# Patient Record
Sex: FEMALE | Race: WHITE | NOT HISPANIC OR LATINO | URBAN - METROPOLITAN AREA
[De-identification: names, ages, dates, MRNs, and addresses within clinical notes are randomized per-mention and may not be internally consistent; named-entity substitution may affect disease eponyms.]

---

## 2017-11-07 ENCOUNTER — EMERGENCY (EMERGENCY)
Facility: HOSPITAL | Age: 42
LOS: 0 days | Discharge: HOME | End: 2017-11-07
Admitting: INTERNAL MEDICINE

## 2017-11-07 DIAGNOSIS — Z98.51 TUBAL LIGATION STATUS: ICD-10-CM

## 2017-11-07 DIAGNOSIS — R51 HEADACHE: ICD-10-CM

## 2017-11-07 DIAGNOSIS — Z90.89 ACQUIRED ABSENCE OF OTHER ORGANS: ICD-10-CM

## 2018-01-26 ENCOUNTER — EMERGENCY (EMERGENCY)
Facility: HOSPITAL | Age: 43
LOS: 0 days | Discharge: HOME | End: 2018-01-26
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
## 2018-01-26 DIAGNOSIS — Z79.899 OTHER LONG TERM (CURRENT) DRUG THERAPY: ICD-10-CM

## 2018-01-26 DIAGNOSIS — Z98.890 OTHER SPECIFIED POSTPROCEDURAL STATES: ICD-10-CM
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
## 2018-01-26 DIAGNOSIS — L05.01 PILONIDAL CYST WITH ABSCESS: ICD-10-CM
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
## 2018-01-26 DIAGNOSIS — E03.9 HYPOTHYROIDISM, UNSPECIFIED: ICD-10-CM

## 2018-01-26 DIAGNOSIS — T80.89XA OTHER COMPLICATIONS FOLLOWING INFUSION, TRANSFUSION AND THERAPEUTIC INJECTION, INITIAL ENCOUNTER: ICD-10-CM

## 2018-01-26 DIAGNOSIS — Z98.51 TUBAL LIGATION STATUS: ICD-10-CM

## 2019-03-26 ENCOUNTER — APPOINTMENT (OUTPATIENT)
Dept: SURGERY | Facility: CLINIC | Age: 44
End: 2019-03-26
Payer: SELF-PAY

## 2019-03-26 VITALS
SYSTOLIC BLOOD PRESSURE: 128 MMHG | BODY MASS INDEX: 47.09 KG/M2 | HEIGHT: 66 IN | DIASTOLIC BLOOD PRESSURE: 82 MMHG | WEIGHT: 293 LBS

## 2019-03-26 PROCEDURE — SI006: CPT

## 2019-04-10 ENCOUNTER — APPOINTMENT (OUTPATIENT)
Dept: SURGERY | Facility: CLINIC | Age: 44
End: 2019-04-10
Payer: COMMERCIAL

## 2019-04-10 VITALS
SYSTOLIC BLOOD PRESSURE: 128 MMHG | BODY MASS INDEX: 47.09 KG/M2 | DIASTOLIC BLOOD PRESSURE: 76 MMHG | WEIGHT: 293 LBS | HEIGHT: 66 IN

## 2019-04-10 DIAGNOSIS — Z86.39 PERSONAL HISTORY OF OTHER ENDOCRINE, NUTRITIONAL AND METABOLIC DISEASE: ICD-10-CM

## 2019-04-10 DIAGNOSIS — Z87.09 PERSONAL HISTORY OF OTHER DISEASES OF THE RESPIRATORY SYSTEM: ICD-10-CM

## 2019-04-10 DIAGNOSIS — Z83.0 FAMILY HISTORY OF HUMAN IMMUNODEFICIENCY VIRUS [HIV] DISEASE: ICD-10-CM

## 2019-04-10 PROCEDURE — 99244 OFF/OP CNSLTJ NEW/EST MOD 40: CPT

## 2019-04-17 NOTE — REASON FOR VISIT
[Initial Consult] : an initial consult for [Morbid Obesity (BMI>40)] : morbid obesity (bmi>40) [Spouse] : spouse [FreeTextEntry2] : Patient presents for Bariatric consultation

## 2019-04-17 NOTE — REVIEW OF SYSTEMS
[Joint Stiffness] : joint stiffness [Joint Pain] : joint pain [Negative] : Allergic/Immunologic [FreeTextEntry9] : back

## 2019-04-17 NOTE — PLAN
[FreeTextEntry1] : PLAN:  Continue with preoperative evaluations.\par             Call with concerns.

## 2019-04-17 NOTE — HISTORY OF PRESENT ILLNESS
[de-identified] : Patient  presented today for Bariatric Surgery Consultation for consideration of Laparoscopic Gastric Bypass. The patient states that she has been overweight for several years and has tried multiple weight loss modalities in the past without any long-term sustainable weight loss. \par \par

## 2019-04-17 NOTE — CONSULT LETTER
[Please see my note below.] : Please see my note below. [Courtesy Letter:] : I had the pleasure of seeing your patient, [unfilled], in my office today. [Consult Closing:] : Thank you very much for allowing me to participate in the care of this patient.  If you have any questions, please do not hesitate to contact me. [Sincerely,] : Sincerely, [Dear  ___] : Dear  [unfilled], [FreeTextEntry3] : Kim Portillo MD, FACS, FASMBS\par Chief, General, Bariatric & Minimally Invasive Surgery\par [facility] \par [facility]\par

## 2019-04-17 NOTE — ASSESSMENT
[FreeTextEntry1] : MAHI KEBEDE is a 44 year female seen today for Bariatric consultation. She has access to a gym but is not engaging in any formal exercise.  She plans to use the treadmill at the gym 2 days week for 15 minutes and aim for a goal of 3-4 days week for 30 minutes.\par The surgical options for weight loss have been extensively discussed with the patient and questions answered. The patient was provided written and verbal education regarding the gastric bypass. The patient appears to have a reasonable understanding of the process and is ready to proceed.  Risks, including but not limited to:  anesthesia, death, bleeding, infection, leaks, blood clots, ulcers, malnutrition and need for additional operations have been reviewed.  The importance of a consistent diet and exercise regimen in regards to weight loss and maintenance has also been discussed and the patient agrees to actively participate in the process.  The importance of lifelong mineral and vitamin supplementation was also reviewed with the patient. The need to adhere to an appropriate diet and exercise regimen were emphasized; in particular the need to perform moderate to intense physical activity most days of the week.  No promises have been made in regards to any given outcome and possibility of inadequate weight loss as well as regain has been discussed with the patient.  The patient understands that a long-term commitment is necessary for long-term success.\par  \par

## 2019-04-17 NOTE — PHYSICAL EXAM
[Normal] : grossly intact [de-identified] : Mallampati III [de-identified] : Soft, nondistended

## 2019-04-24 ENCOUNTER — TRANSCRIPTION ENCOUNTER (OUTPATIENT)
Age: 44
End: 2019-04-24

## 2019-04-24 ENCOUNTER — FORM ENCOUNTER (OUTPATIENT)
Age: 44
End: 2019-04-24

## 2019-04-24 ENCOUNTER — OUTPATIENT (OUTPATIENT)
Dept: OUTPATIENT SERVICES | Facility: HOSPITAL | Age: 44
LOS: 1 days | Discharge: HOME | End: 2019-04-24
Payer: COMMERCIAL

## 2019-04-24 DIAGNOSIS — E06.9 THYROIDITIS, UNSPECIFIED: ICD-10-CM

## 2019-04-24 PROCEDURE — 76536 US EXAM OF HEAD AND NECK: CPT | Mod: 26

## 2019-04-25 ENCOUNTER — APPOINTMENT (OUTPATIENT)
Dept: ORTHOPEDIC SURGERY | Facility: CLINIC | Age: 44
End: 2019-04-25
Payer: COMMERCIAL

## 2019-04-25 ENCOUNTER — APPOINTMENT (OUTPATIENT)
Dept: THORACIC SURGERY | Facility: CLINIC | Age: 44
End: 2019-04-25
Payer: COMMERCIAL

## 2019-04-25 ENCOUNTER — OUTPATIENT (OUTPATIENT)
Dept: OUTPATIENT SERVICES | Facility: HOSPITAL | Age: 44
LOS: 1 days | End: 2019-04-25
Payer: COMMERCIAL

## 2019-04-25 ENCOUNTER — APPOINTMENT (OUTPATIENT)
Dept: CT IMAGING | Facility: HOSPITAL | Age: 44
End: 2019-04-25
Payer: COMMERCIAL

## 2019-04-25 VITALS
BODY MASS INDEX: 47.09 KG/M2 | OXYGEN SATURATION: 99 % | HEIGHT: 66 IN | DIASTOLIC BLOOD PRESSURE: 80 MMHG | HEART RATE: 76 BPM | WEIGHT: 293 LBS | SYSTOLIC BLOOD PRESSURE: 120 MMHG

## 2019-04-25 VITALS
OXYGEN SATURATION: 96 % | DIASTOLIC BLOOD PRESSURE: 98 MMHG | SYSTOLIC BLOOD PRESSURE: 191 MMHG | RESPIRATION RATE: 18 BRPM | BODY MASS INDEX: 44.3 KG/M2 | HEART RATE: 108 BPM | HEIGHT: 69 IN

## 2019-04-25 VITALS
OXYGEN SATURATION: 97 % | SYSTOLIC BLOOD PRESSURE: 191 MMHG | BODY MASS INDEX: 47.09 KG/M2 | HEIGHT: 66 IN | TEMPERATURE: 99.2 F | RESPIRATION RATE: 16 BRPM | WEIGHT: 293 LBS | DIASTOLIC BLOOD PRESSURE: 98 MMHG | HEART RATE: 108 BPM

## 2019-04-25 DIAGNOSIS — Z82.61 FAMILY HISTORY OF ARTHRITIS: ICD-10-CM

## 2019-04-25 DIAGNOSIS — Z87.39 PERSONAL HISTORY OF OTHER DISEASES OF THE MUSCULOSKELETAL SYSTEM AND CONNECTIVE TISSUE: ICD-10-CM

## 2019-04-25 DIAGNOSIS — Z87.898 PERSONAL HISTORY OF OTHER SPECIFIED CONDITIONS: ICD-10-CM

## 2019-04-25 PROCEDURE — 99204 OFFICE O/P NEW MOD 45 MIN: CPT

## 2019-04-25 PROCEDURE — 70490 CT SOFT TISSUE NECK W/O DYE: CPT | Mod: 26

## 2019-04-25 PROCEDURE — 71250 CT THORAX DX C-: CPT | Mod: 26

## 2019-04-25 PROCEDURE — 71250 CT THORAX DX C-: CPT

## 2019-04-25 PROCEDURE — 70490 CT SOFT TISSUE NECK W/O DYE: CPT

## 2019-04-25 NOTE — PHYSICAL EXAM
[General Appearance - Alert] : alert [Respiration, Rhythm And Depth] : normal respiratory rhythm and effort [] : no respiratory distress [Auscultation Breath Sounds / Voice Sounds] : lungs were clear to auscultation bilaterally [Exaggerated Use Of Accessory Muscles For Inspiration] : no accessory muscle use [Apical Impulse] : the apical impulse was normal [Heart Sounds] : normal S1 and S2 [Heart Rate And Rhythm] : heart rate was normal and rhythm regular [Examination Of The Chest] : the chest was normal in appearance [2+] : left 2+ [Skin Color & Pigmentation] : normal skin color and pigmentation [Abnormal Walk] : normal gait [Oriented To Time, Place, And Person] : oriented to person, place, and time

## 2019-04-26 NOTE — ASSESSMENT
[FreeTextEntry1] : 45 y/o F with incidental finding of a lesion at the T1-2 level on recent MRI. Reviewed MRI images with the patient, discussed that the lesion is well circumscribed and localized to an area at the T1-2 level. However, at this time I'm unsure if the lesion is located in the upper chest or lower neck, but upon my review it seems to be in the lower neck. \par \par Will need a CT neck and chest without contrast to further evaluate and will follow-up with patient with the results. Explained that if it is in the chest, we will likely proceed with removal and biopsy, if it is in the neck will refer her to Dr. Musa in ENT surgery. \par \par I have reviewed the patient's medical records and diagnostic images at the time of this office visit and have made the following recommendations\par Plan:\par 1. Obtain CT chest and neck without contrast to further evaluate lesion\par 2. Follow-up patient with results and plan of care

## 2019-04-26 NOTE — HISTORY OF PRESENT ILLNESS
[FreeTextEntry1] : 44yr old female, nonsmoker with a PMH of HLD, anxiety, arthritis, and hypothyroidism. She was experiencing back pain and evaluated by a Pain Management Specialist that recommended an MRI spine to further evaluate. She was evaluated by Dr. Maher and referred for possible surgical biopsy. \par \par MRI thoracic spine 04/22/19:\par -T1-2 right paraspinal lesion possibly representing a lipoma although a liposarcoma cannot be entirely excluded.\par -enlarged thyroid gland probably representing a goiter.\par -no evidence of thoracic disc herniation\par -minimal scoliosis \par \par \par

## 2019-04-26 NOTE — END OF VISIT
[FreeTextEntry3] : All medical record entries made by the Scribe were at my, Dr. Combs's direction and personally dictated by me on 04/25/2019 . I have reviewed the chart and agree that the record accurately reflects my personal performance of the history, physical exam, assessment and plan. I have also personally directed, reviewed, and agreed with the chart.

## 2019-04-29 ENCOUNTER — APPOINTMENT (OUTPATIENT)
Dept: OTOLARYNGOLOGY | Facility: CLINIC | Age: 44
End: 2019-04-29
Payer: COMMERCIAL

## 2019-04-29 ENCOUNTER — APPOINTMENT (OUTPATIENT)
Dept: NEUROSURGERY | Facility: CLINIC | Age: 44
End: 2019-04-29
Payer: COMMERCIAL

## 2019-04-29 VITALS
BODY MASS INDEX: 45.99 KG/M2 | SYSTOLIC BLOOD PRESSURE: 145 MMHG | WEIGHT: 293 LBS | RESPIRATION RATE: 16 BRPM | HEART RATE: 74 BPM | DIASTOLIC BLOOD PRESSURE: 82 MMHG | HEIGHT: 67 IN | TEMPERATURE: 97.6 F | OXYGEN SATURATION: 97 %

## 2019-04-29 DIAGNOSIS — R91.8 OTHER NONSPECIFIC ABNORMAL FINDING OF LUNG FIELD: ICD-10-CM

## 2019-04-29 DIAGNOSIS — D17.9 BENIGN LIPOMATOUS NEOPLASM, UNSPECIFIED: ICD-10-CM

## 2019-04-29 PROCEDURE — 99203 OFFICE O/P NEW LOW 30 MIN: CPT

## 2019-04-29 PROCEDURE — 99204 OFFICE O/P NEW MOD 45 MIN: CPT

## 2019-04-29 NOTE — HISTORY OF PRESENT ILLNESS
[de-identified] : 44 year old female hx of HLD, anxiety, arthritis, and hypothyroid, noted to have thoracic mass near brachial plexus and spinal column during work-up for gastric bypass. 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 Lesion paraspinal, on Right, near T1-2. No focal deficits.

## 2019-04-29 NOTE — ASSESSMENT
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 The differential diagnosis includes lipoma, nerve sheath tumor, schwannoma, or liposarcoma. She will need a multi-disciplinary approach for treatment and diagnosis of this mass. Diagnostic and therapeutic testing / management includes surgical resection with further planning dependent on pathology. She should see Dr. Badillo and CT surgery for formal scheduling with my team to join on the selected surgery date. I have explained all neurosurgical risks and benefits to the planned approach and the patient wishes to proceed at this time.

## 2019-04-30 ENCOUNTER — APPOINTMENT (OUTPATIENT)
Dept: HEMATOLOGY ONCOLOGY | Facility: CLINIC | Age: 44
End: 2019-04-30

## 2019-05-01 NOTE — PHYSICAL EXAM
[Normal] : no rashes [FreeTextEntry1] : wn, wd, obese, pleasant [de-identified] : no palpable mass, no cervical LAD, normal, pliable cervical skin [de-identified] : on UE weakness, normal, symmetric sensation

## 2019-05-01 NOTE — HISTORY OF PRESENT ILLNESS
[de-identified] : 43 yo F referred by Dr Combs for evaluation of right cervico-thoracic lipomatous lesion She had been evaluation for back and neck pain and this was an incidental finding on an MRI for suspected disc disease. She is a nonsmoker with a PMH of HLD, anxiety, arthritis, and hypothyroidism.  She has no symptoms of thoracic outlet syndrome such as weakness/parasthesia of UE, no shoulder pain, no neck pain, only back pain\par \par She was evaluated by Dr. Maher and referred for possible surgical biopsy. \par \par MRI thoracic spine 04/22/19:\par -T1-2 right paraspinal lesion possibly representing a lipoma although a liposarcoma cannot be entirely excluded.\par -enlarged thyroid gland probably representing a goiter.\par -no evidence of thoracic disc herniation\par -minimal scoliosis \par

## 2019-05-01 NOTE — ASSESSMENT
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

## 2019-05-02 PROBLEM — Z87.39 HISTORY OF ARTHRITIS: Status: RESOLVED | Noted: 2019-04-25 | Resolved: 2019-05-02

## 2019-05-02 PROBLEM — Z87.898 HISTORY OF MARIJUANA USE: Status: ACTIVE | Noted: 2019-04-25

## 2019-05-02 PROBLEM — Z82.61 FAMILY HISTORY OF ARTHRITIS: Status: ACTIVE | Noted: 2019-04-25

## 2019-05-02 NOTE — PHYSICAL EXAM
[General Appearance - Well Nourished] : well nourished [General Appearance - Well-Appearing] : Well appearing [Sclera] : the sclera and conjunctiva were normal [FreeTextEntry1] : Anxious [Neck Cervical Mass (___cm)] : no neck mass was observed [Heart Rate And Rhythm] : heart rate was normal and rhythm regular [Abdomen Soft] : Soft [] : No respiratory distress [Normal Station and Gait] : gait and station were normal [Full UE ROM unless otherwise noted:] : Full range of motion unless otherwise noted. [UE Motor Strength Normal unless otherwise noted:] : 5/5 strength in bilateral upper extremities unless otherwise noted. [Plantar Reflex Right Only] : absent on the right [Plantar Reflex Left Only] : absent on the left [Normal] : No swelling, erythema, ecchymosis or tenderness, no masses. [Tenderness] : no tenderness [Full ROM Unless otherwise noted:] : Full range of motion unless otherwise noted: [LE  Motor Strength Normal unless otherwise noted:] : 5/5 strength in bilateral lower extemities unless otherwise noted. [Masses] : no masses [2+] : left 2+

## 2019-05-02 NOTE — HISTORY OF PRESENT ILLNESS
[FreeTextEntry1] : This is a 44-year-old female who is no cardiac history who comes in complaining of many years of neck pain. She had an MRI which was reported as a paraspinal fatty mass thought to be lipoma with a cannot rule out liposarcoma read as well.Her pain is on and off especially when turning to the RIGHT side and when bending to the RIGHT side. She feels stiff. She has minimal radiation to either arm. She also didn't was told that the thyroid was margin this was checked and found to be normal. [Stable] : stable [___ yrs] : [unfilled] year(s) ago [2] : currently ~his/her~ pain is 2 out of 10 [Bending] : worsened by bending [Direct Pressure] : not exacerbated by direct pressure [Joint Movement] : worsened by joint movement [None] : No relieving factors are noted

## 2019-05-02 NOTE — DISCUSSION/SUMMARY
[All Questions Answered] : Patient (and family) had all questions answered to an agreeable level of satisfaction [Interested in Proceeding] : Patient (and family) expressed understanding and interest in proceeding with the plan as outlined [de-identified] : This lesion is in the anterior tissues of just in front and lateral to the paraspinal anterior longus muscles. This is not in the area of orthopaedics. The recommendation is to see the thoracic surgeon. I have spoken to the thoracic surgeon today and he will see her today. As this is at the top alone feels it is possibly the bottom of the neck related ENT and also up at the top of the chest cavity. She will see Dr. Combs right now. I also tried to reassure her that the imaging is very consistent with fat tumor less likely cancer.\par \par If imaging was ordered, the patient was told to make an appointment to review findings right after all imaging is completed.\par \par We discussed risks, benefits and alternatives. Rationale of care was reviewed and all questions were answered. Patient (and family) had all questions answered to her degree of the level of satisfaction. Patient (and family) expressed understanding and interest in proceeding with the plan as outlined.\par \par \par \par \par This note was done with a voice recognition transcription software and any typos are related to this rather than medical error.

## 2019-05-02 NOTE — DATA REVIEWED
[Imaging Present] : Present [de-identified] : MRI done 4/22/2019 shows T1-T2 RIGHT paraspinal lesion that was approximately 10 cm representing a lipoma although liposarcoma cannot be entirely excluded. There is a large thyroid gland. While it was reported as paraspinal this lesion is actually at the anterior paraspinal muscles and not in the muscles rather in the fat in the area of the origin of the brachial plexus and vessels. This is at the top of the lung fields medially.

## 2019-05-02 NOTE — REVIEW OF SYSTEMS
[Feeling Tired] : not feeling tired [Joint Pain] : joint pain [Joint Stiffness] : joint stiffness [Anxiety] : no anxiety [Nl] : Hematologic/Lymphatic

## 2019-05-07 ENCOUNTER — RESULT REVIEW (OUTPATIENT)
Age: 44
End: 2019-05-07

## 2019-05-16 ENCOUNTER — OTHER (OUTPATIENT)
Age: 44
End: 2019-05-16

## 2019-05-16 ENCOUNTER — APPOINTMENT (OUTPATIENT)
Dept: SURGERY | Facility: CLINIC | Age: 44
End: 2019-05-16
Payer: COMMERCIAL

## 2019-05-16 VITALS — WEIGHT: 293 LBS | BODY MASS INDEX: 47.09 KG/M2 | HEIGHT: 66 IN

## 2019-05-16 PROCEDURE — 97802 MEDICAL NUTRITION INDIV IN: CPT

## 2019-06-13 ENCOUNTER — APPOINTMENT (OUTPATIENT)
Dept: SURGERY | Facility: CLINIC | Age: 44
End: 2019-06-13

## 2019-06-18 ENCOUNTER — OTHER (OUTPATIENT)
Age: 44
End: 2019-06-18

## 2019-06-19 ENCOUNTER — RECORD ABSTRACTING (OUTPATIENT)
Age: 44
End: 2019-06-19

## 2019-06-19 DIAGNOSIS — N39.3 STRESS INCONTINENCE (FEMALE) (MALE): ICD-10-CM

## 2019-06-19 DIAGNOSIS — Z78.9 OTHER SPECIFIED HEALTH STATUS: ICD-10-CM

## 2019-06-19 LAB
CYTOLOGY CVX/VAG DOC THIN PREP: NORMAL
CYTOLOGY CVX/VAG DOC THIN PREP: NORMAL

## 2019-06-20 ENCOUNTER — FORM ENCOUNTER (OUTPATIENT)
Age: 44
End: 2019-06-20

## 2019-06-20 ENCOUNTER — APPOINTMENT (OUTPATIENT)
Dept: OBGYN | Facility: CLINIC | Age: 44
End: 2019-06-20
Payer: COMMERCIAL

## 2019-06-20 VITALS
BODY MASS INDEX: 45.99 KG/M2 | HEIGHT: 67 IN | SYSTOLIC BLOOD PRESSURE: 132 MMHG | DIASTOLIC BLOOD PRESSURE: 90 MMHG | WEIGHT: 293 LBS

## 2019-06-20 PROCEDURE — 99213 OFFICE O/P EST LOW 20 MIN: CPT

## 2019-06-21 ENCOUNTER — OUTPATIENT (OUTPATIENT)
Dept: OUTPATIENT SERVICES | Facility: HOSPITAL | Age: 44
LOS: 1 days | Discharge: HOME | End: 2019-06-21
Payer: COMMERCIAL

## 2019-06-21 DIAGNOSIS — N64.4 MASTODYNIA: ICD-10-CM

## 2019-06-21 PROCEDURE — G0279: CPT | Mod: 26

## 2019-06-21 PROCEDURE — 77066 DX MAMMO INCL CAD BI: CPT | Mod: 26

## 2019-06-21 PROCEDURE — 76642 ULTRASOUND BREAST LIMITED: CPT | Mod: 26,50

## 2019-07-08 ENCOUNTER — APPOINTMENT (OUTPATIENT)
Dept: NEUROSURGERY | Facility: CLINIC | Age: 44
End: 2019-07-08

## 2019-07-08 ENCOUNTER — OTHER (OUTPATIENT)
Age: 44
End: 2019-07-08

## 2019-07-09 ENCOUNTER — APPOINTMENT (OUTPATIENT)
Dept: OBGYN | Facility: CLINIC | Age: 44
End: 2019-07-09
Payer: COMMERCIAL

## 2019-07-09 VITALS — DIASTOLIC BLOOD PRESSURE: 90 MMHG | WEIGHT: 293 LBS | SYSTOLIC BLOOD PRESSURE: 136 MMHG | BODY MASS INDEX: 49.65 KG/M2

## 2019-07-09 PROCEDURE — 99214 OFFICE O/P EST MOD 30 MIN: CPT

## 2019-07-14 ENCOUNTER — EMERGENCY (EMERGENCY)
Facility: HOSPITAL | Age: 44
LOS: 0 days | Discharge: HOME | End: 2019-07-14
Attending: EMERGENCY MEDICINE | Admitting: EMERGENCY MEDICINE
Payer: COMMERCIAL

## 2019-07-14 VITALS
TEMPERATURE: 98 F | OXYGEN SATURATION: 98 % | RESPIRATION RATE: 20 BRPM | SYSTOLIC BLOOD PRESSURE: 139 MMHG | DIASTOLIC BLOOD PRESSURE: 64 MMHG | HEART RATE: 111 BPM

## 2019-07-14 VITALS — HEART RATE: 98 BPM

## 2019-07-14 DIAGNOSIS — M25.552 PAIN IN LEFT HIP: ICD-10-CM

## 2019-07-14 DIAGNOSIS — Z98.51 TUBAL LIGATION STATUS: Chronic | ICD-10-CM

## 2019-07-14 DIAGNOSIS — F41.9 ANXIETY DISORDER, UNSPECIFIED: ICD-10-CM

## 2019-07-14 DIAGNOSIS — R10.9 UNSPECIFIED ABDOMINAL PAIN: ICD-10-CM

## 2019-07-14 PROCEDURE — 99283 EMERGENCY DEPT VISIT LOW MDM: CPT

## 2019-07-14 PROCEDURE — 72170 X-RAY EXAM OF PELVIS: CPT | Mod: 26

## 2019-07-14 NOTE — ED ADULT TRIAGE NOTE - CHIEF COMPLAINT QUOTE
I started having pain last night on my side , when I touch it, it hurts, but now its spreading (LLQ) - patient

## 2019-07-14 NOTE — ED ADULT NURSE NOTE - NSIMPLEMENTINTERV_GEN_ALL_ED
Implemented All Universal Safety Interventions:  [place] to call system. Call bell, personal items and telephone within reach. Instruct patient to call for assistance. Room bathroom lighting operational. Non-slip footwear when patient is off stretcher. Physically safe environment: no spills, clutter or unnecessary equipment. Stretcher in lowest position, wheels locked, appropriate side rails in place.

## 2019-07-14 NOTE — ED PROVIDER NOTE - CLINICAL SUMMARY MEDICAL DECISION MAKING FREE TEXT BOX
45 y/o female recently diagnosed with right upper thoracic cavity mass seeing  multidisciplinary team at other institution, severe anxiety, presented to ED with pain over left iliac crest. Exam was normal with minimal tenderness, no rash. No GI complaints with normal exam. Pt admits she is just very anxious due to recent diagnosis of mass. Imaging obtained and was negative. Pt requested contact for therapy to cope with new diagnosis and those were provided with the help of social work. Pt d/c'ed with outpt f/up and strict return precautions.

## 2019-07-14 NOTE — ED PROVIDER NOTE - OBJECTIVE STATEMENT
44 yold female to ED Pmhx LBP, anxiety, HLD, s/p BTL 2012, s/p tonsillectomy; pt presents with pain to Left illiac crest area x 1 day; pt extremely anxious due to concern for cancer; pt found to have lipoma like lesion on bracheoplexsis on Mri 3 months ago on testing prior to gastric bypass surgery; pt had Mri of lower back repeated 2-3 days ago to evaluate lipoma and is concerned that this pain due to malignancy; pt denies n/v/flank pain, abdominal pain; pt denies dysuria, frequency, urgency, burning; denies prior hx of kidney stones or fmhx kidney stones;

## 2019-07-14 NOTE — ED PROVIDER NOTE - PHYSICAL EXAMINATION
Constitutional: Well developed, well nourished. NAD  Head: Normocephalic, atraumatic.  Eyes: PERRL, EOMI.  ENT: No nasal discharge. Mucous membranes dry.  Neck: Supple. Painless ROM.  Cardiovascular: Regular rate and rhythm.   Pulmonary:  Lungs clear to auscultation bilaterally.   Abdominal: Soft. Nondistended. No rebound, guarding, rigidity. + bony tenderness noted to left illiac crest; no cva tenderness;   Extremities. Pelvis stable. No lower extremity edema, symmetric calves.  Skin: No rashes, cyanosis.  Neuro: AAOx3. No focal neurological deficits.  Psych: Normal mood. Normal affect.

## 2019-07-14 NOTE — ED PROVIDER NOTE - ATTENDING CONTRIBUTION TO CARE
I personally evaluated the patient. I reviewed the Resident’s or Physician Assistant’s note (as assigned above), and agree with the findings and plan except as documented in my note.    Pt is a 45 y/o female with hx of anxiety, recently diagnosed with upper thoracic mass, seeing specialists at other facility presents to ED for pain over left hip that patient noticed today, has gotten worse since onset with, mild, worse with palpation. Pt denies trauma. No rash. No abd pain, n/v/d, urinary complaints. Pt states she is very anxious and whenever has any symptoms starts worrying it is cancer. Pt states she thinks her hip is hurting more because she has been pressing it all day.     Constitutional: Well developed, well nourished. NAD.  Head: Normocephalic, atraumatic.  Eyes: PERRL. EOMI.  ENT: No nasal discharge. Mucous membranes moist.  Neck: Supple. Painless ROM.  Cardiovascular: Normal S1, S2. Regular rate and rhythm. No murmurs, rubs, or gallops.  Pulmonary: Normal respiratory rate and effort. Lungs clear to auscultation bilaterally. No wheezing, rales, or rhonchi.  Abdominal: Soft. Nondistended. Nontender. No rebound, guarding, rigidity.  Extremities. Pelvis stable. Mild tenderness over left lateral iliac crest, no rash. No lower extremity edema, symmetric calves.  Skin: No rashes, cyanosis.  Neuro: AAOx3. No focal neurological deficits.  Psych: Normal mood. Normal affect.

## 2019-07-14 NOTE — ED ADULT NURSE NOTE - CHPI ED NUR SYMPTOMS NEG
no blood in stool/no burning urination/no chills/no abdominal distension/no dysuria/no diarrhea/no fever/no hematuria/no vomiting/no nausea

## 2019-07-14 NOTE — ED ADULT NURSE NOTE - OBJECTIVE STATEMENT
Patient is a 44 year old female c/o left pelvic pain x 1 day, pt is anxious due to concern for cancer; patient had MRI done for gastric bypass surgery 3 months ago and a lipoma like lesion was found on bracheoplexis. Patient is concerned that this pain due to malignancy. Patient denies n/v/flank pain, abdominal pain, dysuria, frequency, urgency and burning.

## 2019-07-22 ENCOUNTER — APPOINTMENT (OUTPATIENT)
Dept: NEUROSURGERY | Facility: CLINIC | Age: 44
End: 2019-07-22
Payer: COMMERCIAL

## 2019-07-22 VITALS
DIASTOLIC BLOOD PRESSURE: 85 MMHG | HEIGHT: 67 IN | OXYGEN SATURATION: 98 % | HEART RATE: 93 BPM | WEIGHT: 293 LBS | BODY MASS INDEX: 45.99 KG/M2 | SYSTOLIC BLOOD PRESSURE: 136 MMHG | RESPIRATION RATE: 16 BRPM

## 2019-07-22 PROBLEM — F41.9 ANXIETY DISORDER, UNSPECIFIED: Chronic | Status: ACTIVE | Noted: 2019-07-14

## 2019-07-22 PROBLEM — M54.5 LOW BACK PAIN: Chronic | Status: ACTIVE | Noted: 2019-07-14

## 2019-07-22 PROBLEM — E78.5 HYPERLIPIDEMIA, UNSPECIFIED: Chronic | Status: ACTIVE | Noted: 2019-07-14

## 2019-07-22 PROCEDURE — 99214 OFFICE O/P EST MOD 30 MIN: CPT

## 2019-07-22 NOTE — HISTORY OF PRESENT ILLNESS
[FreeTextEntry1] : 44 year old female hx of HLD, anxiety, arthritis, and hypothyroid, noted to have thoracic mass near brachial plexus and spinal column during work-up for gastric bypass. 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 Lesion paraspinal, on Right, near T1-2. No focal deficits. \par  \par She presents today with an MRI to review and discuss treatment options.

## 2019-07-22 NOTE — PHYSICAL EXAM
[General Appearance - Alert] : alert [General Appearance - In No Acute Distress] : in no acute distress [Oriented To Time, Place, And Person] : oriented to person, place, and time [Person] : oriented to person [Place] : oriented to place [Cranial Nerves Optic (II)] : visual acuity intact bilaterally,  pupils equal round and reactive to light [Time] : oriented to time [Cranial Nerves Oculomotor (III)] : extraocular motion intact [Cranial Nerves Trigeminal (V)] : facial sensation intact symmetrically [Cranial Nerves Facial (VII)] : face symmetrical [Cranial Nerves Glossopharyngeal (IX)] : tongue and palate midline [Cranial Nerves Vestibulocochlear (VIII)] : hearing was intact bilaterally [Cranial Nerves Accessory (XI - Cranial And Spinal)] : head turning and shoulder shrug symmetric [Cranial Nerves Hypoglossal (XII)] : there was no tongue deviation with protrusion [Motor Tone] : muscle tone was normal in all four extremities [Sensation Tactile Decrease] : light touch was intact [Balance] : balance was intact [Intact] : all sensory within normal limits bilaterally [Abnormal Walk] : normal gait [FreeTextEntry1] : anxious, tearful

## 2019-07-22 NOTE — DATA REVIEWED
[de-identified] : I have reviewed the most recent MRI which shows no change to the mass at her cervico-thoracic junction\par

## 2019-07-22 NOTE — REASON FOR VISIT
[FreeTextEntry1] : [facility] multidisciplinary tumor board: 5/6/19\par Rad: consistent with lipoma, not invading neural foramen, could have been read as liposarcoma due to thickened inferior margin but strongly favor lipoma\par \par Plan: repeat MRI soft tissue neck with and without contrast in 3 months\par Plan of care discussed with patient who understands and agrees\par

## 2019-07-23 ENCOUNTER — OUTPATIENT (OUTPATIENT)
Dept: OUTPATIENT SERVICES | Facility: HOSPITAL | Age: 44
LOS: 1 days | Discharge: HOME | End: 2019-07-23
Payer: COMMERCIAL

## 2019-07-23 DIAGNOSIS — R10.2 PELVIC AND PERINEAL PAIN: ICD-10-CM

## 2019-07-23 DIAGNOSIS — Z98.51 TUBAL LIGATION STATUS: Chronic | ICD-10-CM

## 2019-07-23 PROCEDURE — 76856 US EXAM PELVIC COMPLETE: CPT | Mod: 26

## 2019-07-30 ENCOUNTER — APPOINTMENT (OUTPATIENT)
Dept: SURGERY | Facility: CLINIC | Age: 44
End: 2019-07-30
Payer: COMMERCIAL

## 2019-07-30 VITALS — HEIGHT: 67 IN | WEIGHT: 293 LBS | BODY MASS INDEX: 45.99 KG/M2

## 2019-07-30 PROCEDURE — 99212 OFFICE O/P EST SF 10 MIN: CPT

## 2019-07-30 RX ORDER — PREDNISONE 10 MG
TABLET ORAL
Refills: 0 | Status: DISCONTINUED | COMMUNITY
End: 2019-07-30

## 2019-07-30 RX ORDER — ALBUTEROL 90 MCG
AEROSOL (GRAM) INHALATION
Refills: 0 | Status: DISCONTINUED | COMMUNITY
End: 2019-07-30

## 2019-07-30 RX ORDER — CLONAZEPAM 0.5 MG/1
0.5 TABLET ORAL DAILY
Refills: 0 | Status: ACTIVE | COMMUNITY
Start: 2019-07-30

## 2019-07-31 ENCOUNTER — APPOINTMENT (OUTPATIENT)
Dept: BREAST CENTER | Facility: CLINIC | Age: 44
End: 2019-07-31
Payer: COMMERCIAL

## 2019-07-31 VITALS
DIASTOLIC BLOOD PRESSURE: 78 MMHG | BODY MASS INDEX: 45.99 KG/M2 | SYSTOLIC BLOOD PRESSURE: 128 MMHG | WEIGHT: 293 LBS | TEMPERATURE: 98.8 F | HEIGHT: 67 IN

## 2019-07-31 DIAGNOSIS — Z78.9 OTHER SPECIFIED HEALTH STATUS: ICD-10-CM

## 2019-07-31 DIAGNOSIS — R92.2 INCONCLUSIVE MAMMOGRAM: ICD-10-CM

## 2019-07-31 PROCEDURE — 99213 OFFICE O/P EST LOW 20 MIN: CPT

## 2019-07-31 PROCEDURE — 99203 OFFICE O/P NEW LOW 30 MIN: CPT

## 2019-08-05 ENCOUNTER — OTHER (OUTPATIENT)
Age: 44
End: 2019-08-05

## 2019-08-06 ENCOUNTER — OUTPATIENT (OUTPATIENT)
Dept: OUTPATIENT SERVICES | Facility: HOSPITAL | Age: 44
LOS: 1 days | Discharge: HOME | End: 2019-08-06
Payer: COMMERCIAL

## 2019-08-06 VITALS
HEIGHT: 67 IN | TEMPERATURE: 98 F | RESPIRATION RATE: 15 BRPM | OXYGEN SATURATION: 98 % | HEART RATE: 78 BPM | WEIGHT: 293 LBS | DIASTOLIC BLOOD PRESSURE: 74 MMHG | SYSTOLIC BLOOD PRESSURE: 134 MMHG

## 2019-08-06 DIAGNOSIS — Z98.51 TUBAL LIGATION STATUS: Chronic | ICD-10-CM

## 2019-08-06 DIAGNOSIS — N84.0 POLYP OF CORPUS UTERI: ICD-10-CM

## 2019-08-06 DIAGNOSIS — Z01.818 ENCOUNTER FOR OTHER PREPROCEDURAL EXAMINATION: ICD-10-CM

## 2019-08-06 LAB
ALBUMIN SERPL ELPH-MCNC: 4.2 G/DL — SIGNIFICANT CHANGE UP (ref 3.5–5.2)
ALP SERPL-CCNC: 49 U/L — SIGNIFICANT CHANGE UP (ref 30–115)
ALT FLD-CCNC: 14 U/L — SIGNIFICANT CHANGE UP (ref 0–41)
ANION GAP SERPL CALC-SCNC: 10 MMOL/L — SIGNIFICANT CHANGE UP (ref 7–14)
APPEARANCE UR: ABNORMAL
APTT BLD: 32.9 SEC — SIGNIFICANT CHANGE UP (ref 27–39.2)
AST SERPL-CCNC: 13 U/L — SIGNIFICANT CHANGE UP (ref 0–41)
BACTERIA # UR AUTO: ABNORMAL
BASOPHILS # BLD AUTO: 0.03 K/UL — SIGNIFICANT CHANGE UP (ref 0–0.2)
BASOPHILS NFR BLD AUTO: 0.6 % — SIGNIFICANT CHANGE UP (ref 0–1)
BILIRUB SERPL-MCNC: 0.4 MG/DL — SIGNIFICANT CHANGE UP (ref 0.2–1.2)
BILIRUB UR-MCNC: NEGATIVE — SIGNIFICANT CHANGE UP
BUN SERPL-MCNC: 17 MG/DL — SIGNIFICANT CHANGE UP (ref 10–20)
CALCIUM SERPL-MCNC: 9.6 MG/DL — SIGNIFICANT CHANGE UP (ref 8.5–10.1)
CHLORIDE SERPL-SCNC: 102 MMOL/L — SIGNIFICANT CHANGE UP (ref 98–110)
CO2 SERPL-SCNC: 28 MMOL/L — SIGNIFICANT CHANGE UP (ref 17–32)
COLOR SPEC: YELLOW — SIGNIFICANT CHANGE UP
CREAT SERPL-MCNC: 0.6 MG/DL — LOW (ref 0.7–1.5)
DIFF PNL FLD: NEGATIVE — SIGNIFICANT CHANGE UP
EOSINOPHIL # BLD AUTO: 0.19 K/UL — SIGNIFICANT CHANGE UP (ref 0–0.7)
EOSINOPHIL NFR BLD AUTO: 3.5 % — SIGNIFICANT CHANGE UP (ref 0–8)
EPI CELLS # UR: 21 /HPF — HIGH (ref 0–5)
GLUCOSE SERPL-MCNC: 93 MG/DL — SIGNIFICANT CHANGE UP (ref 70–99)
GLUCOSE UR QL: NEGATIVE — SIGNIFICANT CHANGE UP
HCT VFR BLD CALC: 41.3 % — SIGNIFICANT CHANGE UP (ref 37–47)
HGB BLD-MCNC: 12.4 G/DL — SIGNIFICANT CHANGE UP (ref 12–16)
HYALINE CASTS # UR AUTO: 6 /LPF — SIGNIFICANT CHANGE UP (ref 0–7)
IMM GRANULOCYTES NFR BLD AUTO: 0.6 % — HIGH (ref 0.1–0.3)
INR BLD: 1.03 RATIO — SIGNIFICANT CHANGE UP (ref 0.65–1.3)
KETONES UR-MCNC: NEGATIVE — SIGNIFICANT CHANGE UP
LEUKOCYTE ESTERASE UR-ACNC: ABNORMAL
LYMPHOCYTES # BLD AUTO: 1.19 K/UL — LOW (ref 1.2–3.4)
LYMPHOCYTES # BLD AUTO: 21.8 % — SIGNIFICANT CHANGE UP (ref 20.5–51.1)
MCHC RBC-ENTMCNC: 18.9 PG — LOW (ref 27–31)
MCHC RBC-ENTMCNC: 30 G/DL — LOW (ref 32–37)
MCV RBC AUTO: 63 FL — LOW (ref 81–99)
MONOCYTES # BLD AUTO: 0.35 K/UL — SIGNIFICANT CHANGE UP (ref 0.1–0.6)
MONOCYTES NFR BLD AUTO: 6.4 % — SIGNIFICANT CHANGE UP (ref 1.7–9.3)
NEUTROPHILS # BLD AUTO: 3.66 K/UL — SIGNIFICANT CHANGE UP (ref 1.4–6.5)
NEUTROPHILS NFR BLD AUTO: 67.1 % — SIGNIFICANT CHANGE UP (ref 42.2–75.2)
NITRITE UR-MCNC: NEGATIVE — SIGNIFICANT CHANGE UP
NRBC # BLD: 0 /100 WBCS — SIGNIFICANT CHANGE UP (ref 0–0)
PH UR: 6 — SIGNIFICANT CHANGE UP (ref 5–8)
PLATELET # BLD AUTO: 330 K/UL — SIGNIFICANT CHANGE UP (ref 130–400)
POTASSIUM SERPL-MCNC: 4.4 MMOL/L — SIGNIFICANT CHANGE UP (ref 3.5–5)
POTASSIUM SERPL-SCNC: 4.4 MMOL/L — SIGNIFICANT CHANGE UP (ref 3.5–5)
PROT SERPL-MCNC: 6.7 G/DL — SIGNIFICANT CHANGE UP (ref 6–8)
PROT UR-MCNC: ABNORMAL
PROTHROM AB SERPL-ACNC: 11.8 SEC — SIGNIFICANT CHANGE UP (ref 9.95–12.87)
RBC # BLD: 6.56 M/UL — HIGH (ref 4.2–5.4)
RBC # FLD: 18.3 % — HIGH (ref 11.5–14.5)
RBC CASTS # UR COMP ASSIST: 1 /HPF — SIGNIFICANT CHANGE UP (ref 0–4)
SODIUM SERPL-SCNC: 140 MMOL/L — SIGNIFICANT CHANGE UP (ref 135–146)
SP GR SPEC: 1.03 — HIGH (ref 1.01–1.02)
UROBILINOGEN FLD QL: SIGNIFICANT CHANGE UP
WBC # BLD: 5.45 K/UL — SIGNIFICANT CHANGE UP (ref 4.8–10.8)
WBC # FLD AUTO: 5.45 K/UL — SIGNIFICANT CHANGE UP (ref 4.8–10.8)
WBC UR QL: 16 /HPF — HIGH (ref 0–5)

## 2019-08-06 PROCEDURE — 93010 ELECTROCARDIOGRAM REPORT: CPT

## 2019-08-06 NOTE — H&P PST ADULT - RS GEN PE MLT RESP DETAILS PC
airway patent/respirations non-labored/clear to auscultation bilaterally/normal/no chest wall tenderness/no intercostal retractions/breath sounds equal/good air movement

## 2019-08-06 NOTE — H&P PST ADULT - EXTREMITIES COMMENTS
RIGHT  ABOVE KNEE- LYMPHOMA  REMOVED - AREA COVERED BY 4 X4- NO DRAINAGE NOTED- EDEMATOUS- MILD BRUISING NOTED.

## 2019-08-06 NOTE — H&P PST ADULT - REASON FOR ADMISSION
PT PRESENTS TO PAST WITH NO SOB, CP, PALPITATIONS, DYSURIA, UTI OR URI AT PRESENT.   PT ABLE TO WALK UP -1   FLIGHTS OF STEPS WITH NO SOB.  AS PER THE PT, THIS IS HIS/HER COMPLETE MEDICAL AND SURGICAL HX, INCLUDING MEDICATIONS PRESCRIBED AND OVER THE COUNTER  PT PRESENTS TO PAST WITH H/O-UTERINE POLYP.

## 2019-08-06 NOTE — H&P PST ADULT - NSANTHOSAYNRD_GEN_A_CORE
No. GRECIA screening performed.  STOP BANG Legend: 0-2 = LOW Risk; 3-4 = INTERMEDIATE Risk; 5-8 = HIGH Risk

## 2019-08-06 NOTE — H&P PST ADULT - NSICDXPASTMEDICALHX_GEN_ALL_CORE_FT
PAST MEDICAL HISTORY:  Anxiety     H/O gastric ulcer     HLD (hyperlipidemia)     HTN (hypertension)     Hypothyroidism     Lower back pain     OA (osteoarthritis) PAST MEDICAL HISTORY:  Anxiety     H/O gastric ulcer     HLD (hyperlipidemia)     HTN (hypertension)     Hypothyroidism     Lower back pain     OA (osteoarthritis)     Obesity bmi  47.6

## 2019-08-11 ENCOUNTER — FORM ENCOUNTER (OUTPATIENT)
Age: 44
End: 2019-08-11

## 2019-08-12 ENCOUNTER — OUTPATIENT (OUTPATIENT)
Dept: OUTPATIENT SERVICES | Facility: HOSPITAL | Age: 44
LOS: 1 days | Discharge: HOME | End: 2019-08-12
Payer: COMMERCIAL

## 2019-08-12 DIAGNOSIS — R10.9 UNSPECIFIED ABDOMINAL PAIN: ICD-10-CM

## 2019-08-12 DIAGNOSIS — R10.2 PELVIC AND PERINEAL PAIN: ICD-10-CM

## 2019-08-12 DIAGNOSIS — Z98.51 TUBAL LIGATION STATUS: Chronic | ICD-10-CM

## 2019-08-12 PROCEDURE — 76700 US EXAM ABDOM COMPLETE: CPT | Mod: 26

## 2019-08-12 PROCEDURE — 76856 US EXAM PELVIC COMPLETE: CPT | Mod: 26

## 2019-08-20 ENCOUNTER — APPOINTMENT (OUTPATIENT)
Dept: SURGERY | Facility: CLINIC | Age: 44
End: 2019-08-20
Payer: COMMERCIAL

## 2019-08-20 VITALS — WEIGHT: 293 LBS | HEIGHT: 67 IN | BODY MASS INDEX: 45.99 KG/M2

## 2019-08-20 PROBLEM — E66.9 OBESITY, UNSPECIFIED: Chronic | Status: ACTIVE | Noted: 2019-08-06

## 2019-08-20 PROBLEM — M19.90 UNSPECIFIED OSTEOARTHRITIS, UNSPECIFIED SITE: Chronic | Status: ACTIVE | Noted: 2019-08-06

## 2019-08-20 PROBLEM — E03.9 HYPOTHYROIDISM, UNSPECIFIED: Chronic | Status: ACTIVE | Noted: 2019-08-06

## 2019-08-20 PROBLEM — Z87.19 PERSONAL HISTORY OF OTHER DISEASES OF THE DIGESTIVE SYSTEM: Chronic | Status: ACTIVE | Noted: 2019-08-06

## 2019-08-20 PROCEDURE — 99212 OFFICE O/P EST SF 10 MIN: CPT

## 2019-08-21 RX ORDER — ESCITALOPRAM OXALATE 20 MG/1
20 TABLET, FILM COATED ORAL DAILY
Refills: 0 | Status: ACTIVE | COMMUNITY
Start: 2019-08-20

## 2019-08-22 NOTE — ASU PATIENT PROFILE, ADULT - PMH
Anxiety    H/O gastric ulcer    HLD (hyperlipidemia)    HTN (hypertension)    Hypothyroidism    Lower back pain    OA (osteoarthritis)    Obesity  bmi  47.6

## 2019-08-23 ENCOUNTER — RESULT REVIEW (OUTPATIENT)
Age: 44
End: 2019-08-23

## 2019-08-23 ENCOUNTER — OUTPATIENT (OUTPATIENT)
Dept: OUTPATIENT SERVICES | Facility: HOSPITAL | Age: 44
LOS: 1 days | Discharge: HOME | End: 2019-08-23
Payer: COMMERCIAL

## 2019-08-23 VITALS
RESPIRATION RATE: 20 BRPM | HEART RATE: 69 BPM | OXYGEN SATURATION: 98 % | DIASTOLIC BLOOD PRESSURE: 58 MMHG | SYSTOLIC BLOOD PRESSURE: 110 MMHG

## 2019-08-23 VITALS
OXYGEN SATURATION: 97 % | WEIGHT: 293 LBS | TEMPERATURE: 98 F | DIASTOLIC BLOOD PRESSURE: 58 MMHG | HEART RATE: 77 BPM | RESPIRATION RATE: 16 BRPM | HEIGHT: 67 IN | SYSTOLIC BLOOD PRESSURE: 125 MMHG

## 2019-08-23 DIAGNOSIS — Z98.51 TUBAL LIGATION STATUS: Chronic | ICD-10-CM

## 2019-08-23 DIAGNOSIS — Z90.89 ACQUIRED ABSENCE OF OTHER ORGANS: Chronic | ICD-10-CM

## 2019-08-23 PROCEDURE — 88305 TISSUE EXAM BY PATHOLOGIST: CPT | Mod: 26

## 2019-08-23 PROCEDURE — 58558 HYSTEROSCOPY BIOPSY: CPT

## 2019-08-23 RX ORDER — OXYCODONE AND ACETAMINOPHEN 5; 325 MG/1; MG/1
1 TABLET ORAL ONCE
Refills: 0 | Status: DISCONTINUED | OUTPATIENT
Start: 2019-08-23 | End: 2019-08-23

## 2019-08-23 RX ORDER — SODIUM CHLORIDE 9 MG/ML
1000 INJECTION, SOLUTION INTRAVENOUS
Refills: 0 | Status: DISCONTINUED | OUTPATIENT
Start: 2019-08-23 | End: 2019-09-11

## 2019-08-23 RX ORDER — ONDANSETRON 8 MG/1
4 TABLET, FILM COATED ORAL ONCE
Refills: 0 | Status: COMPLETED | OUTPATIENT
Start: 2019-08-23 | End: 2019-08-23

## 2019-08-23 RX ORDER — HYDROMORPHONE HYDROCHLORIDE 2 MG/ML
0.5 INJECTION INTRAMUSCULAR; INTRAVENOUS; SUBCUTANEOUS
Refills: 0 | Status: DISCONTINUED | OUTPATIENT
Start: 2019-08-23 | End: 2019-08-23

## 2019-08-23 RX ADMIN — SODIUM CHLORIDE 100 MILLILITER(S): 9 INJECTION, SOLUTION INTRAVENOUS at 08:30

## 2019-08-23 RX ADMIN — ONDANSETRON 4 MILLIGRAM(S): 8 TABLET, FILM COATED ORAL at 09:14

## 2019-08-23 NOTE — ASU PREOP CHECKLIST - BSA (M2)
Reason For Visit    KAMALA EVANGELISTA presents for an INR check and patient teaching.   Patient accompanied by: wife Hollis.         History of Present Illness    Symptoms:.   The patient is currently asymptomatic..   Additional Screening:.   No. Missed dose(s). .   No: Extra dose(s).   No: Significant medication changes since last visit .   No: Vitamin K dietary changes. .   Goal Vitamin K intake: 2. . cup(s)/week. .   No: S/Sx(s) of bleeding or bruising.. .   No: ETOH Intake. .   No: Falls/Injuries. . Uses rolling walker. .   No: Acute Illness. .   No: Procedure/Hospital/ER Visit..      Current Meds   1. DilTIAZem HCl ER Coated Beads 120 MG Oral Capsule Extended Release 24 Hour   (Cardizem CD); TAKE ONE CAPSULE BY MOUTH TWICE DAILY;   Therapy: 34Pvt9814 to (Evaluate:32Xng3076)  Requested for: 74Cjp2572; Last   Rx:[id number] Ordered   2. Finasteride 5 MG Oral Tablet; TAKE 1 TABLET ONE TIME DAILY;   Therapy: 94Qvz6005 to (Evaluate:56Rzj6515)  Requested for: 74Opq7426; Last   Rx:[id number] Ordered   3. Fluocinonide 0.05 % External Cream; APPLY TWICE A DAY AS     NEEDED;   Therapy: 39Plg6633 to (Evaluate:30Esr4202)  Requested for: 10Jun2014; Last   Rx:[id number] Ordered   4. Furosemide 40 MG Oral Tablet; TAKE 1 TABLET DAILY;   Therapy: 56Swx7898 to (Evaluate:62Ckr2167)  Requested for: 86Xhr2776; Last   Rx:[id number] Ordered   5. Losartan Potassium 25 MG Oral Tablet; TAKE 1 TABLET EVERY DAY;   Therapy: 38Eke8126 to (Evaluate:48Woo1304)  Requested for: 24Mzd3246; Last   Rx:04Gxj0966 Ordered   6. Metoprolol Tartrate 25 MG Oral Tablet; TAKE 1 TABLET TWICE DAILY;   Therapy: 30Sgi5254 to (Evaluate:16Ykx4004)  Requested for: 43Erx9640; Last   Rx:[id number] Ordered   7. Oxybutynin Chloride 5 MG Oral Tablet; TAKE 1 TABLET TWICE DAILY as needed;   Therapy: 17Bet4256 to (Evaluate:84Bdt2706)  Requested for: 38Pjv2713; Last   Rx:[id number] Ordered   8. Tamsulosin HCl - 0.4 MG Oral Capsule; TAKE 1 CAPSULE EVERY NIGHT AT BEDTIME;   Therapy:  09Fmi0803 to (Evaluate:01Yxe7379)  Requested for: 31Kvk9458; Last   Rx:[id number] Ordered   9. Timolol Maleate 0.5 % Ophthalmic Solution;   Therapy: 26Nov2012 to Recorded   10. Triamcinolone Acetonide 0.1 % External Ointment; apply daily to rash;    Therapy: 14Jan2015 to (Evaluate:10Wbd3782)  Requested for: 94Wqq8220; Last    Rx:32Yoy1025 Ordered   11. Warfarin Sodium 5 MG Oral Tablet (Coumadin); TAKE 1 & 1/2 TABLETS DAILY ON    MONDAY, WEDNESDAY AND FRIDAY  THEN TAKE1 TABLET ON SUNDAY, TUESDAY,    THURSDAY AND SATURDAY;    Therapy: 78Hmn7895 to (Evaluate:02Wro2217)  Requested for: 20Oct2017; Last    Rx:[id number] Ordered    Results/Data  Coumadin New    ** Printed in Appendix #1 below.     Assessment   1. Atrial fibrillation (I48.91)   2. Long term current use of anticoagulant therapy (Z79.01)    Indication: Afib CHADS2=2 Age, HTN..   Goal INR Range: 2.0-3.0..   Estimated Duration: long term..   INR is subtherapeutic today. INR 1.9.      Orders      Additional Dosing Instructions: 35 mg/wk..   Follow-up: 1- @ 10:20 a.    Provided patient with verbal and written dosing instructions. Pt verbalized understanding..      Counseling    Educated on appropriate management of nosebleeds and prevention:   Cautioned patient regarding falls and appropriate action to take if serious injury occurs (especially head trauma):   Strongly advised to limit or avoid alcohol consumption; also discussed risks of INR elevation/bleeding with excessive alcohol consumption:   Instructed patient to notify clinic if any medication changes and/or health status changes occur prior to next appointment:   Instructed to notify clinic if any future procedures are scheduled, especially if anticoagulation treatment must be withheld:   Discussed the risk of thrombotic and bleeding complications with inappropriate use of anticoagulant:   Patient agrees to all of the above       Discussion/Summary    Provider Comments: Encourage patient to call with  any questions or concerns...      Signatures   Electronically signed by : Paula Hernandez R.N.; Dec 18 2017  5:22PM CST    Appendix #1     Coumadin New   Patient: KAMALA EVANGELISTA; : 1938; MRN: [id number]      70Rvc1652 71Wqe1321 87Jir6525 28Qar4579 88Hfn9466 86Qzm8931 81Uzv6809   PROTHROMBIN TIME      35.7 sec    INR      3.2     INR, FINGERSTICK 1.9  3.3  3.2  2.4  2.4      CURRENT DOSE 35 mg/wk  37.5 mg/wk  37.5 mg/wk  37.5 mg/wk  37.5 mg/wk      COMMENT   Hold 1 dose     A Fib  Goal 2-3  Long term  ( 5 mg tab )    RECOMMENDED DOSE 35 mg/wk  35 mg/wk  37.5 mg/wk  37.5 mg/wk  37.5 mg/wk      Warfarin Mon 5 mg 5 mg 5 mg 5 mg 5 mg     Warfarin Tues 5 mg 5 mg 5 mg 5 mg 5 mg     Warfarin Wed 5 mg 5 mg 7.5 mg 7.5 mg 7.5 mg     Warfarin Thurs 5 mg 5 mg 5 mg 5 mg 5 mg     Warfarin Fri 5 mg 5 mg 5 mg 5 mg 5 mg     Warfarin Sat 5 mg 5 mg 5 mg 5 mg 5 mg     Warfarin Sun 5 mg 5 mg 5 mg 5 mg 5 mg      2.42

## 2019-08-23 NOTE — CHART NOTE - NSCHARTNOTEFT_GEN_A_CORE
PACU ANESTHESIA ADMISSION NOTE      Procedure: Hysteroscopy with dilation and curettage of uterus using MyoSure device    Post op diagnosis:  Endometrial polyp      ____  Intubated  TV:______       Rate: ______      FiO2: ______    _x___  Patent Airway    _x___  Full return of protective reflexes    _x___  Full recovery from anesthesia / back to baseline status    Vitals:            T:  98.6              BP :132/70                R:  20            Sat:   98            P:69      Mental Status:  _x___ Awake   _____ Alert   _____ Drowsy   _____ Sedated    Nausea/Vomiting:  _x___  NO       ______Yes,   See Post - Op Orders         Pain Scale (0-10):  __0___    Treatment: _x___ None    ____ See Post - Op/PCA Orders    Post - Operative Fluids:   __x__ Oral   ____ See Post - Op Orders    Plan: Discharge:   _x___Home       _____Floor     _____Critical Care    _____  Other:_________________    Comments:  No anesthesia issues or complications noted.  Discharge when criteria met.

## 2019-08-23 NOTE — ASSESSMENT
[FreeTextEntry1] : Love is a 44 premenopausal F who presents with a prior history of an infected right breast sebaceous cyst.  \par \par On exam, she has no suspicious lesions palpated in either breast.  She did have a sebaceous cyst in her right breast that was not infected at this time.  She has no other breast related complaints at this time.  She has not palpated any new palpable masses in either breast and denies any nipple discharge or retraction.  Her most recent imaging was performed on 6/21/19, a b/l mammogram and US, which was unrevealing for any suspicious lesions in either breast, deemed BIRADS1.  She will be due for a b/l screening mammogram and US in 1 year, due on 6/21/2020.  I will have her follow up after her repeat imaging for a CBE. \par \par We discussed dense breasts.  Increasing breast density has been found to increase ones risk of breast cancer, but at this time, there is no clear indication for additional imaging in this setting, as both US and MRI have not been found to improve survival.  One can consider bilateral screening US.  However, out of 1000 women screened, the use of routine US will only identify an additional 3-5 cancers.  The use of US was found to increase the likelihood of undergoing more imaging and more biopsies.  She does have dense breasts.  We have decided to proceed with screening bilateral breast US at this time.  This will be scheduled with her next screening mammogram.\par \par In regards to her breast pain, it may be related to fibrocystic changes within her breast that are hormonally influenced. We spoke about possible interventions including evening primrose oil, supportive bras, and decreasing caffeine intake.  Although none of these have been consistently proven to improve breast pain, they may be tried.  If the pain becomes very severe, there have been studies of tamoxifen being effective for the treatment of breast pain, although there are risks with tamoxifen.  At this time she will try supportive measures. \par \par She is otherwise at an average risk for breast cancer and should continue with annual screening mammogram and US. \par \par All of her questions were answered.  She knows to call with any further questions or concerns. \par \par PLAN: \par -b/l mammogram and US on 6/21/2020 \par -f/up after

## 2019-08-23 NOTE — PAST MEDICAL HISTORY
[Menstruating] : The patient is menstruating [Menarche Age ____] : age at menarche was [unfilled] [Definite ___ (Date)] : the last menstrual period was [unfilled] [Normal Amount/Duration] : it was of a normal amount and duration [Irregular Cycle Intervals] : are  irregular [Total Preg ___] : G[unfilled] [Live Births ___] : P[unfilled]  [Age At Live Birth ___] : Age at live birth: [unfilled] [History of Hormone Replacement Treatment] : has no history of hormone replacement treatment [FreeTextEntry5] : tubal ligation [FreeTextEntry6] : denies [FreeTextEntry7] : denies [FreeTextEntry8] : yes x 2 years

## 2019-08-23 NOTE — BRIEF OPERATIVE NOTE - OPERATION/FINDINGS
EUA: 12-14 week sized, anteverted uterus, no gross masses, normal contour, cervix closed.  On hysteroscopic exam, 2cm anterior polyp at 12 o'clock seen, bilateral ostia visualized.

## 2019-08-23 NOTE — HISTORY OF PRESENT ILLNESS
[FreeTextEntry1] : Love is a 44 F who presents with right breast pain and 2 prior episodes of an infected sebaceous cyst on her right breast. \par \par She has always had cyclical breast pain, however starting about 1 year prior, she developed an infection in her right breast, likely related to an infected sebaceous cyst which resolved with a course of antibiotics.  The infection returned 1 month after, which resolved with antibiotics and spontaneously drained.  She has not had any recurrence of her infection since then, but does occasionally get cyclical breast pain. \par \par She has no other breast related complaints at this time.  She has not palpated any new palpable masses in either breast and denies any nipple discharge or retraction.  Her most recent imaging was performed on 19, a b/l mammogram and US, which was unrevealing for any suspicious lesions in either breast, deemed BIRADS1.\par \par HISTORICAL RISK FACTORS \par -no prior breast biopsies or surgeries \par -no family history of breast or ovarian cancer \par -, age at first live birth was 23 \par -no prior OCP use \par \par

## 2019-08-23 NOTE — DATA REVIEWED
[FreeTextEntry1] : EXAM: US BREAST LIMITED BI \par EXAM: MG MAMMO DIAG W KIT BI# \par \par \par PROCEDURE DATE: 06/21/2019 \par \par \par \par INTERPRETATION: Clinical History / Reason for exam: Patient presents with \par an area of focal pain in the upper outer quadrant of the left breast and \par lower outer quadrant of the right breast. \par \par The patient reports her last clinical breast examination was performed \par within the past month. \par \par Spot magnification imaging of the symptomatic areas was performed in \par addition to routine mammographic projections including tomosynthesis. \par \par Computer-aided detection was utilized in the interpretation of this \par examination. \par \par No old films are available for comparison as this is her baseline study. \par \par Breast composition:The breasts are heterogeneously dense, which may obscure \par small masses. \par \par There are no suspicious masses, areas of architectural distortion or cluster \par of microcalcifications in either breast. The spot magnification views of the \par symptomatic area show no suspicious findings. \par \par Targeted Bilateral Breast Sonogram: \par \par No solid/cystic lesions or areas of abnormal shadowing are seen. \par \par IMPRESSION:No mammographic or sonographic correlate for the reported area of \par focal pain each breast. Clinical follow-up is recommended and further \par management of this patient should be based on the level of clinical concern. \par \par Recommendation: Unless otherwise indicated by clinical findings, annual \par screening mammography recommended. \par \par BI-RADS Category 1: Negative \par \par \par \par \par \par \par \par BIRD CROCKETT M.D., ATTENDING RADIOLOGIST \par This document has been electronically signed. Jun 21 2019 9:59AM \par \par

## 2019-08-23 NOTE — REVIEW OF SYSTEMS
[As Noted in HPI] : as noted in HPI [Skin Wound] : skin wound [Breast Pain] : breast pain [Negative] : Heme/Lymph [Abn Vaginal Bleeding] : no unexplained vaginal bleeding [Skin Lesions] : no skin lesions [Breast Lump] : no breast lump

## 2019-08-23 NOTE — CONSULT LETTER
[Dear  ___] : Dear  [unfilled], [Please see my note below.] : Please see my note below. [Consult Letter:] : I had the pleasure of evaluating your patient, [unfilled]. [Consult Closing:] : Thank you very much for allowing me to participate in the care of this patient.  If you have any questions, please do not hesitate to contact me. [Sincerely,] : Sincerely, [FreeTextEntry2] : Fernando Rodrigues MD\par [address]\[place]  [address]\par  [FreeTextEntry3] : Atiya Pisano MD \par Breast Surgical Oncologist\par [facility]\par [facility]\par [facility]\par  [DrDylan  ___] : Dr. ALEMAN

## 2019-08-23 NOTE — BRIEF OPERATIVE NOTE - NSICDXBRIEFPROCEDURE_GEN_ALL_CORE_FT
PROCEDURES:  Hysteroscopy with dilation and curettage of uterus using MyoSure device 23-Aug-2019 08:26:24  Katalina Mao

## 2019-08-23 NOTE — ASU DISCHARGE PLAN (ADULT/PEDIATRIC) - CARE PROVIDER_API CALL
Fernando Rodrigues)  Obstetrics and Gynecology  [facility] [place] [address]  Phone: [phone]  Fax: [phone]  Follow Up Time:

## 2019-08-23 NOTE — PHYSICAL EXAM
[Normocephalic] : normocephalic [Atraumatic] : atraumatic [EOMI] : extra ocular movement intact [No Cervical Adenopathy] : no cervical adenopathy [No Supraclavicular Adenopathy] : no supraclavicular adenopathy [Symmetrical] : symmetrical [Examined in the supine and seated position] : examined in the supine and seated position [No Axillary Lymphadenopathy] : no right axillary lymphadenopathy [Soft] : abdomen soft [Not Tender] : non-tender [No Edema] : no edema [No Rashes] : no rashes [No Ulceration] : no ulceration [de-identified] : no suspicious breast masses palpated in either breast  [de-identified] : 3 mm sebaceous cyst in the inferior lateral breast, does not look infected, no surrounding erythema or induration

## 2019-08-26 LAB — SURGICAL PATHOLOGY STUDY: SIGNIFICANT CHANGE UP

## 2019-08-27 ENCOUNTER — OUTPATIENT (OUTPATIENT)
Dept: OUTPATIENT SERVICES | Facility: HOSPITAL | Age: 44
LOS: 1 days | Discharge: HOME | End: 2019-08-27

## 2019-08-27 DIAGNOSIS — N84.0 POLYP OF CORPUS UTERI: ICD-10-CM

## 2019-08-27 DIAGNOSIS — Z90.89 ACQUIRED ABSENCE OF OTHER ORGANS: Chronic | ICD-10-CM

## 2019-08-27 DIAGNOSIS — Z98.51 TUBAL LIGATION STATUS: Chronic | ICD-10-CM

## 2019-08-28 DIAGNOSIS — G47.33 OBSTRUCTIVE SLEEP APNEA (ADULT) (PEDIATRIC): ICD-10-CM

## 2019-09-05 ENCOUNTER — APPOINTMENT (OUTPATIENT)
Dept: OBGYN | Facility: CLINIC | Age: 44
End: 2019-09-05
Payer: COMMERCIAL

## 2019-09-05 VITALS
HEIGHT: 67 IN | WEIGHT: 293 LBS | SYSTOLIC BLOOD PRESSURE: 124 MMHG | DIASTOLIC BLOOD PRESSURE: 82 MMHG | BODY MASS INDEX: 45.99 KG/M2

## 2019-09-05 DIAGNOSIS — N84.0 POLYP OF CORPUS UTERI: ICD-10-CM

## 2019-09-05 PROCEDURE — 99213 OFFICE O/P EST LOW 20 MIN: CPT

## 2019-09-10 ENCOUNTER — APPOINTMENT (OUTPATIENT)
Dept: SURGERY | Facility: CLINIC | Age: 44
End: 2019-09-10
Payer: COMMERCIAL

## 2019-09-10 VITALS — WEIGHT: 293 LBS | HEIGHT: 67 IN | BODY MASS INDEX: 45.99 KG/M2

## 2019-09-10 PROCEDURE — 99212 OFFICE O/P EST SF 10 MIN: CPT

## 2019-09-21 ENCOUNTER — OUTPATIENT (OUTPATIENT)
Dept: OUTPATIENT SERVICES | Facility: HOSPITAL | Age: 44
LOS: 1 days | Discharge: HOME | End: 2019-09-21
Payer: COMMERCIAL

## 2019-09-21 DIAGNOSIS — Z98.51 TUBAL LIGATION STATUS: Chronic | ICD-10-CM

## 2019-09-21 DIAGNOSIS — R59.0 LOCALIZED ENLARGED LYMPH NODES: ICD-10-CM

## 2019-09-21 DIAGNOSIS — Z90.89 ACQUIRED ABSENCE OF OTHER ORGANS: Chronic | ICD-10-CM

## 2019-09-21 PROCEDURE — 76856 US EXAM PELVIC COMPLETE: CPT | Mod: 26

## 2019-10-01 ENCOUNTER — APPOINTMENT (OUTPATIENT)
Dept: SURGERY | Facility: CLINIC | Age: 44
End: 2019-10-01
Payer: COMMERCIAL

## 2019-10-01 VITALS — BODY MASS INDEX: 45.99 KG/M2 | WEIGHT: 293 LBS | HEIGHT: 67 IN

## 2019-10-01 DIAGNOSIS — D17.9 BENIGN LIPOMATOUS NEOPLASM, UNSPECIFIED: ICD-10-CM

## 2019-10-01 PROCEDURE — 99212 OFFICE O/P EST SF 10 MIN: CPT

## 2019-10-11 ENCOUNTER — OUTPATIENT (OUTPATIENT)
Dept: OUTPATIENT SERVICES | Facility: HOSPITAL | Age: 44
LOS: 1 days | Discharge: HOME | End: 2019-10-11
Payer: COMMERCIAL

## 2019-10-11 VITALS
TEMPERATURE: 98 F | WEIGHT: 293 LBS | SYSTOLIC BLOOD PRESSURE: 140 MMHG | HEART RATE: 80 BPM | HEIGHT: 67 IN | OXYGEN SATURATION: 98 % | DIASTOLIC BLOOD PRESSURE: 76 MMHG | RESPIRATION RATE: 16 BRPM

## 2019-10-11 DIAGNOSIS — K42.9 UMBILICAL HERNIA WITHOUT OBSTRUCTION OR GANGRENE: ICD-10-CM

## 2019-10-11 DIAGNOSIS — Z98.890 OTHER SPECIFIED POSTPROCEDURAL STATES: Chronic | ICD-10-CM

## 2019-10-11 DIAGNOSIS — D36.0 BENIGN NEOPLASM OF LYMPH NODES: ICD-10-CM

## 2019-10-11 DIAGNOSIS — Z90.89 ACQUIRED ABSENCE OF OTHER ORGANS: Chronic | ICD-10-CM

## 2019-10-11 DIAGNOSIS — Z01.818 ENCOUNTER FOR OTHER PREPROCEDURAL EXAMINATION: ICD-10-CM

## 2019-10-11 DIAGNOSIS — Z98.51 TUBAL LIGATION STATUS: Chronic | ICD-10-CM

## 2019-10-11 LAB
ALBUMIN SERPL ELPH-MCNC: 4.4 G/DL — SIGNIFICANT CHANGE UP (ref 3.5–5.2)
ALP SERPL-CCNC: 57 U/L — SIGNIFICANT CHANGE UP (ref 30–115)
ALT FLD-CCNC: 12 U/L — SIGNIFICANT CHANGE UP (ref 0–41)
ANION GAP SERPL CALC-SCNC: 11 MMOL/L — SIGNIFICANT CHANGE UP (ref 7–14)
APPEARANCE UR: CLEAR — SIGNIFICANT CHANGE UP
APTT BLD: 32.5 SEC — SIGNIFICANT CHANGE UP (ref 27–39.2)
AST SERPL-CCNC: 15 U/L — SIGNIFICANT CHANGE UP (ref 0–41)
BACTERIA # UR AUTO: ABNORMAL
BASOPHILS # BLD AUTO: 0.03 K/UL — SIGNIFICANT CHANGE UP (ref 0–0.2)
BASOPHILS NFR BLD AUTO: 0.4 % — SIGNIFICANT CHANGE UP (ref 0–1)
BILIRUB SERPL-MCNC: 0.2 MG/DL — SIGNIFICANT CHANGE UP (ref 0.2–1.2)
BILIRUB UR-MCNC: NEGATIVE — SIGNIFICANT CHANGE UP
BUN SERPL-MCNC: 14 MG/DL — SIGNIFICANT CHANGE UP (ref 10–20)
CALCIUM SERPL-MCNC: 9 MG/DL — SIGNIFICANT CHANGE UP (ref 8.5–10.1)
CHLORIDE SERPL-SCNC: 103 MMOL/L — SIGNIFICANT CHANGE UP (ref 98–110)
CO2 SERPL-SCNC: 25 MMOL/L — SIGNIFICANT CHANGE UP (ref 17–32)
COLOR SPEC: YELLOW — SIGNIFICANT CHANGE UP
CREAT SERPL-MCNC: 0.6 MG/DL — LOW (ref 0.7–1.5)
DIFF PNL FLD: NEGATIVE — SIGNIFICANT CHANGE UP
EOSINOPHIL # BLD AUTO: 0.17 K/UL — SIGNIFICANT CHANGE UP (ref 0–0.7)
EOSINOPHIL NFR BLD AUTO: 2.5 % — SIGNIFICANT CHANGE UP (ref 0–8)
EPI CELLS # UR: 15 /HPF — HIGH (ref 0–5)
GLUCOSE SERPL-MCNC: 103 MG/DL — HIGH (ref 70–99)
GLUCOSE UR QL: NEGATIVE — SIGNIFICANT CHANGE UP
HCT VFR BLD CALC: 38.5 % — SIGNIFICANT CHANGE UP (ref 37–47)
HGB BLD-MCNC: 11.6 G/DL — LOW (ref 12–16)
HYALINE CASTS # UR AUTO: 1 /LPF — SIGNIFICANT CHANGE UP (ref 0–7)
IMM GRANULOCYTES NFR BLD AUTO: 0.6 % — HIGH (ref 0.1–0.3)
INR BLD: 1 RATIO — SIGNIFICANT CHANGE UP (ref 0.65–1.3)
KETONES UR-MCNC: NEGATIVE — SIGNIFICANT CHANGE UP
LEUKOCYTE ESTERASE UR-ACNC: ABNORMAL
LYMPHOCYTES # BLD AUTO: 1.84 K/UL — SIGNIFICANT CHANGE UP (ref 1.2–3.4)
LYMPHOCYTES # BLD AUTO: 27.3 % — SIGNIFICANT CHANGE UP (ref 20.5–51.1)
MCHC RBC-ENTMCNC: 19 PG — LOW (ref 27–31)
MCHC RBC-ENTMCNC: 30.1 G/DL — LOW (ref 32–37)
MCV RBC AUTO: 63.2 FL — LOW (ref 81–99)
MONOCYTES # BLD AUTO: 0.33 K/UL — SIGNIFICANT CHANGE UP (ref 0.1–0.6)
MONOCYTES NFR BLD AUTO: 4.9 % — SIGNIFICANT CHANGE UP (ref 1.7–9.3)
NEUTROPHILS # BLD AUTO: 4.33 K/UL — SIGNIFICANT CHANGE UP (ref 1.4–6.5)
NEUTROPHILS NFR BLD AUTO: 64.3 % — SIGNIFICANT CHANGE UP (ref 42.2–75.2)
NITRITE UR-MCNC: NEGATIVE — SIGNIFICANT CHANGE UP
NRBC # BLD: 0 /100 WBCS — SIGNIFICANT CHANGE UP (ref 0–0)
PH UR: 7 — SIGNIFICANT CHANGE UP (ref 5–8)
PLATELET # BLD AUTO: 244 K/UL — SIGNIFICANT CHANGE UP (ref 130–400)
POTASSIUM SERPL-MCNC: 4.4 MMOL/L — SIGNIFICANT CHANGE UP (ref 3.5–5)
POTASSIUM SERPL-SCNC: 4.4 MMOL/L — SIGNIFICANT CHANGE UP (ref 3.5–5)
PROT SERPL-MCNC: 6.6 G/DL — SIGNIFICANT CHANGE UP (ref 6–8)
PROT UR-MCNC: NEGATIVE — SIGNIFICANT CHANGE UP
PROTHROM AB SERPL-ACNC: 11.5 SEC — SIGNIFICANT CHANGE UP (ref 9.95–12.87)
RBC # BLD: 6.09 M/UL — HIGH (ref 4.2–5.4)
RBC # FLD: 18.9 % — HIGH (ref 11.5–14.5)
RBC CASTS # UR COMP ASSIST: 1 /HPF — SIGNIFICANT CHANGE UP (ref 0–4)
SODIUM SERPL-SCNC: 139 MMOL/L — SIGNIFICANT CHANGE UP (ref 135–146)
SP GR SPEC: 1.02 — SIGNIFICANT CHANGE UP (ref 1.01–1.02)
UROBILINOGEN FLD QL: SIGNIFICANT CHANGE UP
WBC # BLD: 6.74 K/UL — SIGNIFICANT CHANGE UP (ref 4.8–10.8)
WBC # FLD AUTO: 6.74 K/UL — SIGNIFICANT CHANGE UP (ref 4.8–10.8)
WBC UR QL: 3 /HPF — SIGNIFICANT CHANGE UP (ref 0–5)

## 2019-10-11 PROCEDURE — 93010 ELECTROCARDIOGRAM REPORT: CPT

## 2019-10-11 RX ORDER — SUCRALFATE 1 G
10 TABLET ORAL
Qty: 0 | Refills: 0 | DISCHARGE

## 2019-10-11 NOTE — H&P PST ADULT - NSICDXPASTMEDICALHX_GEN_ALL_CORE_FT
PAST MEDICAL HISTORY:  Anxiety     Depression     H/O gastric ulcer     HLD (hyperlipidemia)     HTN (hypertension) borderline -no meds    Hypothyroidism     Lower back pain     OA (osteoarthritis)     Obesity bmi  47.6    GRECIA on CPAP

## 2019-10-11 NOTE — H&P PST ADULT - NSICDXPASTSURGICALHX_GEN_ALL_CORE_FT
PAST SURGICAL HISTORY:  H/O tubal ligation     S/P tonsillectomy PAST SURGICAL HISTORY:  H/O tubal ligation     History of D&C HYSTEROSCOPY-AUG 2019    S/P tonsillectomy

## 2019-10-11 NOTE — H&P PST ADULT - HISTORY OF PRESENT ILLNESS
44YR OLD FEMALE STATES WAS FOUND TO HAVE ENLARGED  LT INGUINAL LYMPH NODE ON HER RECENT MRI FOR BACK PAIN. Also, has had umbilical hernia for many yrs- now got bigger with discomfort- presents for umbilical hernia repair and excision of left inguinal lymph node left groin. Denies c/o ABD PAIN, N/V, WT LOSS, CP, PALP, SOB, URI, FEVER, RASH OR UTI SYMPTOMS. Had wk up done with cardio and pulm in preparation for her bariatric surgery-otherwise has no regular brztwk-cp-jq issues. Exercise raisa 2 FOS.

## 2019-10-16 ENCOUNTER — RESULT REVIEW (OUTPATIENT)
Age: 44
End: 2019-10-16

## 2019-10-16 ENCOUNTER — OUTPATIENT (OUTPATIENT)
Dept: OUTPATIENT SERVICES | Facility: HOSPITAL | Age: 44
LOS: 1 days | Discharge: HOME | End: 2019-10-16
Payer: COMMERCIAL

## 2019-10-16 VITALS
TEMPERATURE: 98 F | HEART RATE: 75 BPM | WEIGHT: 289.91 LBS | HEIGHT: 68 IN | SYSTOLIC BLOOD PRESSURE: 121 MMHG | DIASTOLIC BLOOD PRESSURE: 59 MMHG | RESPIRATION RATE: 18 BRPM

## 2019-10-16 VITALS — HEART RATE: 86 BPM | SYSTOLIC BLOOD PRESSURE: 108 MMHG | RESPIRATION RATE: 20 BRPM | DIASTOLIC BLOOD PRESSURE: 73 MMHG

## 2019-10-16 DIAGNOSIS — Z90.89 ACQUIRED ABSENCE OF OTHER ORGANS: Chronic | ICD-10-CM

## 2019-10-16 DIAGNOSIS — Z98.51 TUBAL LIGATION STATUS: Chronic | ICD-10-CM

## 2019-10-16 DIAGNOSIS — Z98.890 OTHER SPECIFIED POSTPROCEDURAL STATES: Chronic | ICD-10-CM

## 2019-10-16 PROCEDURE — 88302 TISSUE EXAM BY PATHOLOGIST: CPT | Mod: 26

## 2019-10-16 RX ORDER — OXYCODONE AND ACETAMINOPHEN 5; 325 MG/1; MG/1
1 TABLET ORAL
Refills: 0 | Status: COMPLETED | OUTPATIENT
Start: 2019-10-16 | End: 2019-10-16

## 2019-10-16 RX ORDER — HYDROMORPHONE HYDROCHLORIDE 2 MG/ML
0.5 INJECTION INTRAMUSCULAR; INTRAVENOUS; SUBCUTANEOUS
Refills: 0 | Status: DISCONTINUED | OUTPATIENT
Start: 2019-10-16 | End: 2019-10-16

## 2019-10-16 RX ORDER — SODIUM CHLORIDE 9 MG/ML
1000 INJECTION, SOLUTION INTRAVENOUS
Refills: 0 | Status: DISCONTINUED | OUTPATIENT
Start: 2019-10-16 | End: 2019-10-16

## 2019-10-16 RX ORDER — HYDROMORPHONE HYDROCHLORIDE 2 MG/ML
1 INJECTION INTRAMUSCULAR; INTRAVENOUS; SUBCUTANEOUS
Refills: 0 | Status: DISCONTINUED | OUTPATIENT
Start: 2019-10-16 | End: 2019-10-16

## 2019-10-16 RX ORDER — ONDANSETRON 8 MG/1
4 TABLET, FILM COATED ORAL ONCE
Refills: 0 | Status: COMPLETED | OUTPATIENT
Start: 2019-10-16 | End: 2019-10-16

## 2019-10-16 RX ADMIN — SODIUM CHLORIDE 100 MILLILITER(S): 9 INJECTION, SOLUTION INTRAVENOUS at 09:31

## 2019-10-16 RX ADMIN — ONDANSETRON 4 MILLIGRAM(S): 8 TABLET, FILM COATED ORAL at 09:20

## 2019-10-16 RX ADMIN — HYDROMORPHONE HYDROCHLORIDE 1 MILLIGRAM(S): 2 INJECTION INTRAMUSCULAR; INTRAVENOUS; SUBCUTANEOUS at 09:44

## 2019-10-16 RX ADMIN — OXYCODONE AND ACETAMINOPHEN 1 TABLET(S): 5; 325 TABLET ORAL at 12:22

## 2019-10-16 RX ADMIN — HYDROMORPHONE HYDROCHLORIDE 1 MILLIGRAM(S): 2 INJECTION INTRAMUSCULAR; INTRAVENOUS; SUBCUTANEOUS at 09:48

## 2019-10-16 RX ADMIN — HYDROMORPHONE HYDROCHLORIDE 1 MILLIGRAM(S): 2 INJECTION INTRAMUSCULAR; INTRAVENOUS; SUBCUTANEOUS at 09:18

## 2019-10-16 RX ADMIN — OXYCODONE AND ACETAMINOPHEN 1 TABLET(S): 5; 325 TABLET ORAL at 12:04

## 2019-10-16 NOTE — BRIEF OPERATIVE NOTE - NSICDXBRIEFPREOP_GEN_ALL_CORE_FT
PRE-OP DIAGNOSIS:  Incarcerated umbilical hernia 16-Oct-2019 09:16:22 Incarcerated preperitoneal fat Mode Azar

## 2019-10-16 NOTE — ASU DISCHARGE PLAN (ADULT/PEDIATRIC) - CALL YOUR DOCTOR IF YOU HAVE ANY OF THE FOLLOWING:
Bleeding that does not stop/Wound/Surgical Site with redness, or foul smelling discharge or pus/Inability to tolerate liquids or foods

## 2019-10-16 NOTE — BRIEF OPERATIVE NOTE - NSICDXBRIEFPROCEDURE_GEN_ALL_CORE_FT
PROCEDURES:  Repair of incarcerated umbilical hernia in patient age 5 years or older 16-Oct-2019 09:16:09  Mode Azar PROCEDURES:  Repair of incarcerated umbilical hernia in patient age 5 years or older 16-Oct-2019 09:16:09 Repaired with small ventralex mesh Mode Azar

## 2019-10-16 NOTE — BRIEF OPERATIVE NOTE - OPERATION/FINDINGS
Incarcerated preperitoneal fat through 1 x 1 cm fascial defect. Incarcerated preperitoneal fat through 1 x 1 cm fascial defect.  Repaired with small ventralex mesh

## 2019-10-16 NOTE — ASU PATIENT PROFILE, ADULT - PMH
Anxiety    Depression    H/O gastric ulcer    HLD (hyperlipidemia)    HTN (hypertension)  borderline -no meds  Hypothyroidism    Lower back pain    OA (osteoarthritis)    Obesity  bmi  47.6  GRECIA on CPAP

## 2019-10-16 NOTE — ASU DISCHARGE PLAN (ADULT/PEDIATRIC) - ASU DC SPECIAL INSTRUCTIONSFT
Please leave your dressing on 7 days. There is no need to change the dressing until you follow up with Dr. Olguin next Tuesday.     Please empty your drain every day and record the amount.    For mild to moderate pain please take Tylenol 650 mg alternating with Motrin 600 mg every 6 hours. You should take these with meals.    For severe pain you can take Percocet 5 mg every 6 hours as needed. If you do not need this medication do not take it.     Do not anything heavy over 10-15 lbs for 3-4 weeks.      Please follow up in clinic Dr. Olguin next week on Tuesday

## 2019-10-16 NOTE — CHART NOTE - NSCHARTNOTEFT_GEN_A_CORE
PACU ANESTHESIA ADMISSION NOTE      Procedure: Repair of incarcerated umbilical hernia in patient age 5 years or older    Post op diagnosis:  Incarcerated umbilical hernia      ____  Intubated  TV:______       Rate: ______      FiO2: ______    __X__  Patent Airway    ___X_  Full return of protective reflexes    ___X_  Full recovery from anesthesia / back to baseline     Vitals:   T:           R:                  BP:                  Sat:                   P:   SEE ANESTHESIA RECORD    Mental Status:  __X__ Awake   _____ Alert   _____ Drowsy   _____ Sedated    Nausea/Vomiting:  ____ NO  ____X__Yes,   See Post - Op Orders          Pain Scale (0-10):  _____    Treatment: ____ None    ____ See Post - Op/PCA Orders    Post - Operative Fluids:   ____ Oral   ___X_ See Post - Op Orders    Plan: Discharge:   _X___Home       _____Floor     _____Critical Care    _____  Other:_________________    Comments: REPORT GIVEN

## 2019-10-17 LAB — SURGICAL PATHOLOGY STUDY: SIGNIFICANT CHANGE UP

## 2019-10-25 DIAGNOSIS — G47.33 OBSTRUCTIVE SLEEP APNEA (ADULT) (PEDIATRIC): ICD-10-CM

## 2019-10-25 DIAGNOSIS — K42.0 UMBILICAL HERNIA WITH OBSTRUCTION, WITHOUT GANGRENE: ICD-10-CM

## 2019-10-25 DIAGNOSIS — E78.5 HYPERLIPIDEMIA, UNSPECIFIED: ICD-10-CM

## 2019-10-25 DIAGNOSIS — Z87.11 PERSONAL HISTORY OF PEPTIC ULCER DISEASE: ICD-10-CM

## 2019-10-25 DIAGNOSIS — M19.90 UNSPECIFIED OSTEOARTHRITIS, UNSPECIFIED SITE: ICD-10-CM

## 2019-10-25 DIAGNOSIS — E03.9 HYPOTHYROIDISM, UNSPECIFIED: ICD-10-CM

## 2019-10-25 DIAGNOSIS — Z99.89 DEPENDENCE ON OTHER ENABLING MACHINES AND DEVICES: ICD-10-CM

## 2019-10-25 DIAGNOSIS — E66.9 OBESITY, UNSPECIFIED: ICD-10-CM

## 2019-11-12 ENCOUNTER — APPOINTMENT (OUTPATIENT)
Dept: SURGERY | Facility: CLINIC | Age: 44
End: 2019-11-12
Payer: COMMERCIAL

## 2019-11-12 VITALS — BODY MASS INDEX: 45.99 KG/M2 | HEIGHT: 67 IN | WEIGHT: 293 LBS

## 2019-11-12 DIAGNOSIS — Z00.00 ENCOUNTER FOR GENERAL ADULT MEDICAL EXAMINATION W/OUT ABNORMAL FINDINGS: ICD-10-CM

## 2019-11-12 PROCEDURE — 99212 OFFICE O/P EST SF 10 MIN: CPT

## 2019-11-22 PROBLEM — G47.33 OBSTRUCTIVE SLEEP APNEA (ADULT) (PEDIATRIC): Chronic | Status: ACTIVE | Noted: 2019-10-11

## 2019-11-22 PROBLEM — F32.9 MAJOR DEPRESSIVE DISORDER, SINGLE EPISODE, UNSPECIFIED: Chronic | Status: ACTIVE | Noted: 2019-10-11

## 2019-11-22 PROBLEM — I10 ESSENTIAL (PRIMARY) HYPERTENSION: Chronic | Status: ACTIVE | Noted: 2019-08-06

## 2019-12-04 ENCOUNTER — OUTPATIENT (OUTPATIENT)
Dept: OUTPATIENT SERVICES | Facility: HOSPITAL | Age: 44
LOS: 1 days | Discharge: HOME | End: 2019-12-04

## 2019-12-04 DIAGNOSIS — F50.9 EATING DISORDER, UNSPECIFIED: ICD-10-CM

## 2019-12-04 DIAGNOSIS — Z98.51 TUBAL LIGATION STATUS: Chronic | ICD-10-CM

## 2019-12-04 DIAGNOSIS — Z90.89 ACQUIRED ABSENCE OF OTHER ORGANS: Chronic | ICD-10-CM

## 2019-12-04 DIAGNOSIS — Z98.890 OTHER SPECIFIED POSTPROCEDURAL STATES: Chronic | ICD-10-CM

## 2019-12-10 ENCOUNTER — APPOINTMENT (OUTPATIENT)
Dept: OBGYN | Facility: CLINIC | Age: 44
End: 2019-12-10

## 2020-01-22 DIAGNOSIS — E06.3 AUTOIMMUNE THYROIDITIS: ICD-10-CM

## 2020-01-22 DIAGNOSIS — R22.9 LOCALIZED SWELLING, MASS AND LUMP, UNSPECIFIED: ICD-10-CM

## 2020-01-22 RX ORDER — CYCLOBENZAPRINE HYDROCHLORIDE 10 MG/1
10 TABLET, FILM COATED ORAL TWICE DAILY
Refills: 0 | Status: DISCONTINUED | COMMUNITY
Start: 2019-07-30 | End: 2020-01-22

## 2020-01-22 RX ORDER — ESCITALOPRAM OXALATE 10 MG/1
10 TABLET ORAL DAILY
Refills: 0 | Status: DISCONTINUED | COMMUNITY
End: 2020-01-22

## 2020-01-30 ENCOUNTER — CLINICAL ADVICE (OUTPATIENT)
Age: 45
End: 2020-01-30

## 2020-02-18 ENCOUNTER — OUTPATIENT (OUTPATIENT)
Dept: OUTPATIENT SERVICES | Facility: HOSPITAL | Age: 45
LOS: 1 days | Discharge: HOME | End: 2020-02-18
Payer: COMMERCIAL

## 2020-02-18 VITALS
RESPIRATION RATE: 18 BRPM | OXYGEN SATURATION: 98 % | SYSTOLIC BLOOD PRESSURE: 110 MMHG | HEIGHT: 67 IN | WEIGHT: 293 LBS | HEART RATE: 77 BPM | TEMPERATURE: 98 F | DIASTOLIC BLOOD PRESSURE: 70 MMHG

## 2020-02-18 DIAGNOSIS — E66.01 MORBID (SEVERE) OBESITY DUE TO EXCESS CALORIES: ICD-10-CM

## 2020-02-18 DIAGNOSIS — Z98.890 OTHER SPECIFIED POSTPROCEDURAL STATES: Chronic | ICD-10-CM

## 2020-02-18 DIAGNOSIS — Z90.89 ACQUIRED ABSENCE OF OTHER ORGANS: Chronic | ICD-10-CM

## 2020-02-18 DIAGNOSIS — Z01.818 ENCOUNTER FOR OTHER PREPROCEDURAL EXAMINATION: ICD-10-CM

## 2020-02-18 DIAGNOSIS — Z98.51 TUBAL LIGATION STATUS: Chronic | ICD-10-CM

## 2020-02-18 LAB
ALBUMIN SERPL ELPH-MCNC: 4.1 G/DL — SIGNIFICANT CHANGE UP (ref 3.5–5.2)
ALP SERPL-CCNC: 58 U/L — SIGNIFICANT CHANGE UP (ref 30–115)
ALT FLD-CCNC: 19 U/L — SIGNIFICANT CHANGE UP (ref 0–41)
ANION GAP SERPL CALC-SCNC: 9 MMOL/L — SIGNIFICANT CHANGE UP (ref 7–14)
APPEARANCE UR: CLEAR — SIGNIFICANT CHANGE UP
APTT BLD: 28.5 SEC — SIGNIFICANT CHANGE UP (ref 27–39.2)
AST SERPL-CCNC: 17 U/L — SIGNIFICANT CHANGE UP (ref 0–41)
BASOPHILS # BLD AUTO: 0.03 K/UL — SIGNIFICANT CHANGE UP (ref 0–0.2)
BASOPHILS NFR BLD AUTO: 0.5 % — SIGNIFICANT CHANGE UP (ref 0–1)
BILIRUB SERPL-MCNC: 0.3 MG/DL — SIGNIFICANT CHANGE UP (ref 0.2–1.2)
BILIRUB UR-MCNC: NEGATIVE — SIGNIFICANT CHANGE UP
BLD GP AB SCN SERPL QL: SIGNIFICANT CHANGE UP
BUN SERPL-MCNC: 12 MG/DL — SIGNIFICANT CHANGE UP (ref 10–20)
CALCIUM SERPL-MCNC: 9 MG/DL — SIGNIFICANT CHANGE UP (ref 8.5–10.1)
CHLORIDE SERPL-SCNC: 105 MMOL/L — SIGNIFICANT CHANGE UP (ref 98–110)
CO2 SERPL-SCNC: 26 MMOL/L — SIGNIFICANT CHANGE UP (ref 17–32)
COLOR SPEC: YELLOW — SIGNIFICANT CHANGE UP
CREAT SERPL-MCNC: 0.6 MG/DL — LOW (ref 0.7–1.5)
DIFF PNL FLD: NEGATIVE — SIGNIFICANT CHANGE UP
EOSINOPHIL # BLD AUTO: 0.25 K/UL — SIGNIFICANT CHANGE UP (ref 0–0.7)
EOSINOPHIL NFR BLD AUTO: 3.9 % — SIGNIFICANT CHANGE UP (ref 0–8)
ESTIMATED AVERAGE GLUCOSE: 117 MG/DL — HIGH (ref 68–114)
GLUCOSE SERPL-MCNC: 100 MG/DL — HIGH (ref 70–99)
GLUCOSE UR QL: NEGATIVE — SIGNIFICANT CHANGE UP
HBA1C BLD-MCNC: 5.7 % — HIGH (ref 4–5.6)
HCT VFR BLD CALC: 38.2 % — SIGNIFICANT CHANGE UP (ref 37–47)
HGB BLD-MCNC: 11.4 G/DL — LOW (ref 12–16)
IMM GRANULOCYTES NFR BLD AUTO: 0.6 % — HIGH (ref 0.1–0.3)
INR BLD: 1.01 RATIO — SIGNIFICANT CHANGE UP (ref 0.65–1.3)
KETONES UR-MCNC: NEGATIVE — SIGNIFICANT CHANGE UP
LEUKOCYTE ESTERASE UR-ACNC: NEGATIVE — SIGNIFICANT CHANGE UP
LYMPHOCYTES # BLD AUTO: 1.52 K/UL — SIGNIFICANT CHANGE UP (ref 1.2–3.4)
LYMPHOCYTES # BLD AUTO: 23.5 % — SIGNIFICANT CHANGE UP (ref 20.5–51.1)
MCHC RBC-ENTMCNC: 18.2 PG — LOW (ref 27–31)
MCHC RBC-ENTMCNC: 29.8 G/DL — LOW (ref 32–37)
MCV RBC AUTO: 61 FL — LOW (ref 81–99)
MONOCYTES # BLD AUTO: 0.36 K/UL — SIGNIFICANT CHANGE UP (ref 0.1–0.6)
MONOCYTES NFR BLD AUTO: 5.6 % — SIGNIFICANT CHANGE UP (ref 1.7–9.3)
NEUTROPHILS # BLD AUTO: 4.27 K/UL — SIGNIFICANT CHANGE UP (ref 1.4–6.5)
NEUTROPHILS NFR BLD AUTO: 65.9 % — SIGNIFICANT CHANGE UP (ref 42.2–75.2)
NITRITE UR-MCNC: NEGATIVE — SIGNIFICANT CHANGE UP
NRBC # BLD: 0 /100 WBCS — SIGNIFICANT CHANGE UP (ref 0–0)
PH UR: 6 — SIGNIFICANT CHANGE UP (ref 5–8)
PLATELET # BLD AUTO: 246 K/UL — SIGNIFICANT CHANGE UP (ref 130–400)
POTASSIUM SERPL-MCNC: 4.3 MMOL/L — SIGNIFICANT CHANGE UP (ref 3.5–5)
POTASSIUM SERPL-SCNC: 4.3 MMOL/L — SIGNIFICANT CHANGE UP (ref 3.5–5)
PROT SERPL-MCNC: 6.2 G/DL — SIGNIFICANT CHANGE UP (ref 6–8)
PROT UR-MCNC: SIGNIFICANT CHANGE UP
PROTHROM AB SERPL-ACNC: 11.6 SEC — SIGNIFICANT CHANGE UP (ref 9.95–12.87)
RBC # BLD: 6.26 M/UL — HIGH (ref 4.2–5.4)
RBC # FLD: 20.6 % — HIGH (ref 11.5–14.5)
SODIUM SERPL-SCNC: 140 MMOL/L — SIGNIFICANT CHANGE UP (ref 135–146)
SP GR SPEC: 1.03 — HIGH (ref 1.01–1.02)
UROBILINOGEN FLD QL: SIGNIFICANT CHANGE UP
WBC # BLD: 6.47 K/UL — SIGNIFICANT CHANGE UP (ref 4.8–10.8)
WBC # FLD AUTO: 6.47 K/UL — SIGNIFICANT CHANGE UP (ref 4.8–10.8)

## 2020-02-18 PROCEDURE — 93010 ELECTROCARDIOGRAM REPORT: CPT

## 2020-02-18 RX ORDER — ESCITALOPRAM OXALATE 10 MG/1
1 TABLET, FILM COATED ORAL
Qty: 0 | Refills: 0 | DISCHARGE

## 2020-02-18 RX ORDER — OMEPRAZOLE 10 MG/1
1 CAPSULE, DELAYED RELEASE ORAL
Qty: 0 | Refills: 0 | DISCHARGE

## 2020-02-18 NOTE — H&P PST ADULT - NSICDXPASTMEDICALHX_GEN_ALL_CORE_FT
PAST MEDICAL HISTORY:  Anxiety     Depression     H/O gastric ulcer     H/O thalassemia minor     HLD (hyperlipidemia)     HTN (hypertension) borderline -no meds    Hypothyroidism     Lower back pain     OA (osteoarthritis)     Obesity bmi  47.6    GRECIA on CPAP

## 2020-02-18 NOTE — H&P PST ADULT - NSICDXPASTSURGICALHX_GEN_ALL_CORE_FT
PAST SURGICAL HISTORY:  H/O tubal ligation     H/O umbilical hernia repair     History of D&C HYSTEROSCOPY-AUG 2019    S/P tonsillectomy

## 2020-02-18 NOTE — H&P PST ADULT - HISTORY OF PRESENT ILLNESS
45 Y/O FEMALE PRESENTS TO PAST WITH HX MORBID OBESITY  PT NOW FOR SCHEDULED PROCEDURE. PT DENIES ANY CP SOB PALP COUGH DYSURIA FEVER URI. PT ABLE TO LUZ 1-2 FOS W/O SOB

## 2020-02-20 PROBLEM — Z86.2 PERSONAL HISTORY OF DISEASES OF THE BLOOD AND BLOOD-FORMING ORGANS AND CERTAIN DISORDERS INVOLVING THE IMMUNE MECHANISM: Chronic | Status: ACTIVE | Noted: 2020-02-18

## 2020-02-21 ENCOUNTER — APPOINTMENT (OUTPATIENT)
Dept: SURGERY | Facility: CLINIC | Age: 45
End: 2020-02-21
Payer: COMMERCIAL

## 2020-02-21 VITALS
DIASTOLIC BLOOD PRESSURE: 80 MMHG | SYSTOLIC BLOOD PRESSURE: 126 MMHG | HEIGHT: 67 IN | BODY MASS INDEX: 45.99 KG/M2 | WEIGHT: 293 LBS

## 2020-02-21 PROCEDURE — 99215 OFFICE O/P EST HI 40 MIN: CPT

## 2020-03-02 NOTE — HISTORY OF PRESENT ILLNESS
[de-identified] : She is scheduled for Laparoscopic Gastric Bypass/Sleeve Gastrectomy on 3/3/2020. The patient states that she has been overweight for several years and has tried multiple weight loss modalities in the past without any long-term sustainable weight loss.

## 2020-03-02 NOTE — ADDENDUM
[FreeTextEntry1] : Patient seen, examined and reviewed with practitioner.  I agree with the assessment and plan; note amended as appropriate.\par Risks, benefits and alternatives to the procedure were discussed with the patient and questions answered.  Consent was obtained. The patient is ready to proceed with surgery.\par

## 2020-03-02 NOTE — CONSULT LETTER
[Courtesy Letter:] : I had the pleasure of seeing your patient, [unfilled], in my office today. [Please see my note below.] : Please see my note below. [Sincerely,] : Sincerely, [Consult Closing:] : Thank you very much for allowing me to participate in the care of this patient.  If you have any questions, please do not hesitate to contact me. [Dear  ___] : Dear  [unfilled], [FreeTextEntry3] : Kim Portillo MD, FACS, FASMBS, Diplo ABOM\par Director, [facility]\par Chief, General, Bariatric & Minimally Invasive Surgery\par Director, Quality & Performance Improvement\par Department of Surgery\par [facility] \par [facility]\par

## 2020-03-02 NOTE — ASSESSMENT
[FreeTextEntry1] : MAHI KEBEDE is a 44 year female seen today for Preoperative Bariatric visit.  All medications were reviewed with patient and instructions were given in respect to medications to take on the day of surgery as well as postoperatively. Written instructions and materials were provided.  All questions were answered.\par

## 2020-03-02 NOTE — REVIEW OF SYSTEMS
[Muscle Pain] : muscle pain [Joint Stiffness] : joint stiffness [Negative] : Allergic/Immunologic [FreeTextEntry9] : back and neck

## 2020-03-03 ENCOUNTER — RESULT REVIEW (OUTPATIENT)
Age: 45
End: 2020-03-03

## 2020-03-03 ENCOUNTER — APPOINTMENT (OUTPATIENT)
Dept: SURGERY | Facility: HOSPITAL | Age: 45
End: 2020-03-03
Payer: COMMERCIAL

## 2020-03-03 ENCOUNTER — INPATIENT (INPATIENT)
Facility: HOSPITAL | Age: 45
LOS: 0 days | Discharge: HOME | End: 2020-03-04
Attending: SURGERY | Admitting: SURGERY
Payer: COMMERCIAL

## 2020-03-03 VITALS
WEIGHT: 293 LBS | TEMPERATURE: 98 F | HEIGHT: 67 IN | HEART RATE: 88 BPM | DIASTOLIC BLOOD PRESSURE: 61 MMHG | RESPIRATION RATE: 20 BRPM | SYSTOLIC BLOOD PRESSURE: 121 MMHG

## 2020-03-03 DIAGNOSIS — Z98.890 OTHER SPECIFIED POSTPROCEDURAL STATES: Chronic | ICD-10-CM

## 2020-03-03 DIAGNOSIS — Z90.89 ACQUIRED ABSENCE OF OTHER ORGANS: Chronic | ICD-10-CM

## 2020-03-03 DIAGNOSIS — Z98.51 TUBAL LIGATION STATUS: Chronic | ICD-10-CM

## 2020-03-03 LAB — GLUCOSE BLDC GLUCOMTR-MCNC: 97 MG/DL — SIGNIFICANT CHANGE UP (ref 70–99)

## 2020-03-03 PROCEDURE — 88305 TISSUE EXAM BY PATHOLOGIST: CPT | Mod: 26

## 2020-03-03 PROCEDURE — 99291 CRITICAL CARE FIRST HOUR: CPT

## 2020-03-03 PROCEDURE — 43644 LAP GASTRIC BYPASS/ROUX-EN-Y: CPT

## 2020-03-03 RX ORDER — HEPARIN SODIUM 5000 [USP'U]/ML
5000 INJECTION INTRAVENOUS; SUBCUTANEOUS EVERY 8 HOURS
Refills: 0 | Status: DISCONTINUED | OUTPATIENT
Start: 2020-03-03 | End: 2020-03-04

## 2020-03-03 RX ORDER — ONDANSETRON 8 MG/1
4 TABLET, FILM COATED ORAL EVERY 6 HOURS
Refills: 0 | Status: DISCONTINUED | OUTPATIENT
Start: 2020-03-03 | End: 2020-03-04

## 2020-03-03 RX ORDER — ACETAMINOPHEN 500 MG
1000 TABLET ORAL ONCE
Refills: 0 | Status: DISCONTINUED | OUTPATIENT
Start: 2020-03-03 | End: 2020-03-04

## 2020-03-03 RX ORDER — BUPIVACAINE 13.3 MG/ML
20 INJECTION, SUSPENSION, LIPOSOMAL INFILTRATION ONCE
Refills: 0 | Status: DISCONTINUED | OUTPATIENT
Start: 2020-03-03 | End: 2020-03-04

## 2020-03-03 RX ORDER — HYDROMORPHONE HYDROCHLORIDE 2 MG/ML
0.5 INJECTION INTRAMUSCULAR; INTRAVENOUS; SUBCUTANEOUS
Refills: 0 | Status: DISCONTINUED | OUTPATIENT
Start: 2020-03-03 | End: 2020-03-03

## 2020-03-03 RX ORDER — ACETAMINOPHEN 500 MG
1000 TABLET ORAL ONCE
Refills: 0 | Status: COMPLETED | OUTPATIENT
Start: 2020-03-03 | End: 2020-03-03

## 2020-03-03 RX ORDER — SODIUM CHLORIDE 9 MG/ML
1000 INJECTION, SOLUTION INTRAVENOUS
Refills: 0 | Status: DISCONTINUED | OUTPATIENT
Start: 2020-03-03 | End: 2020-03-04

## 2020-03-03 RX ORDER — CEFOTETAN DISODIUM 1 G
2 VIAL (EA) INJECTION EVERY 12 HOURS
Refills: 0 | Status: DISCONTINUED | OUTPATIENT
Start: 2020-03-03 | End: 2020-03-04

## 2020-03-03 RX ORDER — KETOROLAC TROMETHAMINE 30 MG/ML
15 SYRINGE (ML) INJECTION EVERY 6 HOURS
Refills: 0 | Status: DISCONTINUED | OUTPATIENT
Start: 2020-03-03 | End: 2020-03-04

## 2020-03-03 RX ORDER — HEPARIN SODIUM 5000 [USP'U]/ML
5000 INJECTION INTRAVENOUS; SUBCUTANEOUS ONCE
Refills: 0 | Status: DISCONTINUED | OUTPATIENT
Start: 2020-03-03 | End: 2020-03-04

## 2020-03-03 RX ORDER — MORPHINE SULFATE 50 MG/1
4 CAPSULE, EXTENDED RELEASE ORAL
Refills: 0 | Status: DISCONTINUED | OUTPATIENT
Start: 2020-03-03 | End: 2020-03-03

## 2020-03-03 RX ORDER — SODIUM CHLORIDE 9 MG/ML
1000 INJECTION, SOLUTION INTRAVENOUS
Refills: 0 | Status: DISCONTINUED | OUTPATIENT
Start: 2020-03-03 | End: 2020-03-03

## 2020-03-03 RX ORDER — PANTOPRAZOLE SODIUM 20 MG/1
40 TABLET, DELAYED RELEASE ORAL DAILY
Refills: 0 | Status: DISCONTINUED | OUTPATIENT
Start: 2020-03-03 | End: 2020-03-04

## 2020-03-03 RX ORDER — GABAPENTIN 400 MG/1
300 CAPSULE ORAL EVERY 8 HOURS
Refills: 0 | Status: DISCONTINUED | OUTPATIENT
Start: 2020-03-03 | End: 2020-03-04

## 2020-03-03 RX ORDER — GABAPENTIN 400 MG/1
125 CAPSULE ORAL EVERY 8 HOURS
Refills: 0 | Status: DISCONTINUED | OUTPATIENT
Start: 2020-03-03 | End: 2020-03-03

## 2020-03-03 RX ADMIN — Medication 15 MILLIGRAM(S): at 20:52

## 2020-03-03 RX ADMIN — Medication 1000 MILLIGRAM(S): at 19:11

## 2020-03-03 RX ADMIN — MORPHINE SULFATE 4 MILLIGRAM(S): 50 CAPSULE, EXTENDED RELEASE ORAL at 11:45

## 2020-03-03 RX ADMIN — HEPARIN SODIUM 5000 UNIT(S): 5000 INJECTION INTRAVENOUS; SUBCUTANEOUS at 14:02

## 2020-03-03 RX ADMIN — PANTOPRAZOLE SODIUM 40 MILLIGRAM(S): 20 TABLET, DELAYED RELEASE ORAL at 12:42

## 2020-03-03 RX ADMIN — GABAPENTIN 125 MILLIGRAM(S): 400 CAPSULE ORAL at 14:02

## 2020-03-03 RX ADMIN — MORPHINE SULFATE 4 MILLIGRAM(S): 50 CAPSULE, EXTENDED RELEASE ORAL at 12:00

## 2020-03-03 RX ADMIN — Medication 15 MILLIGRAM(S): at 12:27

## 2020-03-03 RX ADMIN — Medication 400 MILLIGRAM(S): at 18:03

## 2020-03-03 RX ADMIN — Medication 15 MILLIGRAM(S): at 12:54

## 2020-03-03 RX ADMIN — Medication 15 MILLIGRAM(S): at 22:24

## 2020-03-03 RX ADMIN — GABAPENTIN 300 MILLIGRAM(S): 400 CAPSULE ORAL at 21:13

## 2020-03-03 RX ADMIN — SODIUM CHLORIDE 125 MILLILITER(S): 9 INJECTION, SOLUTION INTRAVENOUS at 11:45

## 2020-03-03 RX ADMIN — Medication 100 GRAM(S): at 20:53

## 2020-03-03 RX ADMIN — ONDANSETRON 4 MILLIGRAM(S): 8 TABLET, FILM COATED ORAL at 14:34

## 2020-03-03 RX ADMIN — ONDANSETRON 4 MILLIGRAM(S): 8 TABLET, FILM COATED ORAL at 19:14

## 2020-03-03 RX ADMIN — HEPARIN SODIUM 5000 UNIT(S): 5000 INJECTION INTRAVENOUS; SUBCUTANEOUS at 21:13

## 2020-03-03 NOTE — CONSULT NOTE ADULT - SUBJECTIVE AND OBJECTIVE BOX
SICU Consultation Note  ======================================================================================================  MAHI KEBEDE  MRN-[id number]      44y Female    43 Y/O FEMALE PMH of thalassemia Minor, HTN, GRECIA on CPAP Anxiety, Depression PRESENTS TO PAST WITH HX MORBID OBESITY BMI 49 5'7" 375 lbs s/p Gastric Bypass with Bilateral Tap Block      Procedure:  OR Stats  OR Time:  150 min              EBL 5:          IV Fluids: 1800      Blood Products:                UOP:    100  Findings- Dense omental adhesions, shortened transverse mesocolon, splenosis    PMH  PAST MEDICAL & SURGICAL HISTORY:  H/O thalassemia minor  GRECIA on CPAP  Depression  Obesity: bmi  47.6  H/O gastric ulcer  OA (osteoarthritis)  Hypothyroidism  HTN (hypertension): borderline -no meds  HLD (hyperlipidemia)  Lower back pain  Anxiety  H/O umbilical hernia repair  History of D&C: HYSTEROSCOPY-AUG 2019  S/P tonsillectomy  H/O tubal ligation      Home Meds:  Home Medications:  clonazePAM 0.5 mg oral tablet: 1 tab(s) orally 3 times a day (03 Mar 2020 06:19)  gabapentin 300 mg oral capsule: 1 cap(s) orally 3 times a day, As Needed (03 Mar 2020 06:19)  levothyroxine 50 mcg (0.05 mg) oral tablet: 1 tab(s) orally once a day (03 Mar 2020 06:19)  Lexapro 20 mg oral tablet: 1 tab(s) orally once a day (03 Mar 2020 06:19)  omeprazole 40 mg oral delayed release capsule: 1 cap(s) orally once a day (03 Mar 2020 06:19)         Allergies  Allergies    No Known Allergies    Intolerances         Current Medications:  acetaminophen  IVPB .. 1000 milliGRAM(s) IV Intermittent once  acetaminophen  IVPB .. 1000 milliGRAM(s) IV Intermittent once  BUpivacaine liposome 1.3% Injectable 20 milliLiter(s) Local Injection once  gabapentin   Solution 125 milliGRAM(s) Oral every 8 hours  heparin  Injectable 5000 Unit(s) SubCutaneous every 8 hours  heparin  Injectable 5000 Unit(s) SubCutaneous once  ketorolac   Injectable 15 milliGRAM(s) IV Push every 6 hours  lactated ringers. 1000 milliLiter(s) (125 mL/Hr) IV Continuous <Continuous>  ondansetron Injectable 4 milliGRAM(s) IV Push every 6 hours PRN Nausea  pantoprazole  Injectable 40 milliGRAM(s) IV Push daily      Advanced Directives: Presumed Full Code    VITAL SIGNS, INS/OUTS (Last 24hours):    ICU Vital Signs Last 24 Hrs  T(C): 36.8 (03 Mar 2020 11:08), Max: 36.8 (03 Mar 2020 11:08)  T(F): 98.3 (03 Mar 2020 11:08), Max: 98.3 (03 Mar 2020 11:08)  HR: 80 (03 Mar 2020 11:45) (79 - 88)  BP: 118/59 (03 Mar 2020 11:45) (102/54 - 121/61)  BP(mean): --  ABP: --  ABP(mean): --  RR: 28 (03 Mar 2020 11:45) (20 - 33)  SpO2: 91% (03 Mar 2020 11:45) (91% - 96%)    I&O's Summary      Height (cm): 170.18 (03-03-20)  Weight (kg): 147.4 (03-03-20)  BMI (kg/m2): 50.9 (03-03-20)  BSA (m2): 2.49 (03-03-20)    Physical Exam:   ---------------------------------------------------------------------------------------  GCS:      Exam: A&Ox3, no focal deficits, Morbidly Obese    RESPIRATORY:  Normal expansion/effort  CPAP    CARDIOVASCULAR:   S1/S2.  RRR  No peripheral edema    GASTROINTESTINAL:  Abdomen soft, non-tender, non-distended, + 5 surgical Incisions    MUSCULOSKELETAL:  Extremities warm, pink, well-perfused. SCD    DERM:  No skin breakdown     :   Exam: Freedman catheter in place.       Tubes/Lines/Drains   ----------------------------------------------------------------------------------------------------------  [x] Peripheral IV  [X] Urinary Catheter Freedman                                             Date Placed:       Blood Sugar  CAPILLARY BLOOD GLUCOSE      POCT Blood Glucose.: 97 mg/dL (03 Mar 2020 06:01)      Urinalysis      Cultures          CT/XRAY/ECHO/TCD/EEG  ----------------------------------------------------------------------------------------------            --------------------------------------------------------------------------------------  Admit Diagnosis: E66.01, [id number]

## 2020-03-03 NOTE — CONSULT NOTE ADULT - ATTENDING COMMENTS
s/p GBP   awake and alert   extubated   NPO, IV hydration   OOB and ambulate   pain control VTE prophylaxis   admit to SICU

## 2020-03-03 NOTE — ASU PATIENT PROFILE, ADULT - AS SC BRADEN NUTRITION
"-- Message is from the [employer]--    Reason for Call: [address] REQUESTING TO [address] [facility] [address], [address] [facility]  [phone]      Alternative phone number:     Turnaround time given to caller: ""This message will be sent to [facility] Provider's name]. The clinical team will fulfill your request as soon as they review your message. \""    "
Called back,left message on machine per MD ok with him for this patient to have transportation. Please call us back if any additional quest.
Genna Halo with me
(4) excellent

## 2020-03-03 NOTE — CHART NOTE - NSCHARTNOTEFT_GEN_A_CORE
Pt seen and examined @ bedside c/o pain  Denies chest pain and shortness of breath    Vital Signs Last 24 Hrs  T(C): 36.8 (03 Mar 2020 11:08), Max: 36.8 (03 Mar 2020 11:08)  T(F): 98.3 (03 Mar 2020 11:08), Max: 98.3 (03 Mar 2020 11:08)  HR: 82 (03 Mar 2020 15:00) (77 - 88)  BP: 139/65 (03 Mar 2020 15:00) (102/54 - 144/69)  BP(mean): --  RR: 24 (03 Mar 2020 15:00) (10 - 33)  SpO2: 95% (03 Mar 2020 15:00) (91% - 97%)    On examination pt in no acute distress  CV: S1S2RR  lungs: + air entry bilat  abd: soft  incisions  D/I   : + hess catheter in place  ext: +ROM no calf tenderness  neuro alert and oriented    LABS:    A/P 45y/o Female s/p laparoscopic gastric by pass , omental biopsy POD# 0 for morbid obesity  Continue SICU management   strict I&O  f/u labs  Encourage incentive spirometry use  Continue close observation

## 2020-03-03 NOTE — ASU PATIENT PROFILE, ADULT - PMH
Anxiety    Depression    H/O gastric ulcer    H/O thalassemia minor    HLD (hyperlipidemia)    HTN (hypertension)  borderline -no meds  Hypothyroidism    Lower back pain    OA (osteoarthritis)    Obesity  bmi  47.6  GRECIA on CPAP

## 2020-03-03 NOTE — ASU PATIENT PROFILE, ADULT - TEACHING/LEARNING LEARNING PREFERENCES
Per last JSC note patient was PRN and we will not see her anymore so she needs to transition and have her PCP manage her medication.Patient voiced understanding.   verbal instruction

## 2020-03-03 NOTE — ASU PATIENT PROFILE, ADULT - PSH
H/O tubal ligation    H/O umbilical hernia repair    History of D&C  HYSTEROSCOPY-AUG 2019  S/P tonsillectomy

## 2020-03-03 NOTE — CONSULT NOTE ADULT - ASSESSMENT
Assessment & Plan    44y Female PMH of obesity BMI 49 s/p Gastric Bypass with Bilateral Tap block    NEURO:  Acute pain-controlled with Gabapentin, Tylenol, Toradol  Hx of Depression Lexapro 20, Clonazepam 0.5    RESP:   GRECIA on CPAP bedside  Oxygen insufficiency on CPAP machine  Activity- advance if tolerated      CARDS:   HTN not on medications  Normal EKG    GI/NUTR:   Diet, NPO except medications  GI Prophylaxis- PPI  Bowel regimen-  S/P Gastric bypass with Bilateral Tap block Dense omental adhesions, shortened transverse mesocolon, splenosis  pantoprazole  Injectable 40 milliGRAM(s) IV Push daily      /RENAL:   Creatinine 0.6  Freedman as per primary team    HEME/ONC:   DVT prophylaxis- SCD, Hep SubQ  Hgb 11.4      ID:  Afebrile  WBC 6.47       ENDO:  Glucose 97  HA1C 5.7 (02-18-20)  Hx of Hypothyroid on levothyroxine 50 mcg      LINES/DRAINS:  PIV, Freedman     DISPO:    SICU Assessment & Plan    44y Female PMH of obesity BMI 49 s/p Gastric Bypass with Bilateral Tap block    NEURO:  Acute pain-controlled with Gabapentin, Tylenol, Toradol  Hx of Depression Lexapro 20, Clonazepam 0.5    RESP:   GRECIA on CPAP bedside  Oxygen insufficiency on CPAP machine  Activity- advance if tolerated      CARDS:   HTN not on medications  Normal EKG      GI/NUTR:   Diet, NPO except medications  GI Prophylaxis- PPI  Bowel regimen-  S/P Gastric bypass with Bilateral Tap block Dense omental adhesions, shortened transverse mesocolon, splenosis  pantoprazole  Injectable 40 milliGRAM(s) IV Push daily      /RENAL:   Creatinine 0.6  Freedman as per primary team    HEME/ONC:   DVT prophylaxis- SCD, Hep SubQ  Hgb 11.4      ID:  Afebrile  WBC 6.47       ENDO:  Glucose 97  HA1C 5.7 (02-18-20)  Hx of Hypothyroid on levothyroxine 50 mcg      LINES/DRAINS:  PIV, Freedman     DISPO:    SICU

## 2020-03-03 NOTE — BRIEF OPERATIVE NOTE - NSICDXBRIEFPROCEDURE_GEN_ALL_CORE_FT
PROCEDURES:  Omental biopsy 03-Mar-2020 10:55:53  Juan Barrow  Laparoscopic gastric bypass 03-Mar-2020 10:55:47  Juan Barrow

## 2020-03-04 ENCOUNTER — TRANSCRIPTION ENCOUNTER (OUTPATIENT)
Age: 45
End: 2020-03-04

## 2020-03-04 VITALS
SYSTOLIC BLOOD PRESSURE: 124 MMHG | OXYGEN SATURATION: 98 % | DIASTOLIC BLOOD PRESSURE: 66 MMHG | RESPIRATION RATE: 18 BRPM | HEART RATE: 87 BPM

## 2020-03-04 LAB
ANION GAP SERPL CALC-SCNC: 12 MMOL/L — SIGNIFICANT CHANGE UP (ref 7–14)
BUN SERPL-MCNC: 10 MG/DL — SIGNIFICANT CHANGE UP (ref 10–20)
CALCIUM SERPL-MCNC: 8.5 MG/DL — SIGNIFICANT CHANGE UP (ref 8.5–10.1)
CHLORIDE SERPL-SCNC: 104 MMOL/L — SIGNIFICANT CHANGE UP (ref 98–110)
CO2 SERPL-SCNC: 24 MMOL/L — SIGNIFICANT CHANGE UP (ref 17–32)
CREAT SERPL-MCNC: 0.8 MG/DL — SIGNIFICANT CHANGE UP (ref 0.7–1.5)
GLUCOSE SERPL-MCNC: 126 MG/DL — HIGH (ref 70–99)
HCT VFR BLD CALC: 35.1 % — LOW (ref 37–47)
HGB BLD-MCNC: 10.7 G/DL — LOW (ref 12–16)
MAGNESIUM SERPL-MCNC: 2 MG/DL — SIGNIFICANT CHANGE UP (ref 1.8–2.4)
MCHC RBC-ENTMCNC: 18.4 PG — LOW (ref 27–31)
MCHC RBC-ENTMCNC: 30.5 G/DL — LOW (ref 32–37)
MCV RBC AUTO: 60.3 FL — LOW (ref 81–99)
NRBC # BLD: 0 /100 WBCS — SIGNIFICANT CHANGE UP (ref 0–0)
PHOSPHATE SERPL-MCNC: 3.3 MG/DL — SIGNIFICANT CHANGE UP (ref 2.1–4.9)
PLATELET # BLD AUTO: 254 K/UL — SIGNIFICANT CHANGE UP (ref 130–400)
POTASSIUM SERPL-MCNC: 4 MMOL/L — SIGNIFICANT CHANGE UP (ref 3.5–5)
POTASSIUM SERPL-SCNC: 4 MMOL/L — SIGNIFICANT CHANGE UP (ref 3.5–5)
RBC # BLD: 5.82 M/UL — HIGH (ref 4.2–5.4)
RBC # FLD: 21 % — HIGH (ref 11.5–14.5)
SODIUM SERPL-SCNC: 140 MMOL/L — SIGNIFICANT CHANGE UP (ref 135–146)
SURGICAL PATHOLOGY STUDY: SIGNIFICANT CHANGE UP
WBC # BLD: 12.1 K/UL — HIGH (ref 4.8–10.8)
WBC # FLD AUTO: 12.1 K/UL — HIGH (ref 4.8–10.8)

## 2020-03-04 PROCEDURE — 71045 X-RAY EXAM CHEST 1 VIEW: CPT | Mod: 26

## 2020-03-04 PROCEDURE — 99291 CRITICAL CARE FIRST HOUR: CPT | Mod: 24

## 2020-03-04 RX ORDER — ESCITALOPRAM OXALATE 10 MG/1
1 TABLET, FILM COATED ORAL
Qty: 0 | Refills: 0 | DISCHARGE

## 2020-03-04 RX ORDER — OMEPRAZOLE 10 MG/1
1 CAPSULE, DELAYED RELEASE ORAL
Qty: 0 | Refills: 0 | DISCHARGE

## 2020-03-04 RX ORDER — GABAPENTIN 400 MG/1
1 CAPSULE ORAL
Qty: 0 | Refills: 0 | DISCHARGE

## 2020-03-04 RX ORDER — CLONAZEPAM 1 MG
1 TABLET ORAL
Qty: 0 | Refills: 0 | DISCHARGE

## 2020-03-04 RX ORDER — LEVOTHYROXINE SODIUM 125 MCG
1 TABLET ORAL
Qty: 0 | Refills: 0 | DISCHARGE

## 2020-03-04 RX ORDER — LEVOTHYROXINE SODIUM 125 MCG
25 TABLET ORAL AT BEDTIME
Refills: 0 | Status: DISCONTINUED | OUTPATIENT
Start: 2020-03-04 | End: 2020-03-04

## 2020-03-04 RX ADMIN — PANTOPRAZOLE SODIUM 40 MILLIGRAM(S): 20 TABLET, DELAYED RELEASE ORAL at 12:49

## 2020-03-04 RX ADMIN — Medication 15 MILLIGRAM(S): at 02:28

## 2020-03-04 RX ADMIN — HEPARIN SODIUM 5000 UNIT(S): 5000 INJECTION INTRAVENOUS; SUBCUTANEOUS at 05:20

## 2020-03-04 RX ADMIN — GABAPENTIN 300 MILLIGRAM(S): 400 CAPSULE ORAL at 05:20

## 2020-03-04 RX ADMIN — Medication 15 MILLIGRAM(S): at 08:31

## 2020-03-04 RX ADMIN — GABAPENTIN 300 MILLIGRAM(S): 400 CAPSULE ORAL at 13:50

## 2020-03-04 RX ADMIN — Medication 100 GRAM(S): at 07:47

## 2020-03-04 NOTE — PROGRESS NOTE ADULT - ASSESSMENT
ASSESSMENT/PLAN:  NEURO:  Acute pain-controlled with Gabapentin, Tylenol, Toradol  Hx of Depression Lexapro 20, Clonazepam 0.5    RESP:   GRECIA on CPAP bedside- setting 4-> ramp to 11  on RA sat>95%  AM CXR      CARDS:   HTN not on medications  Normal EKG      GI/NUTR:   Diet, NPO except medications  GI Prophylaxis- PPI  Bowel regimen-  S/P Gastric bypass with Bilateral Tap block     /RENAL:   BUN 10 Creatinine 0.8  Freedman d/c'ed, voiding  Lytes-Na 140 // K 4.0 // Phos 3.3 //  Mag 2.0    HEME/ONC:   DVT prophylaxis- SCD, Hep SubQ  Hgb 10.7    ID:  Afebrile  WBC 12    ENDO:  HA1C 5.7  Hx of Hypothyroid on levothyroxine 50 mcg

## 2020-03-04 NOTE — DISCHARGE NOTE PROVIDER - NSDCCPCAREPLAN_GEN_ALL_CORE_FT
PRINCIPAL DISCHARGE DIAGNOSIS  Diagnosis: Elective surgical procedure  Assessment and Plan of Treatment:

## 2020-03-04 NOTE — CHART NOTE - NSCHARTNOTEFT_GEN_A_CORE
Patient complaining of left hand numbness. Patient seen and evaluated, no cellulitis, mild tenderness.     PLAN:  -Continue gabapentin  -Left arm elevation with Posi block    3/4/20 08:30

## 2020-03-04 NOTE — PROGRESS NOTE ADULT - ASSESSMENT
43y/o Female s/p laparoscopic gastric by pass , omental biopsy POD# 0 for morbid obesity    -advance to bariatric clears diet   -Continue SICU management   -strict I&O  -Encourage incentive spirometry use  -Encouarge ambulation  -Posey block for arm elevation 45y/o Female s/p laparoscopic gastric by pass , omental biopsy POD# 1.    -advance to bariatric clears diet   -Continue SICU management   -strict I&O  -Encourage incentive spirometry use  -Encouarge ambulation  -Posey block for arm elevation

## 2020-03-04 NOTE — PROGRESS NOTE ADULT - SUBJECTIVE AND OBJECTIVE BOX
[facility]  [id number]  44y Female    Indication for ICU admission: s/p Gastric Bypass with Bilateral Tap BlockProcedure  Admit Date:03-03-20  ICU Date: 3/3/20  OR Date: 3/3/20    No Known Allergies    PAST MEDICAL & SURGICAL HISTORY:  H/O thalassemia minor  GRECIA on CPAP  Depression  Obesity: bmi  47.6  H/O gastric ulcer  OA (osteoarthritis)  Hypothyroidism  HTN (hypertension): borderline -no meds  HLD (hyperlipidemia)  Lower back pain  Anxiety  H/O umbilical hernia repair  History of D&C: HYSTEROSCOPY-AUG 2019  S/P tonsillectomy  H/O tubal ligation    Home Medications:  clonazePAM 0.5 mg oral tablet: 1 tab(s) orally 3 times a day (03 Mar 2020 06:19)  gabapentin 300 mg oral capsule: 1 cap(s) orally 3 times a day, As Needed (03 Mar 2020 06:19)  levothyroxine 50 mcg (0.05 mg) oral tablet: 1 tab(s) orally once a day (03 Mar 2020 06:19)  Lexapro 20 mg oral tablet: 1 tab(s) orally once a day (03 Mar 2020 06:19)  omeprazole 40 mg oral delayed release capsule: 1 cap(s) orally once a day (03 Mar 2020 06:19)    44y Female PMH of obesity BMI 49 s/p Gastric Bypass with Bilateral Tap block    24 Hours events: No acute events overnight, patient is ambulating, voiding, pain controlled. Tolerating deepika phase 1 diet, 1oz/hr.     NEURO:  Acute pain-controlled with Gabapentin, Tylenol, Toradol  Hx of Depression Lexapro 20, Clonazepam 0.5    RESP:   GRECIA on CPAP bedside- setting 4-> ramp to 11  on RA sat>95%  AM CXR      CARDS:   HTN not on medications  Normal EKG      GI/NUTR:   Diet, NPO except medications  GI Prophylaxis- PPI  Bowel regimen-  S/P Gastric bypass with Bilateral Tap block     /RENAL:   BUN 10 Creatinine 0.8  Freedman d/c'ed, voiding  Lytes-Na 140 // K 4.0 // Phos 3.3 //  Mag 2.0    HEME/ONC:   DVT prophylaxis- SCD, Hep SubQ  Hgb 10.7    ID:  Afebrile  WBC 12    ENDO:  HA1C 5.7  Hx of Hypothyroid on levothyroxine 50 mcg    LINES/DRAINS:  PIV  DISPO:    SICU    ***Tubes/Lines/Drains  ***  Peripheral IV	     [X] A ten-point review of systems was otherwise negative except as noted above.  [  ] Due to altered mental status/intubation, subjective information was not attained from the patient. History was obtained, to the extent possible, from review of the chart and collateral sources of information. [facility]  [id number]  44y Female    Indication for ICU admission: s/p Gastric Bypass with Bilateral Tap BlockProcedure  Admit Date:03-03-20  ICU Date: 3/3/20  OR Date: 3/3/20    No Known Allergies    PAST MEDICAL & SURGICAL HISTORY:  H/O thalassemia minor  GRECIA on CPAP  Depression  Obesity: bmi  47.6  H/O gastric ulcer  OA (osteoarthritis)  Hypothyroidism  HTN (hypertension): borderline -no meds  HLD (hyperlipidemia)  Lower back pain  Anxiety  H/O umbilical hernia repair  History of D&C: HYSTEROSCOPY-AUG 2019  S/P tonsillectomy  H/O tubal ligation    Home Medications:  clonazePAM 0.5 mg oral tablet: 1 tab(s) orally 3 times a day (03 Mar 2020 06:19)  gabapentin 300 mg oral capsule: 1 cap(s) orally 3 times a day, As Needed (03 Mar 2020 06:19)  levothyroxine 50 mcg (0.05 mg) oral tablet: 1 tab(s) orally once a day (03 Mar 2020 06:19)  Lexapro 20 mg oral tablet: 1 tab(s) orally once a day (03 Mar 2020 06:19)  omeprazole 40 mg oral delayed release capsule: 1 cap(s) orally once a day (03 Mar 2020 06:19)    44y Female PMH of obesity BMI 49 s/p Gastric Bypass with Bilateral Tap block    24 Hours events: No acute events overnight, patient is ambulating, voiding, pain controlled. Tolerating deepika phase 1 diet, 1oz/hr.     NEURO:  Acute pain-controlled with Gabapentin, Tylenol, Toradol  Hx of Depression Lexapro 20, Clonazepam 0.5    RESP:   GRECIA on CPAP bedside- setting 4-> ramp to 11  on RA sat>95%  AM CXR      CARDS:   HTN not on medications  Normal EKG      GI/NUTR:   Diet, NPO except medications  GI Prophylaxis- PPI  Bowel regimen-  S/P Gastric bypass with Bilateral Tap block     /RENAL:   BUN 10 Creatinine 0.8  Freedman d/c'ed, voiding  Lytes-Na 140 // K 4.0 // Phos 3.3 //  Mag 2.0    HEME/ONC:   DVT prophylaxis- SCD, Hep SubQ  Hgb 10.7    ID:  Afebrile  WBC 12    ENDO:  HA1C 5.7  Hx of Hypothyroid on levothyroxine 50 mcg    LINES/DRAINS:  PIV  DISPO:    SICU    ***Tubes/Lines/Drains  ***  Peripheral IV	     [X] A ten-point review of systems was otherwise negative except as noted above.  [  ] Due to altered mental status/intubation, subjective information was not attained from the patient. History was obtained, to the extent possible, from review of the chart and collateral sources of information.    Daily     Daily     Diet, Clear Liquid:   Bariatric Clear Liquid (BARICLLIQ) (03-03-20 @ 17:12)      CURRENT MEDS:  Neurologic Medications  acetaminophen  IVPB .. 1000 milliGRAM(s) IV Intermittent once  gabapentin   Solution 300 milliGRAM(s) Oral every 8 hours  ketorolac   Injectable 15 milliGRAM(s) IV Push every 6 hours  ondansetron Injectable 4 milliGRAM(s) IV Push every 6 hours PRN Nausea    Respiratory Medications    Cardiovascular Medications    Gastrointestinal Medications  lactated ringers. 1000 milliLiter(s) IV Continuous <Continuous>  pantoprazole  Injectable 40 milliGRAM(s) IV Push daily    Genitourinary Medications    Hematologic/Oncologic Medications  heparin  Injectable 5000 Unit(s) SubCutaneous every 8 hours  heparin  Injectable 5000 Unit(s) SubCutaneous once    Antimicrobial/Immunologic Medications  cefoTEtan  IVPB 2 Gram(s) IV Intermittent every 12 hours    Endocrine/Metabolic Medications    Topical/Other Medications  BUpivacaine liposome 1.3% Injectable 20 milliLiter(s) Local Injection once      ICU Vital Signs Last 24 Hrs  T(C): 36.9 (04 Mar 2020 05:00), Max: 36.9 (04 Mar 2020 05:00)  T(F): 98.5 (04 Mar 2020 05:00), Max: 98.5 (04 Mar 2020 05:00)  HR: 90 (04 Mar 2020 06:00) (77 - 99)  BP: 132/71 (04 Mar 2020 06:00) (102/54 - 153/75)  BP(mean): --  ABP: --  ABP(mean): --  RR: 19 (04 Mar 2020 06:00) (10 - 33)  SpO2: 98% (04 Mar 2020 06:00) (91% - 99%)      Adult Advanced Hemodynamics Last 24 Hrs  CVP(mm Hg): --  CVP(cm H2O): --  CO: --  CI: --  PA: --  PA(mean): --  PCWP: --  SVR: --  SVRI: --  PVR: --  PVRI: --          I&O's Summary    03 Mar 2020 07:01  -  04 Mar 2020 07:00  --------------------------------------------------------  IN: 2680 mL / OUT: 640 mL / NET: 2040 mL      I&O's Detail    03 Mar 2020 07:01  -  04 Mar 2020 07:00  --------------------------------------------------------  IN:    IV PiggyBack: 100 mL    lactated ringers.: 125 mL    lactated ringers.: 2125 mL    Oral Fluid: 330 mL  Total IN: 2680 mL    OUT:    Indwelling Catheter - Urethral: 640 mL  Total OUT: 640 mL    Total NET: 2040 mL    PHYSICAL EXAM:    General/Neuro  alert & oriented x 3, no focal deficits    Lungs:      clear to auscultation, Normal expansion/effort.     Cardiovascular : S1, S2.  Regular rate and rhythm.  Peripheral edema   Cardiac Rhythm: Normal Sinus Rhythm    GI: Abdomen soft, Non-tender, Non-distended.      Extremities: Extremities warm, pink, well-perfused. Pulses:Rt     Lt    Derm: Good skin turgor, no skin breakdown.      :       Freedman catheter in place.        LABS:  CAPILLARY BLOOD GLUCOSE                              10.7   12.10 )-----------( 254      ( 04 Mar 2020 00:00 )             35.1       03-04    140  |  104  |  10  ----------------------------<  126<H>  4.0   |  24  |  0.8    Ca    8.5      04 Mar 2020 00:00  Phos  3.3     03-04  Mg     2.0     03-04

## 2020-03-04 NOTE — DISCHARGE NOTE PROVIDER - HOSPITAL COURSE
44F w/PMHx of obesity (BMI 49) admitted after elective gastric bypass with bilateral Tap block. There were no intraoperative complications, patient was extubated immediately following the procedure. She is currently tolerating a bariatric clear diet without nausea or vomiting, her pain is well-controlled, and she is voiding spontaneously. She is ambulating and is stable for discharge home with the diet and medications discussed prior to surgery and will follow-up in Dr. Portillo's office at her previously scheduled appointment time.

## 2020-03-04 NOTE — DISCHARGE NOTE NURSING/CASE MANAGEMENT/SOCIAL WORK - PATIENT PORTAL LINK FT
You can access the FollowMyHealth Patient Portal offered by [facility] by registering at the following website: [email or web address]. By joining [employer]’s FollowMyHealth portal, you will also be able to view your health information using other applications (apps) compatible with our system.

## 2020-03-04 NOTE — DISCHARGE NOTE PROVIDER - NSDCMRMEDTOKEN_GEN_ALL_CORE_FT
clonazePAM 0.5 mg oral tablet: 1 tab(s) orally 3 times a day  gabapentin 300 mg oral capsule: 1 cap(s) orally 3 times a day, As Needed  levothyroxine 50 mcg (0.05 mg) oral tablet: 1 tab(s) orally once a day  Lexapro 20 mg oral tablet: 1 tab(s) orally once a day  omeprazole 40 mg oral delayed release capsule: 1 cap(s) orally once a day

## 2020-03-04 NOTE — PROGRESS NOTE ADULT - SUBJECTIVE AND OBJECTIVE BOX
GENERAL SURGERY PROGRESS NOTE     KEBEDE, MAHI  44y  Female  Hospital day: 2  POD: 1  Procedure: Omental biopsy  Laparoscopic gastric bypass    OVERNIGHT EVENTS: Gabapentin increased yesterday for left arm numbness and tingling, tolerating 1oz/hr bariatric clears, no nausea or vomiting     T(F): 98.5 (03-04-20 @ 05:00), Max: 98.5 (03-04-20 @ 05:00)  HR: 90 (03-04-20 @ 06:00) (77 - 99)  BP: 132/71 (03-04-20 @ 06:00) (102/54 - 153/75)  RR: 19 (03-04-20 @ 06:00) (10 - 33)  SpO2: 98% (03-04-20 @ 06:00) (91% - 99%)    DIET/FLUIDS: lactated ringers. 1000 milliLiter(s) IV Continuous <Continuous>    URINE:   03-03-20 @ 07:01  -  03-04-20 @ 07:00  --------------------------------------------------------  OUT: 640 mL     GI proph:  pantoprazole  Injectable 40 milliGRAM(s) IV Push daily    AC/ proph: heparin  Injectable 5000 Unit(s) SubCutaneous every 8 hours  heparin  Injectable 5000 Unit(s) SubCutaneous once    ABx: cefoTEtan  IVPB 2 Gram(s) IV Intermittent every 12 hours    PHYSICAL EXAM:  GENERAL: NAD, well-appearing  CHEST/LUNG: Clear to auscultation bilaterally  HEART: Regular rate and rhythm  ABDOMEN: Soft, Nontender, Nondistended;   EXTREMITIES:  No clubbing, cyanosis, or edema    LABS               10.7   12.10 )-----------( 254      ( 04 Mar 2020 00:00 )             35.1         03-04    140  |  104  |  10  ----------------------------<  126<H>  4.0   |  24  |  0.8      eGFR if Non African American: 90 mL/min/1.73M2 (03-04-20 @ 00:00)    RADIOLOGY & ADDITIONAL TESTS:  *no new imaging GENERAL SURGERY PROGRESS NOTE     KEBEDE, MAHI  44y  Female  Hospital day: 2  POD: 1  Procedure: Omental biopsy  Laparoscopic gastric bypass    OVERNIGHT EVENTS: Gabapentin increased yesterday for left arm numbness and tingling, no signs of cellulitis or infection of the LUE, tolerating 1oz/hr bariatric clears, no nausea or vomiting    T(F): 98.5 (03-04-20 @ 05:00), Max: 98.5 (03-04-20 @ 05:00)  HR: 90 (03-04-20 @ 06:00) (77 - 99)  BP: 132/71 (03-04-20 @ 06:00) (102/54 - 153/75)  RR: 19 (03-04-20 @ 06:00) (10 - 33)  SpO2: 98% (03-04-20 @ 06:00) (91% - 99%)    DIET/FLUIDS: lactated ringers. 1000 milliLiter(s) IV Continuous <Continuous>    URINE:   03-03-20 @ 07:01  -  03-04-20 @ 07:00  --------------------------------------------------------  OUT: 640 mL     GI proph:  pantoprazole  Injectable 40 milliGRAM(s) IV Push daily    AC/ proph: heparin  Injectable 5000 Unit(s) SubCutaneous every 8 hours  heparin  Injectable 5000 Unit(s) SubCutaneous once    ABx: cefoTEtan  IVPB 2 Gram(s) IV Intermittent every 12 hours    PHYSICAL EXAM:  GENERAL: NAD, well-appearing  CHEST/LUNG: Clear to auscultation bilaterally  HEART: Regular rate and rhythm  ABDOMEN: Soft, Nontender, Nondistended;   EXTREMITIES: Numbness and tingling of the LUE, no evidence of cellulitis or induration, ROM in tact, mild swelling in the LUE, remainder of extremity exam WNL   LABS               10.7   12.10 )-----------( 254      ( 04 Mar 2020 00:00 )             35.1         03-04    140  |  104  |  10  ----------------------------<  126<H>  4.0   |  24  |  0.8      eGFR if Non African American: 90 mL/min/1.73M2 (03-04-20 @ 00:00)    RADIOLOGY & ADDITIONAL TESTS:  *no new imaging

## 2020-03-04 NOTE — PROGRESS NOTE ADULT - ATTENDING COMMENTS
stable overnight   start Kermit Diet   OOB and ambulate   d/c Freedman   Transfer to floor.
Patient seen and examined.  Clinically stable. No current complaints.  Started on diet.  Will progress as per protocol.

## 2020-03-04 NOTE — CHART NOTE - NSCHARTNOTEFT_GEN_A_CORE
43 YO F s/p lap RYGB - POD #1.  Tolerating deepika clear liquid diet well at 3 oz/hr.  Has protein shakes and chewable vits/mins at home.  Patient verbalized understanding of phase I diet to begin upon discharge.  DROP sheet reviewed with patient - all questions answered.  Will follow up in office next week.  Call [phone] with questions/concerns.

## 2020-03-04 NOTE — DISCHARGE NOTE PROVIDER - CARE PROVIDER_API CALL
Kim Portillo)  Surgery  [address] [place] [address], [address] [place] [address]  Phone: [phone]  Fax: [phone]  Follow Up Time:

## 2020-03-04 NOTE — DISCHARGE NOTE PROVIDER - NSDCFUADDINST_GEN_ALL_CORE_FT
You are being discharged from [facility]. You have been scheduled a follow-up appointment in Dr. Portillo's office. Please adhere to the Bariatric diet discussed prior to surgery. Please take all medications prescribed to you. Please avoid heavy weight lifting for the next 4-6 weeks. If you have any further questions about your care, please do not hesitate to contact Dr. Portillo's office.

## 2020-03-04 NOTE — CHART NOTE - NSCHARTNOTEFT_GEN_A_CORE
SICU PA Transfer note:    45 Y/O FEMALE PMH of thalassemia Minor, hypothyroidism, HTN, GRECIA on CPAP Anxiety, Depression PRESENTS TO PAST WITH HX MORBID OBESITY BMI 49 5'7" 375 lbs s/p Gastric Bypass with Bilateral Tap Block    Procedure:  OR Stats  OR Time:  150 min              EBL 5:          IV Fluids: 1800      Blood Products:                UOP:    100  Findings- Dense omental adhesions, shortened transverse mesocolon, splenosis    Plan:    NEURO:  Acute pain-controlled with Gabapentin, Tylenol IV x 1, Toradol 15mg q6h x 4doses  Hx of Depression Lexapro 20, Clonazepam 0.5 @ home    RESP:   GRECIA on CPAP bedside- setting 4-> ramp to 11  on RA sat>95%      CARDS:   HTN not on medications  Normal EKG      GI/NUTR:   Diet: phase 1 (tolerating 1oz, started on 2oz)  GI Prophylaxis- PPI  Bowel regimen-  S/P Gastric bypass with Bilateral Tap block     /RENAL:   BUN 10 Creatinine 0.8  Freedman d/c'ed, voiding  Lytes-Na 140 // K 4.0 // Phos 3.3 //  Mag 2.0    HEME/ONC:   DVT prophylaxis- SCD, Hep SubQ  Hgb 10.7    ID:  Afebrile  WBC 12    ENDO:  HA1C 5.7  Hx of Hypothyroid on levothyroxine 50 mcg PO @ home (for now on IV 25mcg until tolerates PO)    Dispo: downgrade to floor   Signout given to _________ SICU PA Transfer note:    43 Y/O FEMALE PMH of thalassemia Minor, hypothyroidism, HTN, GRECIA on CPAP Anxiety, Depression PRESENTS TO PAST WITH HX MORBID OBESITY BMI 49 5'7" 375 lbs s/p Gastric Bypass with Bilateral Tap Block    Procedure:  OR Stats  OR Time:  150 min              EBL 5:          IV Fluids: 1800      Blood Products:                UOP:    100  Findings- Dense omental adhesions, shortened transverse mesocolon, splenosis    Plan:    NEURO:  Acute pain-controlled with Gabapentin, Tylenol IV x 1, Toradol 15mg q6h x 4doses  Hx of Depression Lexapro 20, Clonazepam 0.5 @ home    RESP:   GRECIA on CPAP bedside- setting 4-> ramp to 11  on RA sat>95%      CARDS:   HTN not on medications  Normal EKG      GI/NUTR:   Diet: phase 1 (tolerating 1oz, started on 2oz)  GI Prophylaxis- PPI  S/P Gastric bypass with Bilateral Tap block     /RENAL:   BUN 10 Creatinine 0.8  Freedman d/c'ed, voiding  Lytes-Na 140 // K 4.0 // Phos 3.3 //  Mag 2.0    HEME/ONC:   DVT prophylaxis- SCD, Hep SubQ  Hgb 10.7    ID:  Afebrile  WBC 12    ENDO:  HA1C 5.7  Hx of Hypothyroid on levothyroxine 50 mcg PO @ home (for now on IV 25mcg until tolerates PO)    Dispo: hemodynamically stable to downgrade to floor   Signout given to VIOLET Kulkarni @ 11:05am on 3/4/20

## 2020-03-08 ENCOUNTER — EMERGENCY (EMERGENCY)
Facility: HOSPITAL | Age: 45
LOS: 0 days | Discharge: HOME | End: 2020-03-09
Attending: EMERGENCY MEDICINE | Admitting: EMERGENCY MEDICINE
Payer: COMMERCIAL

## 2020-03-08 VITALS
SYSTOLIC BLOOD PRESSURE: 137 MMHG | RESPIRATION RATE: 18 BRPM | WEIGHT: 293 LBS | HEIGHT: 67 IN | OXYGEN SATURATION: 98 % | DIASTOLIC BLOOD PRESSURE: 73 MMHG | HEART RATE: 96 BPM | TEMPERATURE: 97 F

## 2020-03-08 DIAGNOSIS — Z79.2 LONG TERM (CURRENT) USE OF ANTIBIOTICS: ICD-10-CM

## 2020-03-08 DIAGNOSIS — Z90.89 ACQUIRED ABSENCE OF OTHER ORGANS: Chronic | ICD-10-CM

## 2020-03-08 DIAGNOSIS — I10 ESSENTIAL (PRIMARY) HYPERTENSION: ICD-10-CM

## 2020-03-08 DIAGNOSIS — Z98.890 OTHER SPECIFIED POSTPROCEDURAL STATES: Chronic | ICD-10-CM

## 2020-03-08 DIAGNOSIS — E03.9 HYPOTHYROIDISM, UNSPECIFIED: ICD-10-CM

## 2020-03-08 DIAGNOSIS — E78.5 HYPERLIPIDEMIA, UNSPECIFIED: ICD-10-CM

## 2020-03-08 DIAGNOSIS — R21 RASH AND OTHER NONSPECIFIC SKIN ERUPTION: ICD-10-CM

## 2020-03-08 DIAGNOSIS — L03.114 CELLULITIS OF LEFT UPPER LIMB: ICD-10-CM

## 2020-03-08 DIAGNOSIS — Z98.51 TUBAL LIGATION STATUS: Chronic | ICD-10-CM

## 2020-03-08 DIAGNOSIS — L53.9 ERYTHEMATOUS CONDITION, UNSPECIFIED: ICD-10-CM

## 2020-03-08 DIAGNOSIS — M79.603 PAIN IN ARM, UNSPECIFIED: ICD-10-CM

## 2020-03-08 PROCEDURE — 99283 EMERGENCY DEPT VISIT LOW MDM: CPT

## 2020-03-08 NOTE — ED ADULT NURSE NOTE - OBJECTIVE STATEMENT
pt states had bypass surgery on tuesday and site where iv was placed is red/hot and painful to the touch - also right abdominal incision was blistered and popped last night and is tender and red .

## 2020-03-08 NOTE — ED ADULT TRIAGE NOTE - CHIEF COMPLAINT QUOTE
Pt states "I had gastric bypass surgery on Tuesday and my left arm hurts from the IV". Pt noted w/ R arm swelling to elbow. Pt also c/o redness to surgical site. + pulses noted, warm to touch. Pt FARHAN

## 2020-03-09 ENCOUNTER — APPOINTMENT (OUTPATIENT)
Dept: SURGERY | Facility: CLINIC | Age: 45
End: 2020-03-09
Payer: COMMERCIAL

## 2020-03-09 ENCOUNTER — INPATIENT (INPATIENT)
Facility: HOSPITAL | Age: 45
LOS: 2 days | Discharge: HOME | End: 2020-03-12
Attending: SURGERY | Admitting: SURGERY
Payer: COMMERCIAL

## 2020-03-09 VITALS
TEMPERATURE: 98 F | HEIGHT: 67 IN | RESPIRATION RATE: 18 BRPM | OXYGEN SATURATION: 99 % | HEART RATE: 111 BPM | SYSTOLIC BLOOD PRESSURE: 153 MMHG | DIASTOLIC BLOOD PRESSURE: 84 MMHG

## 2020-03-09 VITALS — HEIGHT: 67 IN | BODY MASS INDEX: 45.99 KG/M2 | TEMPERATURE: 98.6 F | WEIGHT: 293 LBS

## 2020-03-09 DIAGNOSIS — D73.89 OTHER DISEASES OF SPLEEN: ICD-10-CM

## 2020-03-09 DIAGNOSIS — G47.33 OBSTRUCTIVE SLEEP APNEA (ADULT) (PEDIATRIC): ICD-10-CM

## 2020-03-09 DIAGNOSIS — Z98.890 OTHER SPECIFIED POSTPROCEDURAL STATES: Chronic | ICD-10-CM

## 2020-03-09 DIAGNOSIS — M54.5 LOW BACK PAIN: ICD-10-CM

## 2020-03-09 DIAGNOSIS — Z90.89 ACQUIRED ABSENCE OF OTHER ORGANS: Chronic | ICD-10-CM

## 2020-03-09 DIAGNOSIS — L27.0 GENERALIZED SKIN ERUPTION DUE TO DRUGS AND MEDICAMENTS TAKEN INTERNALLY: ICD-10-CM

## 2020-03-09 DIAGNOSIS — I80.8 PHLEBITIS AND THROMBOPHLEBITIS OF OTHER SITES: ICD-10-CM

## 2020-03-09 DIAGNOSIS — D56.3 THALASSEMIA MINOR: ICD-10-CM

## 2020-03-09 DIAGNOSIS — E66.01 MORBID (SEVERE) OBESITY DUE TO EXCESS CALORIES: ICD-10-CM

## 2020-03-09 DIAGNOSIS — T36.1X5A ADVERSE EFFECT OF CEPHALOSPORINS AND OTHER BETA-LACTAM ANTIBIOTICS, INITIAL ENCOUNTER: ICD-10-CM

## 2020-03-09 DIAGNOSIS — F32.9 MAJOR DEPRESSIVE DISORDER, SINGLE EPISODE, UNSPECIFIED: ICD-10-CM

## 2020-03-09 DIAGNOSIS — E03.9 HYPOTHYROIDISM, UNSPECIFIED: ICD-10-CM

## 2020-03-09 DIAGNOSIS — L03.114 CELLULITIS OF LEFT UPPER LIMB: ICD-10-CM

## 2020-03-09 DIAGNOSIS — F41.9 ANXIETY DISORDER, UNSPECIFIED: ICD-10-CM

## 2020-03-09 DIAGNOSIS — E78.5 HYPERLIPIDEMIA, UNSPECIFIED: ICD-10-CM

## 2020-03-09 DIAGNOSIS — M19.90 UNSPECIFIED OSTEOARTHRITIS, UNSPECIFIED SITE: ICD-10-CM

## 2020-03-09 DIAGNOSIS — Z99.89 DEPENDENCE ON OTHER ENABLING MACHINES AND DEVICES: ICD-10-CM

## 2020-03-09 DIAGNOSIS — I10 ESSENTIAL (PRIMARY) HYPERTENSION: ICD-10-CM

## 2020-03-09 DIAGNOSIS — Z98.51 TUBAL LIGATION STATUS: Chronic | ICD-10-CM

## 2020-03-09 LAB
ALBUMIN SERPL ELPH-MCNC: 4.3 G/DL — SIGNIFICANT CHANGE UP (ref 3.5–5.2)
ALP SERPL-CCNC: 51 U/L — SIGNIFICANT CHANGE UP (ref 30–115)
ALT FLD-CCNC: 18 U/L — SIGNIFICANT CHANGE UP (ref 0–41)
ANION GAP SERPL CALC-SCNC: 13 MMOL/L — SIGNIFICANT CHANGE UP (ref 7–14)
AST SERPL-CCNC: 23 U/L — SIGNIFICANT CHANGE UP (ref 0–41)
BASOPHILS # BLD AUTO: 0.03 K/UL — SIGNIFICANT CHANGE UP (ref 0–0.2)
BASOPHILS NFR BLD AUTO: 0.4 % — SIGNIFICANT CHANGE UP (ref 0–1)
BILIRUB SERPL-MCNC: 0.5 MG/DL — SIGNIFICANT CHANGE UP (ref 0.2–1.2)
BUN SERPL-MCNC: 10 MG/DL — SIGNIFICANT CHANGE UP (ref 10–20)
CALCIUM SERPL-MCNC: 9.3 MG/DL — SIGNIFICANT CHANGE UP (ref 8.5–10.1)
CHLORIDE SERPL-SCNC: 99 MMOL/L — SIGNIFICANT CHANGE UP (ref 98–110)
CO2 SERPL-SCNC: 24 MMOL/L — SIGNIFICANT CHANGE UP (ref 17–32)
CREAT SERPL-MCNC: 0.7 MG/DL — SIGNIFICANT CHANGE UP (ref 0.7–1.5)
EOSINOPHIL # BLD AUTO: 0.29 K/UL — SIGNIFICANT CHANGE UP (ref 0–0.7)
EOSINOPHIL NFR BLD AUTO: 3.9 % — SIGNIFICANT CHANGE UP (ref 0–8)
GLUCOSE SERPL-MCNC: 103 MG/DL — HIGH (ref 70–99)
HCG SERPL QL: NEGATIVE — SIGNIFICANT CHANGE UP
HCT VFR BLD CALC: 37.7 % — SIGNIFICANT CHANGE UP (ref 37–47)
HGB BLD-MCNC: 11.5 G/DL — LOW (ref 12–16)
IMM GRANULOCYTES NFR BLD AUTO: 0.3 % — SIGNIFICANT CHANGE UP (ref 0.1–0.3)
LACTATE SERPL-SCNC: 0.8 MMOL/L — SIGNIFICANT CHANGE UP (ref 0.7–2)
LYMPHOCYTES # BLD AUTO: 1.05 K/UL — LOW (ref 1.2–3.4)
LYMPHOCYTES # BLD AUTO: 14.1 % — LOW (ref 20.5–51.1)
MCHC RBC-ENTMCNC: 18.3 PG — LOW (ref 27–31)
MCHC RBC-ENTMCNC: 30.5 G/DL — LOW (ref 32–37)
MCV RBC AUTO: 59.9 FL — LOW (ref 81–99)
MONOCYTES # BLD AUTO: 0.47 K/UL — SIGNIFICANT CHANGE UP (ref 0.1–0.6)
MONOCYTES NFR BLD AUTO: 6.3 % — SIGNIFICANT CHANGE UP (ref 1.7–9.3)
NEUTROPHILS # BLD AUTO: 5.57 K/UL — SIGNIFICANT CHANGE UP (ref 1.4–6.5)
NEUTROPHILS NFR BLD AUTO: 75 % — SIGNIFICANT CHANGE UP (ref 42.2–75.2)
NRBC # BLD: 0 /100 WBCS — SIGNIFICANT CHANGE UP (ref 0–0)
PLATELET # BLD AUTO: 241 K/UL — SIGNIFICANT CHANGE UP (ref 130–400)
POTASSIUM SERPL-MCNC: 4.1 MMOL/L — SIGNIFICANT CHANGE UP (ref 3.5–5)
POTASSIUM SERPL-SCNC: 4.1 MMOL/L — SIGNIFICANT CHANGE UP (ref 3.5–5)
PROT SERPL-MCNC: 6.8 G/DL — SIGNIFICANT CHANGE UP (ref 6–8)
RBC # BLD: 6.29 M/UL — HIGH (ref 4.2–5.4)
RBC # FLD: 20.9 % — HIGH (ref 11.5–14.5)
SODIUM SERPL-SCNC: 136 MMOL/L — SIGNIFICANT CHANGE UP (ref 135–146)
WBC # BLD: 7.43 K/UL — SIGNIFICANT CHANGE UP (ref 4.8–10.8)
WBC # FLD AUTO: 7.43 K/UL — SIGNIFICANT CHANGE UP (ref 4.8–10.8)

## 2020-03-09 PROCEDURE — 99024 POSTOP FOLLOW-UP VISIT: CPT

## 2020-03-09 PROCEDURE — 93971 EXTREMITY STUDY: CPT | Mod: 26,LT

## 2020-03-09 PROCEDURE — 99285 EMERGENCY DEPT VISIT HI MDM: CPT | Mod: 25

## 2020-03-09 RX ORDER — MORPHINE SULFATE 50 MG/1
4 CAPSULE, EXTENDED RELEASE ORAL ONCE
Refills: 0 | Status: DISCONTINUED | OUTPATIENT
Start: 2020-03-09 | End: 2020-03-09

## 2020-03-09 RX ORDER — SODIUM CHLORIDE 9 MG/ML
1000 INJECTION, SOLUTION INTRAVENOUS ONCE
Refills: 0 | Status: COMPLETED | OUTPATIENT
Start: 2020-03-09 | End: 2020-03-09

## 2020-03-09 RX ORDER — CEPHALEXIN 500 MG
10 CAPSULE ORAL
Qty: 280 | Refills: 0
Start: 2020-03-09 | End: 2020-03-15

## 2020-03-09 RX ORDER — GABAPENTIN 250 MG/5ML
250 SOLUTION ORAL EVERY 8 HOURS
Qty: 15 | Refills: 0 | Status: DISCONTINUED | COMMUNITY
Start: 2020-02-19 | End: 2020-03-09

## 2020-03-09 RX ADMIN — MORPHINE SULFATE 4 MILLIGRAM(S): 50 CAPSULE, EXTENDED RELEASE ORAL at 23:32

## 2020-03-09 RX ADMIN — Medication 100 MILLIGRAM(S): at 23:17

## 2020-03-09 RX ADMIN — SODIUM CHLORIDE 1000 MILLILITER(S): 9 INJECTION, SOLUTION INTRAVENOUS at 22:49

## 2020-03-09 NOTE — ED PROVIDER NOTE - CLINICAL SUMMARY MEDICAL DECISION MAKING FREE TEXT BOX
L hand/forearm cellulitis 2/2 recent IV placement during admission gastric bypass, worsening despite 3 doses augmentin - wbc nl, duplex showing cephalic vein thrombophlebitis, iv clinda given - surg team consulted, pt placed in EDOU under Bariatric protocol per Dr. Price's request

## 2020-03-09 NOTE — HISTORY OF PRESENT ILLNESS
[Phase 1] : Phase 1 [Walking] : walking [Procedure: ___] : Procedure performed: [unfilled]  [Date of Surgery: ___] : Date of Surgery:   [unfilled] [Pre-Op Weight ___] : Pre-op weight was [unfilled] lbs [___ Days Post Op] : [unfilled] days  [de-identified] : c/o left wrist to forearm pain associated with swelling and redness. Patient went to the ER on 3/8/2020 and was diagnosed with left forearm cellulitis.\par No nausea/reflux. She had one episode of vomiting after taking liquid antibiotics that was administered in the ER.

## 2020-03-09 NOTE — PHYSICAL EXAM
[de-identified] : Soft, nondistended [de-identified] : Incisions dry and clean with no evidence of erythema.

## 2020-03-09 NOTE — ED PROVIDER NOTE - NS ED ROS FT
Constitutional: No fever, chills, unintended weight loss.  Eyes:  No visual changes, eye pain or discharge.  ENMT:  No hearing changes, pain, no sore throat or runny nose, no difficulty swallowing  Cardiac:  No chest pain, SOB or edema. No chest pain with exertion.  Respiratory:  No cough or respiratory distress. No hemoptysis. No history of asthma or RAD.  GI:  No nausea, vomiting, diarrhea or abdominal pain.  :  No dysuria, frequency or burning.  MS:  No myalgia, muscle weakness, joint pain or back pain.  Neuro:  No headache or weakness.  No LOC.  Skin:  see hpi  Endocrine: No history of thyroid disease +diabetes.

## 2020-03-09 NOTE — ED PROVIDER NOTE - PHYSICAL EXAMINATION
PE:  nad  skin see ext exam; remainder warm, dry  ncat  neck supple  tachy 100s reg rhythm l s1s2 no mrg  ctab no wrr  abd- surgical incision sites healing well c/d/i no signs of inf, abd soft ntnd no palpable masses no rgr  back non-tender no cvat  ext LUE- +erythema/edema of dorsal hand extending up to prox elbow, +ttp, dpi, NVI throughout; remainder of ext no cce dpi  neuro aaox3 grossly nf exam

## 2020-03-09 NOTE — ED PROVIDER NOTE - OBJECTIVE STATEMENT
45yo F history of HTN DL DM GRECIA CPAP with recent gastric bypass 3/3 presenting with LUE pain. Per pt, had an IV placed in the L dorsal hand during her stay, initially felt pain, burning, since discharge noted worsening pain/redness now tracking up arm. No fevers/chills. +baseline neuropathy, otherwise no numbness/focal weakness. Also noted small blisters that developed around an incision site that subsequently scabbed over.

## 2020-03-09 NOTE — ED PROVIDER NOTE - PHYSICAL EXAMINATION
Constitutional: Well appearing. No acute distress. Non toxic.   Eyes: PERRLA. Extraocular movements intact, no entrapment. Conjunctiva normal.   ENT: No nasal discharge. Moist mucus membranes.  Neck: Supple, non tender, full range of motion.  Abd: incision sites c/d/i, small scabbed erythematous markings around 1 site, non tender, non vesicular.   Ext: Warm and well perfused x4, moving all extremities, no edema. 2+ equal pulses throughout. <2sec capillary refill throughout. +L dorsal hand erythema, warmth, tenderness with small streak extending up forearm. No crepitus, no induration. No fluctuance.   Psy: Cooperative, appropriate.   Skin: Warm, dry, no rash  Neuro: CN2-12 grossly intact no sensory or motor deficits throughout, no drift

## 2020-03-09 NOTE — ED ADULT TRIAGE NOTE - CHIEF COMPLAINT QUOTE
c/o left arm swelling and redness, pt seen in ED last night sent home with ABT, redness and swelling increased since yesterday. Denies fevers.

## 2020-03-09 NOTE — ED PROVIDER NOTE - OBJECTIVE STATEMENT
44y f h/o dm, htn, hl, chichi on cpap, np, s/p route-en-y gastric bypass by Dr. Portillo 3/3 p/w worsening L hand/forearm cellulitis. Pt dvlpd erythema to L dorsal hand around IV site few days after discharge from hosp 3/4, seen in ED overnight for same and discharged w/Rx for liquid keflex & doxy. Her pharmacy was out of both, Rx was changed to augmentin only which she took 3 doses of so far w/worsening. Saw Dr. Portillo for f/u in office today, advised to call svc if sx worsened which she did this evening and was advised by Dr. Price to come to ED for surgical team eval. Pt denies f/c, cp/sob/cullen, cough, hemoptysis, nvd, abd pain, flank pain. Surgical sites healing well. PMD Shelby.

## 2020-03-09 NOTE — ASSESSMENT
[___ Days Post Op] : [unfilled] days [Today's Weight: ___] : Today's weight:[unfilled] lbs [Today's BMI: ___] : Today's BMI: [unfilled] [de-identified] : MAHI KEBEDE is a 44 year female seen today s/p gastric bypass surgery 6 days ago with c/o left arm pain, redness and swelling.\par Patient states that she is compliant with dietary guidelines and is tolerating 1 1/2 protein shakes daily. \par Fluid intake is adequate with mainly water.\par She is walking daily for 15 minutes.  Reinforced no heavy lifting bending and pulling.\par Plan: Prescription for Augmentin every 8 hours for 7 days. Apply warm compresses to left arm for 20-30 minutes and elevate above the heart. Left arm marking done and patient is aware to call if swelling and redness extends above the area. RTO on 3/11 for postop visit. \par \par

## 2020-03-09 NOTE — DATA REVIEWED
[FreeTextEntry1] : Specimen(s) Submitted\par Reactive omentum fat: Reactive omental fat, omental biopsy:\par - Adipose tissue and focal fibroconnective tissue, showing\par congestion and focal fresh hemorrhage

## 2020-03-09 NOTE — ED PROVIDER NOTE - CLINICAL SUMMARY MEDICAL DECISION MAKING FREE TEXT BOX
43yo F history of HTN DL DM GRECIA CPAP with recent gastric bypass 3/3 presenting with LUE pain. Per pt, had an IV placed in the L dorsal hand during her stay, initially felt pain, burning, since discharge noted worsening pain/redness now tracking up arm. No fevers/chills. +baseline neuropathy, otherwise no numbness/focal weakness. Also noted small blisters that developed around an incision site that subsequently scabbed over. Comfortable with discharge and follow-up outpatient, strict return precautions given. Endorses understanding of all of this and aware that they can return at any time for new or concerning symptoms. No further questions or concerns at this time

## 2020-03-09 NOTE — ED PROVIDER NOTE - PATIENT PORTAL LINK FT
You can access the FollowMyHealth Patient Portal offered by [facility] by registering at the following website: [email or web address]. By joining [employer]’s FollowMyHealth portal, you will also be able to view your health information using other applications (apps) compatible with our system.

## 2020-03-10 PROCEDURE — 99236 HOSP IP/OBS SAME DATE HI 85: CPT

## 2020-03-10 PROCEDURE — 93010 ELECTROCARDIOGRAM REPORT: CPT

## 2020-03-10 PROCEDURE — 99221 1ST HOSP IP/OBS SF/LOW 40: CPT | Mod: GC,24

## 2020-03-10 PROCEDURE — 99222 1ST HOSP IP/OBS MODERATE 55: CPT | Mod: AI

## 2020-03-10 RX ORDER — MORPHINE SULFATE 50 MG/1
4 CAPSULE, EXTENDED RELEASE ORAL ONCE
Refills: 0 | Status: DISCONTINUED | OUTPATIENT
Start: 2020-03-10 | End: 2020-03-10

## 2020-03-10 RX ORDER — PANTOPRAZOLE SODIUM 20 MG/1
40 TABLET, DELAYED RELEASE ORAL ONCE
Refills: 0 | Status: COMPLETED | OUTPATIENT
Start: 2020-03-10 | End: 2020-03-10

## 2020-03-10 RX ORDER — PANTOPRAZOLE SODIUM 20 MG/1
1 TABLET, DELAYED RELEASE ORAL
Qty: 0 | Refills: 0 | DISCHARGE

## 2020-03-10 RX ORDER — CEFAZOLIN SODIUM 1 G
2000 VIAL (EA) INJECTION ONCE
Refills: 0 | Status: COMPLETED | OUTPATIENT
Start: 2020-03-10 | End: 2020-03-10

## 2020-03-10 RX ORDER — MORPHINE SULFATE 50 MG/1
3 CAPSULE, EXTENDED RELEASE ORAL EVERY 4 HOURS
Refills: 0 | Status: DISCONTINUED | OUTPATIENT
Start: 2020-03-10 | End: 2020-03-11

## 2020-03-10 RX ORDER — CEFAZOLIN SODIUM 1 G
2000 VIAL (EA) INJECTION EVERY 8 HOURS
Refills: 0 | Status: DISCONTINUED | OUTPATIENT
Start: 2020-03-10 | End: 2020-03-11

## 2020-03-10 RX ORDER — VANCOMYCIN HCL 1 G
1500 VIAL (EA) INTRAVENOUS ONCE
Refills: 0 | Status: COMPLETED | OUTPATIENT
Start: 2020-03-10 | End: 2020-03-10

## 2020-03-10 RX ORDER — PANTOPRAZOLE SODIUM 20 MG/1
40 TABLET, DELAYED RELEASE ORAL
Refills: 0 | Status: DISCONTINUED | OUTPATIENT
Start: 2020-03-10 | End: 2020-03-12

## 2020-03-10 RX ORDER — PEDI MULTIVIT NO.25/FOLIC ACID 300 MCG
60 TABLET,CHEWABLE ORAL DAILY
Refills: 0 | Status: ACTIVE | COMMUNITY

## 2020-03-10 RX ORDER — CEPHALEXIN 250 MG/5ML
250 FOR SUSPENSION ORAL
Qty: 300 | Refills: 0 | Status: COMPLETED | COMMUNITY
Start: 2020-03-08

## 2020-03-10 RX ORDER — DIPHENHYDRAMINE HCL 50 MG
25 CAPSULE ORAL EVERY 4 HOURS
Refills: 0 | Status: DISCONTINUED | OUTPATIENT
Start: 2020-03-10 | End: 2020-03-12

## 2020-03-10 RX ORDER — INFLUENZA VIRUS VACCINE 15; 15; 15; 15 UG/.5ML; UG/.5ML; UG/.5ML; UG/.5ML
0.5 SUSPENSION INTRAMUSCULAR ONCE
Refills: 0 | Status: DISCONTINUED | OUTPATIENT
Start: 2020-03-10 | End: 2020-03-12

## 2020-03-10 RX ORDER — CEFAZOLIN SODIUM 1 G
VIAL (EA) INJECTION
Refills: 0 | Status: DISCONTINUED | OUTPATIENT
Start: 2020-03-10 | End: 2020-03-11

## 2020-03-10 RX ORDER — PROCHLORPERAZINE MALEATE 5 MG
10 TABLET ORAL ONCE
Refills: 0 | Status: DISCONTINUED | OUTPATIENT
Start: 2020-03-10 | End: 2020-03-10

## 2020-03-10 RX ORDER — DIPHENHYDRAMINE HCL 50 MG
50 CAPSULE ORAL ONCE
Refills: 0 | Status: COMPLETED | OUTPATIENT
Start: 2020-03-10 | End: 2020-03-10

## 2020-03-10 RX ORDER — VANCOMYCIN HCL 1 G
VIAL (EA) INTRAVENOUS
Refills: 0 | Status: DISCONTINUED | OUTPATIENT
Start: 2020-03-10 | End: 2020-03-10

## 2020-03-10 RX ORDER — ENOXAPARIN SODIUM 100 MG/ML
40 INJECTION SUBCUTANEOUS DAILY
Refills: 0 | Status: DISCONTINUED | OUTPATIENT
Start: 2020-03-10 | End: 2020-03-11

## 2020-03-10 RX ORDER — ESCITALOPRAM OXALATE 10 MG/1
20 TABLET, FILM COATED ORAL DAILY
Refills: 0 | Status: DISCONTINUED | OUTPATIENT
Start: 2020-03-10 | End: 2020-03-12

## 2020-03-10 RX ORDER — LEVOTHYROXINE SODIUM 125 MCG
50 TABLET ORAL DAILY
Refills: 0 | Status: DISCONTINUED | OUTPATIENT
Start: 2020-03-10 | End: 2020-03-12

## 2020-03-10 RX ORDER — PROCHLORPERAZINE MALEATE 5 MG
10 TABLET ORAL ONCE
Refills: 0 | Status: COMPLETED | OUTPATIENT
Start: 2020-03-10 | End: 2020-03-10

## 2020-03-10 RX ORDER — MORPHINE SULFATE 50 MG/1
2 CAPSULE, EXTENDED RELEASE ORAL EVERY 6 HOURS
Refills: 0 | Status: DISCONTINUED | OUTPATIENT
Start: 2020-03-10 | End: 2020-03-10

## 2020-03-10 RX ORDER — CALCIUM CARB/VITAMIN D3/VIT K1 650MG-12.5
TABLET,CHEWABLE ORAL DAILY
Refills: 0 | Status: ACTIVE | COMMUNITY

## 2020-03-10 RX ORDER — ONDANSETRON 8 MG/1
4 TABLET, FILM COATED ORAL ONCE
Refills: 0 | Status: COMPLETED | OUTPATIENT
Start: 2020-03-10 | End: 2020-03-10

## 2020-03-10 RX ADMIN — Medication 50 MILLIGRAM(S): at 11:34

## 2020-03-10 RX ADMIN — MORPHINE SULFATE 3 MILLIGRAM(S): 50 CAPSULE, EXTENDED RELEASE ORAL at 18:17

## 2020-03-10 RX ADMIN — PANTOPRAZOLE SODIUM 40 MILLIGRAM(S): 20 TABLET, DELAYED RELEASE ORAL at 10:50

## 2020-03-10 RX ADMIN — Medication 100 MILLIGRAM(S): at 22:26

## 2020-03-10 RX ADMIN — ONDANSETRON 4 MILLIGRAM(S): 8 TABLET, FILM COATED ORAL at 01:25

## 2020-03-10 RX ADMIN — MORPHINE SULFATE 4 MILLIGRAM(S): 50 CAPSULE, EXTENDED RELEASE ORAL at 01:25

## 2020-03-10 RX ADMIN — Medication 100 MILLIGRAM(S): at 08:36

## 2020-03-10 RX ADMIN — Medication 10 MILLIGRAM(S): at 19:35

## 2020-03-10 RX ADMIN — Medication 25 MILLIGRAM(S): at 18:17

## 2020-03-10 RX ADMIN — MORPHINE SULFATE 3 MILLIGRAM(S): 50 CAPSULE, EXTENDED RELEASE ORAL at 18:37

## 2020-03-10 RX ADMIN — MORPHINE SULFATE 4 MILLIGRAM(S): 50 CAPSULE, EXTENDED RELEASE ORAL at 01:40

## 2020-03-10 RX ADMIN — MORPHINE SULFATE 2 MILLIGRAM(S): 50 CAPSULE, EXTENDED RELEASE ORAL at 11:05

## 2020-03-10 RX ADMIN — MORPHINE SULFATE 4 MILLIGRAM(S): 50 CAPSULE, EXTENDED RELEASE ORAL at 01:38

## 2020-03-10 RX ADMIN — SODIUM CHLORIDE 1000 MILLILITER(S): 9 INJECTION, SOLUTION INTRAVENOUS at 01:38

## 2020-03-10 RX ADMIN — Medication 600 MILLIGRAM(S): at 09:10

## 2020-03-10 RX ADMIN — Medication 600 MILLIGRAM(S): at 01:38

## 2020-03-10 RX ADMIN — Medication 300 MILLIGRAM(S): at 15:18

## 2020-03-10 RX ADMIN — MORPHINE SULFATE 2 MILLIGRAM(S): 50 CAPSULE, EXTENDED RELEASE ORAL at 10:50

## 2020-03-10 RX ADMIN — ESCITALOPRAM OXALATE 20 MILLIGRAM(S): 10 TABLET, FILM COATED ORAL at 15:18

## 2020-03-10 NOTE — ED CDU PROVIDER INITIAL DAY NOTE - NS ED ROS FT
Review of Systems  Constitutional:  No fever, chills.  Eyes:  No visual changes, eye pain, or discharge.  ENMT:  No hearing changes, pain, or discharge. No nasal congestion, discharge, or bleeding. No throat pain, swelling, or difficulty swallowing.  Cardiac:  No chest pain, palpitations, syncope.  Respiratory:  No dyspnea, cough. No hemoptysis.  GI:  No nausea, vomiting, diarrhea, or abdominal pain.   :  No dysuria, hematuria, frequency, or burning.  MS:  No back pain.  Skin:  No skin rash, pruritis, jaundice, or lesions.  Neuro:  No headache, dizziness, loss of sensation, or focal weakness.  No change in mental status.

## 2020-03-10 NOTE — H&P ADULT - ATTENDING COMMENTS
A 43 yo female with PMH of Morbid obesity s/p gastric bypass 3/3/20, GRECIA on CPAP, hypothyroidism, Gastric ulcer, presents with worsening LUE erythema and edema/pain since the day of her procedure. The area was marked prior to DC from the hospital POD 1. She noticed spread of the erythema beyond the area that had been previously marked, with worsening pain in her wrist and forearm. She presented to ED on Sunday and was given doxycyclin and keflex however patient was unable to tolerate the antibiotics due to vomiting after recent surgery. Patient otherwise denies fevers, chills, sob, palpitations, purulence, drainage, paresthesia abd pain, dysuria, constipation, diarrhea. In ED patient was evaluated by surgery with low suspicion of compartment syndrome. She received clindamycin initially in ED. She states she subsequently developed R arm maculopapular rash which is pruritic improved with benadryl. Antibiotics then changed to vancomycin. Venous duplex showing superficial thrombophelebitis of L cephalic vein    PHYSICAL EXAM:  GENERAL: NAD, well-developed  HEAD:  Atraumatic, Normocephalic  EYES: EOMI, PERRLA, conjunctiva and sclera clear  NECK: Supple, No JVD  CHEST/LUNG: Clear to auscultation bilaterally; No wheeze  HEART: Regular rate and rhythm; No murmurs, rubs, or gallops  ABDOMEN: Soft, Nontender, Nondistended;  recent surgical wounds are clean  and closed.   EXTREMITIES:  2+ Peripheral Pulses, LUE erythema, tenderness and edema start from the hand follow the veins map on the arm.   PSYCH: AAOx3  NEUROLOGY: non-focal  SKIN: No rashes or lesions    A/P:   Acute left upper extremity thrombophlebitis:   Sepsis ruled out on admission  ID recommended Ancef IV and warm compress.

## 2020-03-10 NOTE — CHART NOTE - NSCHARTNOTEFT_GEN_A_CORE
Evaluated pt at bedside for LUE pain. Inflammation noted over dorsum of hand, forearm and arm. Patient is able to actively move all digits (flexion, abduction, adduction), flexion and extension of wrist, sensation over entire LUE, pulses palpable and capillary refill less than 2 seconds. Low suspicion for compartment syndrome at this time.     PLAN:  Continue arm elevation  IV Abx  Warm compresses  Surgery will follow closely

## 2020-03-10 NOTE — CONSULT NOTE ADULT - ASSESSMENT
ASSESSMENT:  44y Female S/P RNYGB 3/3/20, had IV placed in L dorsal hand/wrist, developed erythema and tenderness, then edema. Presents with worsening pain and edema, with extension of erythema. No fever. WBC 7.     PLAN:   labs as above, reviewed  Imaging V. duplex (+) for superficial thrombophlebitis L cephalic v.  Arm elevation  Warm compress  IV abx: clindamycin  ED Obs for IV abx    Above plan discussed with Dr. Price, patient, and ED team  --------------------------------------------------------------------------------------    03-10-20 @ 00:22
44y Female S/P RNYGB 3/3/20 pre-op weight 324lbs, 50 BMI; has lost 20lbs since her procedure, presents with worsening LUE erythema and edema/pain since the day of her procedure. The area was marked prior to DC from the hospital pOD1. She noticed spread of the erythema beyond the area that had been previously marked, with worsening pain in her wrist and forearm. Denies fever/chills/purulence/drainage.     IMPRESSION:  # Superficial left cephalic vein non suppurative thrombophlebitis   -Duplex with thrombosed L cephalic vein  -WBC 7.4    RECOMMENDATIONS:  -warm compressors to site  -ancef 2 gm iv q8h  -d/c other ABx

## 2020-03-10 NOTE — H&P ADULT - NSHPLABSRESULTS_GEN_ALL_CORE
Vitals:    Vital Signs Last 24 Hrs  T(C): 36.2 (10 Mar 2020 15:52), Max: 37.6 (10 Mar 2020 03:57)  T(F): 97.2 (10 Mar 2020 15:52), Max: 99.7 (10 Mar 2020 03:57)  HR: 83 (10 Mar 2020 15:52) (82 - 111)  BP: 116/56 (10 Mar 2020 15:52) (102/51 - 153/84)  BP(mean): --  RR: 18 (10 Mar 2020 15:52) (17 - 18)  SpO2: 99% (10 Mar 2020 15:52) (97% - 99%)    Labs:     03-09    136  |  99  |  10  ----------------------------<  103<H>  4.1   |  24  |  0.7    Ca    9.3      09 Mar 2020 22:11    TPro  6.8  /  Alb  4.3  /  TBili  0.5  /  DBili  x   /  AST  23  /  ALT  18  /  AlkPhos  51  03-09                          11.5   7.43  )-----------( 241      ( 09 Mar 2020 22:11 )             37.7         LIVER FUNCTIONS - ( 09 Mar 2020 22:11 )  Alb: 4.3 g/dL / Pro: 6.8 g/dL / ALK PHOS: 51 U/L / ALT: 18 U/L / AST: 23 U/L / GGT: x           RADIOLOGY    < from: [facility] Duplex Upper Ext Vein Scan, Left (03.09.20 @ 23:09) >    Impression:    The left cephalic vein is thrombosed     < end of copied text >

## 2020-03-10 NOTE — ED CDU PROVIDER INITIAL DAY NOTE - PHYSICAL EXAMINATION
VITAL SIGNS: I have reviewed nursing notes and confirm.  CONSTITUTIONAL: Well-developed; well-nourished; in no acute distress.  SKIN: Skin exam is warm and dry, no acute rash.  HEAD: Normocephalic; atraumatic.  EYES: Conjunctiva and sclera clear.  ENT: No nasal discharge; airway clear.  CARD: S1, S2 normal; no murmurs, gallops, or rubs. Regular rate and rhythm.  RESP: No wheezes, rales or rhonchi. Speaking in full sentences.   ABD: Normal bowel sounds; soft; non-distended; non-tender; No rebound or guarding. Surgical sites clean, dry, intact, healing well.   EXT: Normal ROM. No clubbing, cyanosis. (+) erythema and swelling to dorsal aspect of left hand spreading to proximal elbow. No drainage. NV intact.   NEURO: Alert, oriented. Grossly unremarkable. No focal deficits.

## 2020-03-10 NOTE — ED CDU PROVIDER INITIAL DAY NOTE - OBJECTIVE STATEMENT
43 yo F with PMHx of borderline HTN, HLD, hypothyroidism, GRECIA, anxiety/depression, thalassemia minor and recent gastric bypass on 3/3/2020 by Dr. Portillo presents to the ED c/o worsening redness/pain ot left hand/arm. Pt had IV placed in that location while in hospital and noticed redness developing around the site and when discharged she was sent prescription for keflex and doxy. Her pharmacy did not have that medicine so her prescription was changed to Augmentin and she has only taken 3 doses so far. She came to ED after speaking to on-call surgeon who recommended evaluation in ED. She denies other complaints Pt denies fever, chills, nausea, vomiting, abdominal pain, diarrhea, headache, dizziness, weakness, chest pain, SOB, back pain, LOC, trauma, urinary symptoms, cough, calf pain/swelling, recent travel, recent surgery. Pt denies fever, chills, nausea, vomiting, abdominal pain, diarrhea, headache, dizziness, weakness, chest pain, SOB, back pain, LOC, trauma, urinary symptoms, cough.

## 2020-03-10 NOTE — ED ADULT NURSE REASSESSMENT NOTE - NS ED NURSE REASSESS COMMENT FT1
Patient admitted to observation unit for further observation for cellulitis. Patient A&Ox4, ambulates with steady gait. Patient offers no complaints at this time. Will continue to monitor.

## 2020-03-10 NOTE — CONSULT NOTE ADULT - SUBJECTIVE AND OBJECTIVE BOX
Consultation Note  =====================================================    HPI: 44y Female  HPI:      PAST MEDICAL & SURGICAL HISTORY:  H/O thalassemia minor  GRECIA on CPAP  Depression  Obesity: bmi  47.6  H/O gastric ulcer  OA (osteoarthritis)  Hypothyroidism  HTN (hypertension): borderline -no meds  HLD (hyperlipidemia)  Lower back pain  Anxiety  H/O umbilical hernia repair  History of D&C: HYSTEROSCOPY-AUG 2019  S/P tonsillectomy  H/O tubal ligation    Home Meds: Home Medications:    Allergies: Allergies    No Known Allergies    Intolerances      Soc:   Denies Tobacco/EtOH/Substance use  Tobacco: [  ] yes  EtOH: [  ] yes  Substance use: [  ] yes  Advanced Directives: Presumed Full Code     ROS:    REVIEW OF SYSTEMS    [ ] A ten-point review of systems was otherwise negative except as noted.  [ ] Due to altered mental status/intubation, subjective information were not able to be obtained from the patient. History was obtained, to the extent possible, from review of the chart and collateral sources of information.      CURRENT MEDICATIONS:   --------------------------------------------------------------------------------------  Neurologic Medications    Respiratory Medications    Cardiovascular Medications    Gastrointestinal Medications    Genitourinary Medications    Hematologic/Oncologic Medications    Antimicrobial/Immunologic Medications    Endocrine/Metabolic Medications    Topical/Other Medications    --------------------------------------------------------------------------------------    VITAL SIGNS, INS/OUTS (last 24 hours):  --------------------------------------------------------------------------------------  ICU Vital Signs Last 24 Hrs  T(C): 36.8 (09 Mar 2020 21:53), Max: 36.8 (09 Mar 2020 21:53)  T(F): 98.2 (09 Mar 2020 21:53), Max: 98.2 (09 Mar 2020 21:53)  HR: 111 (09 Mar 2020 21:53) (111 - 111)  BP: 153/84 (09 Mar 2020 21:53) (153/84 - 153/84)  BP(mean): --  ABP: --  ABP(mean): --  RR: 18 (09 Mar 2020 21:53) (18 - 18)  SpO2: 99% (09 Mar 2020 21:53) (99% - 99%)    I&O's Summary    --------------------------------------------------------------------------------------  PHYSICAL EXAM  General: NAD, AAOx3, calm and cooperative  HEENT: NCAT, LUCERO, EOMI, Trachea ML, Neck supple  Cardiac: RRR S1, S2, no Murmurs, rubs or gallops  Respiratory: CTAB, normal respiratory effort, breath sounds equal BL, no wheeze, rhonchi or crackles  Abdomen: Soft, non-distended, non-tender, +bowel sounds  Rectal: Good tone, +stool, no blood, no ricardo-anal masses/lesions, no fistulas, fissures, hemorrhoids  Musculoskeletal: Strength 5/5 BL UE/LE, ROM intact, compartments soft  Neuro: Sensation grossly intact and equal throughout, CN II-XII intact, no focal deficits  Vascular: Pulses 2+ throughout, extremities well perfused  Skin: Warm/dry, normal color, no jaundice  Incision/wound: healing well, dressings in place, clean, dry and intact    LABS  --------------------------------------------------------------------------------------  Labs:  CAPILLARY BLOOD GLUCOSE                              11.5   7.43  )-----------( 241      ( 09 Mar 2020 22:11 )             37.7       Auto Neutrophil %: 75.0 % (03-09-20 @ 22:11)  Auto Immature Granulocyte %: 0.3 % (03-09-20 @ 22:11)    03-09    136  |  99  |  10  ----------------------------<  103<H>  4.1   |  24  |  0.7      Calcium, Total Serum: 9.3 mg/dL (03-09-20 @ 22:11)      LFTs:             6.8  | 0.5  | 23       ------------------[51      ( 09 Mar 2020 22:11 )  4.3  | x    | 18          Lipase:x      Amylase:x        Lactate, Blood: 0.8 mmol/L (03-09-20 @ 22:11)  --------------------------------------------------------------------------------------  IMAGING RESULTS  Venous duplex LUE pending read, apparent superificial thrombophlebitis of cephalic vein at AC  ---------------------------------------------------------------------------------------    ASSESSMENT:  44y Female ***    PLAN:     Above plan discussed with  *** , patient, and *** team    --------------------------------------------------------------------------------------    03-10-20 @ 00:22 Consultation Note  =====================================================    HPI:   44y Female S/P RNYGB 3/3/20 pre-op weight 324lbs, 50 BMI; has lost 20lbs since her procedure, presents with worsening LUE erythema and edema/pain since the day of her procedure. The area was marked prior to DC from the hospital pOD1. She noticed spread of the erythema beyond the area that had been previously marked, with worsening pain in her wrist and forearm. Denies fever/chills/purulence/drainage.     PAST MEDICAL & SURGICAL HISTORY:  H/O thalassemia minor  GRECIA on CPAP  Depression  Obesity: bmi  47.6  H/O gastric ulcer  OA (osteoarthritis)  Hypothyroidism  HTN (hypertension): borderline -no meds  HLD (hyperlipidemia)  Lower back pain  Anxiety  H/O umbilical hernia repair  History of D&C: HYSTEROSCOPY-AUG 2019  S/P tonsillectomy  H/O tubal ligation    Home Meds: Home Medications:  GABAPENTIN  300 MG/6ML SOLN:  (10 Mar 2020 00:51)  LEVOTHYROXINE SODIUM  50 MCG TABS:  (10 Mar 2020 00:51)  OMEPRAZOLE  40 MG CPDR:  (10 Mar 2020 00:51)    Allergies: Allergies  No Known Allergies  Intolerances    Soc:   Denies Tobacco/EtOH/Substance use  Advanced Directives: Presumed Full Code     ROS:    REVIEW OF SYSTEMS  [ x ] A ten-point review of systems was otherwise negative except as noted.  [ ] Due to altered mental status/intubation, subjective information were not able to be obtained from the patient. History was obtained, to the extent possible, from review of the chart and collateral sources of information.  --------------------------------------------------------------------------------------  VITAL SIGNS, INS/OUTS (last 24 hours):  --------------------------------------------------------------------------------------  ICU Vital Signs Last 24 Hrs  T(C): 36.8 (09 Mar 2020 21:53), Max: 36.8 (09 Mar 2020 21:53)  T(F): 98.2 (09 Mar 2020 21:53), Max: 98.2 (09 Mar 2020 21:53)  HR: 111 (09 Mar 2020 21:53) (111 - 111)  BP: 153/84 (09 Mar 2020 21:53) (153/84 - 153/84)  RR: 18 (09 Mar 2020 21:53) (18 - 18)  SpO2: 99% (09 Mar 2020 21:53) (99% - 99%)  --------------------------------------------------------------------------------------  PHYSICAL EXAM  General: NAD, AAOx3, calm and cooperative  HEENT: NCAT, LUCERO, EOMI  Cardiac: RRR S1, S2  Respiratory: CTAB, normal respiratory effort, breath sounds equal BL  Abdomen: Soft, non-distended, non-tender, +bowel sounds  Musculoskeletal: Strength 5/5 BL UE/LE, ROM intact, +edema in forearm, compartments soft  Skin: Warm/dry, no jaundice; erythema extende from wrist to AC fossa, no open wounds, skin intact, blanches, no palpable cord, no drainage or purulence    LABS  --------------------------------------------------------------------------------------  CAPILLARY BLOOD GLUCOSE                        11.5   7.43  )-----------( 241      ( 09 Mar 2020 22:11 )             37.7       Auto Neutrophil %: 75.0 % (03-09-20 @ 22:11)  Auto Immature Granulocyte %: 0.3 % (03-09-20 @ 22:11)    03-09    136  |  99  |  10  ----------------------------<  103<H>  4.1   |  24  |  0.7    Calcium, Total Serum: 9.3 mg/dL (03-09-20 @ 22:11)    LFTs:         6.8  | 0.5  | 23       ------------------[51      ( 09 Mar 2020 22:11 )  4.3  | x    | 18          Lipase:x      Amylase:x        Lactate, Blood: 0.8 mmol/L (03-09-20 @ 22:11)  --------------------------------------------------------------------------------------  IMAGING RESULTS  Venous duplex LUE pending read, apparent superificial thrombophlebitis of cephalic vein at AC  ---------------------------------------------------------------------------------------

## 2020-03-10 NOTE — CONSULT NOTE ADULT - SUBJECTIVE AND OBJECTIVE BOX
MAHI KEBEDE  44y, Female  Allergy: No Known Allergies      All historical available data reviewed.    HPI:  44y Female S/P RNYGB 3/3/20 pre-op weight 324lbs, 50 BMI; has lost 20lbs since her procedure, presents with worsening LUE erythema and edema/pain since the day of her procedure. The area was marked prior to DC from the hospital pOD1. She noticed spread of the erythema beyond the area that had been previously marked, with worsening pain in her wrist and forearm. Denies fever/chills/purulence/drainage.   ID called for ABx    FAMILY HISTORY:  Family history of CVA: GRANDMOTHER    PAST MEDICAL & SURGICAL HISTORY:  H/O thalassemia minor  GRECIA on CPAP  Depression  Obesity: bmi  47.6  H/O gastric ulcer  OA (osteoarthritis)  Hypothyroidism  HTN (hypertension): borderline -no meds  HLD (hyperlipidemia)  Lower back pain  Anxiety  H/O umbilical hernia repair  History of D&C: HYSTEROSCOPY-AUG 2019  S/P tonsillectomy  H/O tubal ligation        VITALS:  T(F): 97.2, Max: 99.7 (03-10-20 @ 03:57)  HR: 83  BP: 116/56  RR: 18Vital Signs Last 24 Hrs  T(C): 36.2 (10 Mar 2020 15:52), Max: 37.6 (10 Mar 2020 03:57)  T(F): 97.2 (10 Mar 2020 15:52), Max: 99.7 (10 Mar 2020 03:57)  HR: 83 (10 Mar 2020 15:52) (82 - 111)  BP: 116/56 (10 Mar 2020 15:52) (102/51 - 153/84)  BP(mean): --  RR: 18 (10 Mar 2020 15:52) (18 - 18)  SpO2: 99% (10 Mar 2020 15:52) (97% - 99%)    TESTS & MEASUREMENTS:                        11.5   7.43  )-----------( 241      ( 09 Mar 2020 22:11 )             37.7     03-09    136  |  99  |  10  ----------------------------<  103<H>  4.1   |  24  |  0.7    Ca    9.3      09 Mar 2020 22:11    TPro  6.8  /  Alb  4.3  /  TBili  0.5  /  DBili  x   /  AST  23  /  ALT  18  /  AlkPhos  51  03-09    LIVER FUNCTIONS - ( 09 Mar 2020 22:11 )  Alb: 4.3 g/dL / Pro: 6.8 g/dL / ALK PHOS: 51 U/L / ALT: 18 U/L / AST: 23 U/L / GGT: x                   RADIOLOGY & ADDITIONAL TESTS:  Personal review of radiological diagnostics performed  Echo and EKG results noted when applicable.     MEDICATIONS:  clindamycin IVPB 600 milliGRAM(s) IV Intermittent every 8 hours  escitalopram 20 milliGRAM(s) Oral daily  levothyroxine 50 MICROGram(s) Oral daily  morphine  - Injectable 2 milliGRAM(s) IV Push every 6 hours PRN  pantoprazole    Tablet 40 milliGRAM(s) Oral before breakfast  vancomycin  IVPB          ANTIBIOTICS:  clindamycin IVPB 600 milliGRAM(s) IV Intermittent every 8 hours  vancomycin  IVPB

## 2020-03-10 NOTE — CONSULT NOTE ADULT - ATTENDING COMMENTS
43yo female s/p dick-en-Y gastric bypass 3/3/2020 presenting with LUE erythema and swelling. Patient reports mild erythema and swelling since the surgery that has been increasing. She received a dose of Cephodoxin, then crushed Augmentin without improvement. She was seen in the office earlier today and her cellulitis was outlined. At this time, the erythema spread beyond the lines slightly. She reports numbness and tingling with decreased range of motion of right wrist. Tolerating bariatric diet, ambulating well, abdominal pain controlled. No nausea/vomiting, GERD. Taking vitamins and protein shakes without issue. WBC 7. Duplex US demonstrates venous duplex, apparent superificial thrombophlebitis of cephalic vein at AC. Recommend IV antibiotics, admit to observation, arm elevation. Will perform serial exams.

## 2020-03-10 NOTE — H&P ADULT - NSHPPHYSICALEXAM_GEN_ALL_CORE
PHYSICAL EXAM:  GEN: No acute distress  LUNGS: Clear to auscultation bilaterally; no wheeze  HEART: S1/S2 present. RRR.  ABD: Soft, non-tender, non-distended. Bowel sounds present  MSK: LUE erythema and edema at the hand to the proximal elbow. Area demarcated. No LE edema  NEURO: AAOX3, non-focal

## 2020-03-10 NOTE — ED ADULT NURSE NOTE - OBJECTIVE STATEMENT
Pt presents to ED with complaints of worsening redness to left arm. As per pt. she was here last evening and discharged home on ABT secondary to cellulitis. Pt states pain and redness around site is worsening.

## 2020-03-10 NOTE — H&P ADULT - ASSESSMENT
44y Female S/P dick en y 3/3/20, GRECIA on CPAP, hypothyroidism, Gastric ulcer, presents with worsening LUE erythema and edema/pain since the day of her procedure.     # Superficial left cephalic vein non suppurative thrombophlebitis   - C/w Ancef 2gm q8  - c/w warm compresses  - prn morphine for pain control   - prn benadryl for drug rash from possible initial clindamycin.     #Recent RNY Gastric bypass  - c/w diet as tolerated, will c/w pureed diet for now   - f/u surgery recommendations     #Hypothyroidism  - c/w synthroid    #GRECIA c/w cpap    #HO gastric ulcers - c/w protonix     DVT PPx: Lovenox sc  GI PPX: Protonix   Ambulate as tolerated  Dispo: From home

## 2020-03-10 NOTE — ED CDU PROVIDER INITIAL DAY NOTE - PROGRESS NOTE DETAILS
pt seen bedside, on clindamycin will get intouch with bariatric surgery team caring for patient to reconfirm plan. pt has failed out patient po therapy. pt complaining of tingling of arm , contacted nariatric surgical team caring for patient will come reevaluate. pt will be started don vancomycin. pt seen bedside, on clindamycin will get in touch with bariatric surgery team caring for patient to reconfirm plan. pt has failed out patient po therapy. pt complaining of tingling of arm , contacted bariatric surgical team caring for patient will come reevaluate. pt will be started don vancomycin.

## 2020-03-11 ENCOUNTER — APPOINTMENT (OUTPATIENT)
Dept: SURGERY | Facility: CLINIC | Age: 45
End: 2020-03-11

## 2020-03-11 LAB
ANION GAP SERPL CALC-SCNC: 13 MMOL/L — SIGNIFICANT CHANGE UP (ref 7–14)
BASOPHILS # BLD AUTO: 0.02 K/UL — SIGNIFICANT CHANGE UP (ref 0–0.2)
BASOPHILS NFR BLD AUTO: 0.3 % — SIGNIFICANT CHANGE UP (ref 0–1)
BUN SERPL-MCNC: 6 MG/DL — LOW (ref 10–20)
CALCIUM SERPL-MCNC: 8.7 MG/DL — SIGNIFICANT CHANGE UP (ref 8.5–10.1)
CHLORIDE SERPL-SCNC: 100 MMOL/L — SIGNIFICANT CHANGE UP (ref 98–110)
CK MB CFR SERPL CALC: <1 NG/ML — SIGNIFICANT CHANGE UP (ref 0.6–6.3)
CO2 SERPL-SCNC: 26 MMOL/L — SIGNIFICANT CHANGE UP (ref 17–32)
CREAT SERPL-MCNC: 0.5 MG/DL — LOW (ref 0.7–1.5)
EOSINOPHIL # BLD AUTO: 0.31 K/UL — SIGNIFICANT CHANGE UP (ref 0–0.7)
EOSINOPHIL NFR BLD AUTO: 4.9 % — SIGNIFICANT CHANGE UP (ref 0–8)
GLUCOSE SERPL-MCNC: 93 MG/DL — SIGNIFICANT CHANGE UP (ref 70–99)
HCT VFR BLD CALC: 34.1 % — LOW (ref 37–47)
HGB BLD-MCNC: 10.4 G/DL — LOW (ref 12–16)
IMM GRANULOCYTES NFR BLD AUTO: 0.5 % — HIGH (ref 0.1–0.3)
LYMPHOCYTES # BLD AUTO: 1.38 K/UL — SIGNIFICANT CHANGE UP (ref 1.2–3.4)
LYMPHOCYTES # BLD AUTO: 21.6 % — SIGNIFICANT CHANGE UP (ref 20.5–51.1)
MCHC RBC-ENTMCNC: 18.2 PG — LOW (ref 27–31)
MCHC RBC-ENTMCNC: 30.5 G/DL — LOW (ref 32–37)
MCV RBC AUTO: 59.8 FL — LOW (ref 81–99)
MONOCYTES # BLD AUTO: 0.51 K/UL — SIGNIFICANT CHANGE UP (ref 0.1–0.6)
MONOCYTES NFR BLD AUTO: 8 % — SIGNIFICANT CHANGE UP (ref 1.7–9.3)
MRSA PCR RESULT.: NEGATIVE — SIGNIFICANT CHANGE UP
NEUTROPHILS # BLD AUTO: 4.14 K/UL — SIGNIFICANT CHANGE UP (ref 1.4–6.5)
NEUTROPHILS NFR BLD AUTO: 64.7 % — SIGNIFICANT CHANGE UP (ref 42.2–75.2)
NRBC # BLD: 0 /100 WBCS — SIGNIFICANT CHANGE UP (ref 0–0)
PLATELET # BLD AUTO: 217 K/UL — SIGNIFICANT CHANGE UP (ref 130–400)
POTASSIUM SERPL-MCNC: 3.6 MMOL/L — SIGNIFICANT CHANGE UP (ref 3.5–5)
POTASSIUM SERPL-SCNC: 3.6 MMOL/L — SIGNIFICANT CHANGE UP (ref 3.5–5)
RBC # BLD: 5.7 M/UL — HIGH (ref 4.2–5.4)
RBC # FLD: 20.5 % — HIGH (ref 11.5–14.5)
SODIUM SERPL-SCNC: 139 MMOL/L — SIGNIFICANT CHANGE UP (ref 135–146)
TROPONIN T SERPL-MCNC: <0.01 NG/ML — SIGNIFICANT CHANGE UP
WBC # BLD: 6.39 K/UL — SIGNIFICANT CHANGE UP (ref 4.8–10.8)
WBC # FLD AUTO: 6.39 K/UL — SIGNIFICANT CHANGE UP (ref 4.8–10.8)

## 2020-03-11 PROCEDURE — 99232 SBSQ HOSP IP/OBS MODERATE 35: CPT | Mod: GC,24

## 2020-03-11 PROCEDURE — 93010 ELECTROCARDIOGRAM REPORT: CPT

## 2020-03-11 PROCEDURE — 71045 X-RAY EXAM CHEST 1 VIEW: CPT | Mod: 26

## 2020-03-11 RX ORDER — VANCOMYCIN HCL 1 G
1500 VIAL (EA) INTRAVENOUS ONCE
Refills: 0 | Status: COMPLETED | OUTPATIENT
Start: 2020-03-11 | End: 2020-03-11

## 2020-03-11 RX ORDER — CLONAZEPAM 1 MG
0.5 TABLET ORAL EVERY 8 HOURS
Refills: 0 | Status: DISCONTINUED | OUTPATIENT
Start: 2020-03-11 | End: 2020-03-12

## 2020-03-11 RX ORDER — VANCOMYCIN HCL 1 G
VIAL (EA) INTRAVENOUS
Refills: 0 | Status: DISCONTINUED | OUTPATIENT
Start: 2020-03-11 | End: 2020-03-12

## 2020-03-11 RX ORDER — HEPARIN SODIUM 5000 [USP'U]/ML
5000 INJECTION INTRAVENOUS; SUBCUTANEOUS EVERY 8 HOURS
Refills: 0 | Status: DISCONTINUED | OUTPATIENT
Start: 2020-03-11 | End: 2020-03-12

## 2020-03-11 RX ORDER — ACETAMINOPHEN 500 MG
650 TABLET ORAL EVERY 6 HOURS
Refills: 0 | Status: DISCONTINUED | OUTPATIENT
Start: 2020-03-11 | End: 2020-03-12

## 2020-03-11 RX ORDER — VANCOMYCIN HCL 1 G
1500 VIAL (EA) INTRAVENOUS EVERY 12 HOURS
Refills: 0 | Status: DISCONTINUED | OUTPATIENT
Start: 2020-03-11 | End: 2020-03-12

## 2020-03-11 RX ADMIN — Medication 650 MILLIGRAM(S): at 22:23

## 2020-03-11 RX ADMIN — HEPARIN SODIUM 5000 UNIT(S): 5000 INJECTION INTRAVENOUS; SUBCUTANEOUS at 21:02

## 2020-03-11 RX ADMIN — Medication 300 MILLIGRAM(S): at 18:02

## 2020-03-11 RX ADMIN — Medication 25 MILLIGRAM(S): at 23:54

## 2020-03-11 RX ADMIN — Medication 100 MILLIGRAM(S): at 21:02

## 2020-03-11 RX ADMIN — PANTOPRAZOLE SODIUM 40 MILLIGRAM(S): 20 TABLET, DELAYED RELEASE ORAL at 05:51

## 2020-03-11 RX ADMIN — Medication 300 MILLIGRAM(S): at 08:30

## 2020-03-11 RX ADMIN — Medication 100 MILLIGRAM(S): at 13:55

## 2020-03-11 RX ADMIN — ESCITALOPRAM OXALATE 20 MILLIGRAM(S): 10 TABLET, FILM COATED ORAL at 11:32

## 2020-03-11 RX ADMIN — Medication 25 MILLIGRAM(S): at 01:18

## 2020-03-11 RX ADMIN — Medication 50 MICROGRAM(S): at 05:51

## 2020-03-11 RX ADMIN — Medication 0.5 MILLIGRAM(S): at 21:01

## 2020-03-11 RX ADMIN — Medication 100 MILLIGRAM(S): at 05:51

## 2020-03-11 RX ADMIN — HEPARIN SODIUM 5000 UNIT(S): 5000 INJECTION INTRAVENOUS; SUBCUTANEOUS at 13:56

## 2020-03-11 NOTE — PROGRESS NOTE ADULT - SUBJECTIVE AND OBJECTIVE BOX
KEBEDE, MAHI  44y, Female    All available historical data reviewed    OVERNIGHT EVENTS:    feels better  has a rash on right arm  ROS:  General: Denies rigors, night sweats  HEENT: Denies headache, rhinorrhea, sore throat, eye pain  CV: Denies CP, palpitations  PULM: Denies wheezing, hemoptysis  GI: Denies hematemesis, hematochezia, melena  : Denies discharge, hematuria  MSK: Denies arthralgias, myalgias  SKIN: Denies rash, lesions  NEURO: Denies paresthesias, weakness  PSYCH: Denies depression, anxiety    VITALS:  T(F): 98, Max: 99 (03-11-20 @ 04:46)  HR: 85  BP: 125/68  RR: 19Vital Signs Last 24 Hrs  T(C): 36.7 (11 Mar 2020 09:21), Max: 37.2 (11 Mar 2020 04:46)  T(F): 98 (11 Mar 2020 09:21), Max: 99 (11 Mar 2020 04:46)  HR: 85 (11 Mar 2020 09:21) (75 - 85)  BP: 125/68 (11 Mar 2020 09:21) (101/63 - 125/68)  BP(mean): --  RR: 19 (11 Mar 2020 09:21) (18 - 19)  SpO2: 99% (10 Mar 2020 15:52) (99% - 99%)    TESTS & MEASUREMENTS:                        10.4   6.39  )-----------( 217      ( 11 Mar 2020 08:05 )             34.1     03-11    139  |  100  |  6<L>  ----------------------------<  93  3.6   |  26  |  0.5<L>    Ca    8.7      11 Mar 2020 08:05    TPro  6.8  /  Alb  4.3  /  TBili  0.5  /  DBili  x   /  AST  23  /  ALT  18  /  AlkPhos  51  03-09    LIVER FUNCTIONS - ( 09 Mar 2020 22:11 )  Alb: 4.3 g/dL / Pro: 6.8 g/dL / ALK PHOS: 51 U/L / ALT: 18 U/L / AST: 23 U/L / GGT: x             Culture - Blood (collected 03-09-20 @ 22:33)  Source: .Blood Blood  Preliminary Report (03-11-20 @ 04:01):    No growth to date.    Culture - Blood (collected 03-09-20 @ 22:30)  Source: .Blood Blood  Preliminary Report (03-11-20 @ 04:01):    No growth to date.            RADIOLOGY & ADDITIONAL TESTS:  Personal review of radiological diagnostics performed  Echo and EKG results noted when applicable.     MEDICATIONS:  acetaminophen   Tablet .. 650 milliGRAM(s) Oral every 6 hours PRN  clindamycin IVPB 600 milliGRAM(s) IV Intermittent every 8 hours  diphenhydrAMINE 25 milliGRAM(s) Oral every 4 hours PRN  escitalopram 20 milliGRAM(s) Oral daily  heparin  Injectable 5000 Unit(s) SubCutaneous every 8 hours  influenza   Vaccine 0.5 milliLiter(s) IntraMuscular once  levothyroxine 50 MICROGram(s) Oral daily  pantoprazole    Tablet 40 milliGRAM(s) Oral before breakfast  vancomycin  IVPB      vancomycin  IVPB 1500 milliGRAM(s) IV Intermittent every 12 hours      ANTIBIOTICS:  clindamycin IVPB 600 milliGRAM(s) IV Intermittent every 8 hours  vancomycin  IVPB      vancomycin  IVPB 1500 milliGRAM(s) IV Intermittent every 12 hours

## 2020-03-11 NOTE — PROGRESS NOTE ADULT - SUBJECTIVE AND OBJECTIVE BOX
GENERAL SURGERY PROGRESS NOTE     KEBEDE, MAHI  44y  Female  Hospital day :1d  POD:  Procedure:   OVERNIGHT EVENTS: patient states swelling/reddness of LUE is slightly improved. Pain well controlled.     T(F): 97.1 (03-10-20 @ 20:00), Max: 99.7 (03-10-20 @ 03:57)  HR: 75 (03-10-20 @ 20:00) (75 - 92)  BP: 107/38 (03-10-20 @ 20:00) (101/63 - 124/62)  ABP: --  ABP(mean): --  RR: 18 (03-10-20 @ 20:00) (17 - 18)  SpO2: 99% (03-10-20 @ 15:52) (97% - 99%)    GI proph:  pantoprazole    Tablet 40 milliGRAM(s) Oral before breakfast    AC/ proph:   ABx: ceFAZolin   IVPB      ceFAZolin   IVPB 2000 milliGRAM(s) IV Intermittent every 8 hours      PHYSICAL EXAM:  GENERAL: NAD, well-appearing  CHEST/LUNG: Clear to auscultation bilaterally  HEART: Regular rate and rhythm  ABDOMEN: Soft, Nontender, Nondistended;   EXTREMITIES:  LUE erythema dominating part of hand all the way to the elbow, edema present, radial pulse strongly palpable, motor and sensor function grossly intact       LABS  Labs:  CAPILLARY BLOOD GLUCOSE                              11.5   7.43  )-----------( 241      ( 09 Mar 2020 22:11 )             37.7         03-09    136  |  99  |  10  ----------------------------<  103<H>  4.1   |  24  |  0.7          LFTs:             6.8  | 0.5  | 23       ------------------[51      ( 09 Mar 2020 22:11 )  4.3  | x    | 18          Lipase:x      Amylase:x         Lactate, Blood: 0.8 mmol/L (03-09-20 @ 22:11)      RADIOLOGY & ADDITIONAL TESTS:      < from: VA Duplex Upper Ext Vein Scan, Left (03.09.20 @ 23:09) >  Impression:    The left cephalic vein is thrombosed     < end of copied text >

## 2020-03-12 ENCOUNTER — TRANSCRIPTION ENCOUNTER (OUTPATIENT)
Age: 45
End: 2020-03-12

## 2020-03-12 VITALS
HEART RATE: 73 BPM | DIASTOLIC BLOOD PRESSURE: 58 MMHG | SYSTOLIC BLOOD PRESSURE: 110 MMHG | TEMPERATURE: 97 F | RESPIRATION RATE: 18 BRPM

## 2020-03-12 PROCEDURE — 99024 POSTOP FOLLOW-UP VISIT: CPT

## 2020-03-12 RX ORDER — ACETAMINOPHEN 500 MG
2 TABLET ORAL
Qty: 0 | Refills: 0 | DISCHARGE
Start: 2020-03-12

## 2020-03-12 RX ORDER — GABAPENTIN 400 MG/1
300 CAPSULE ORAL THREE TIMES A DAY
Refills: 0 | Status: DISCONTINUED | OUTPATIENT
Start: 2020-03-12 | End: 2020-03-12

## 2020-03-12 RX ADMIN — Medication 300 MILLIGRAM(S): at 05:37

## 2020-03-12 RX ADMIN — Medication 1 TABLET(S): at 10:58

## 2020-03-12 RX ADMIN — ESCITALOPRAM OXALATE 20 MILLIGRAM(S): 10 TABLET, FILM COATED ORAL at 12:00

## 2020-03-12 RX ADMIN — HEPARIN SODIUM 5000 UNIT(S): 5000 INJECTION INTRAVENOUS; SUBCUTANEOUS at 05:37

## 2020-03-12 RX ADMIN — Medication 50 MICROGRAM(S): at 05:36

## 2020-03-12 RX ADMIN — PANTOPRAZOLE SODIUM 40 MILLIGRAM(S): 20 TABLET, DELAYED RELEASE ORAL at 05:36

## 2020-03-12 RX ADMIN — Medication 100 MILLIGRAM(S): at 05:36

## 2020-03-12 NOTE — DISCHARGE NOTE PROVIDER - NSDCCPCAREPLAN_GEN_ALL_CORE_FT
PRINCIPAL DISCHARGE DIAGNOSIS  Diagnosis: Cellulitis  Assessment and Plan of Treatment: Activity: Continue to elevate your left arm and apply warm compresses.   Antibiotics: You are being sent Bactrim to your pharmacy. Please continue to take it as prescribed.   Pain control: You may take over the counter Tylenol for pain three times per day with food for the next 3 days.   Follow up: Please call the number provided to schedule an appointment with Dr. Portillo. PRINCIPAL DISCHARGE DIAGNOSIS  Diagnosis: Cellulitis  Assessment and Plan of Treatment: Activity: Continue to elevate your left arm and apply warm compresses.   Antibiotics: You are being sent Bactrim to your pharmacy. Please continue to take it as prescribed.   Follow up: Please call the number provided to schedule an appointment with Dr. Portillo.

## 2020-03-12 NOTE — PROGRESS NOTE ADULT - SUBJECTIVE AND OBJECTIVE BOX
GENERAL SURGERY PROGRESS NOTE     KEBEDE, MAHI  44y  Female  Hospital day :2d  POD:  Procedure:   OVERNIGHT EVENTS: Uneventful. Patient endorses improvement of LUE pain, swelling, and movement.    T(F): 97.5 (03-12-20 @ 00:00), Max: 99 (03-11-20 @ 04:46)  HR: 78 (03-12-20 @ 00:00) (78 - 85)  BP: 107/55 (03-12-20 @ 00:00) (107/55 - 125/68)  ABP: --  ABP(mean): --  RR: 19 (03-12-20 @ 00:00) (18 - 19)  SpO2: --    DIET/FLUIDS:   NG:                                                                                DRAINS:     BM:     EMESIS:     URINE:      GI proph:  pantoprazole    Tablet 40 milliGRAM(s) Oral before breakfast    AC/ proph: heparin  Injectable 5000 Unit(s) SubCutaneous every 8 hours    ABx: clindamycin IVPB 600 milliGRAM(s) IV Intermittent every 8 hours  vancomycin  IVPB      vancomycin  IVPB 1500 milliGRAM(s) IV Intermittent every 12 hours      PHYSICAL EXAM:  GENERAL: NAD, well-appearing  CHEST/LUNG: Clear to auscultation bilaterally  HEART: Regular rate and rhythm  ABDOMEN: Soft, Nontender, Nondistended;   EXTREMITIES:  LUE improving erythema, receding from the prior drawn outline. improving edema, radial pulse strongly palpable, motor and sensor function grossly intact       LABS  Labs:  CAPILLARY BLOOD GLUCOSE                              10.4   6.39  )-----------( 217      ( 11 Mar 2020 08:05 )             34.1       Auto Neutrophil %: 64.7 % (03-11-20 @ 08:05)  Auto Immature Granulocyte %: 0.5 % (03-11-20 @ 08:05)    03-11    139  |  100  |  6<L>  ----------------------------<  93  3.6   |  26  |  0.5<L>      Calcium, Total Serum: 8.7 mg/dL (03-11-20 @ 08:05)      LFTs:     Lactate, Blood: 0.8 mmol/L (03-09-20 @ 22:11)      Coags:    CARDIAC MARKERS ( 11 Mar 2020 11:05 )  x     / <0.01 ng/mL / x     / x     / <1.0 ng/mL              Culture - Blood (collected 09 Mar 2020 22:33)  Source: .Blood Blood  Preliminary Report (11 Mar 2020 04:01):    No growth to date.    Culture - Blood (collected 09 Mar 2020 22:30)  Source: .Blood Blood  Preliminary Report (11 Mar 2020 04:01):    No growth to date.          RADIOLOGY & ADDITIONAL TESTS:    < from: Xray Chest 1 View- PORTABLE-Urgent (03.11.20 @ 13:48) >  No radiographic evidence of acute cardiopulmonary disease.    < end of copied text > GENERAL SURGERY PROGRESS NOTE     KEBEDE, MAHI  44y  Female  Hospital day :2d  POD:  Procedure:   OVERNIGHT EVENTS: Uneventful. Patient endorses improvement of LUE pain, swelling, and movement.    T(F): 97.5 (03-12-20 @ 00:00), Max: 99 (03-11-20 @ 04:46)  HR: 78 (03-12-20 @ 00:00) (78 - 85)  BP: 107/55 (03-12-20 @ 00:00) (107/55 - 125/68)  ABP: --  ABP(mean): --  RR: 19 (03-12-20 @ 00:00) (18 - 19)  SpO2: --    DIET/FLUIDS:   NG:                                                                                DRAINS:     BM:     EMESIS:     URINE:      GI proph:  pantoprazole    Tablet 40 milliGRAM(s) Oral before breakfast    AC/ proph: heparin  Injectable 5000 Unit(s) SubCutaneous every 8 hours    ABx: clindamycin IVPB 600 milliGRAM(s) IV Intermittent every 8 hours  vancomycin  IVPB      vancomycin  IVPB 1500 milliGRAM(s) IV Intermittent every 12 hours      PHYSICAL EXAM:  GENERAL: NAD, well-appearing  CHEST/LUNG: Clear to auscultation bilaterally  HEART: Regular rate and rhythm  ABDOMEN: Soft, Nontender, Nondistended;   EXTREMITIES:  LUE improving erythema, receding from the prior drawn outline. improving edema, radial pulse strongly palpable, motor and sensor function grossly intact                           10.4   6.39  )-----------( 217      ( 11 Mar 2020 08:05 )             34.1       Auto Neutrophil %: 64.7 % (03-11-20 @ 08:05)  Auto Immature Granulocyte %: 0.5 % (03-11-20 @ 08:05)    03-11    139  |  100  |  6<L>  ----------------------------<  93  3.6   |  26  |  0.5<L>      Calcium, Total Serum: 8.7 mg/dL (03-11-20 @ 08:05)      LFTs:     Lactate, Blood: 0.8 mmol/L (03-09-20 @ 22:11)      Culture - Blood (collected 09 Mar 2020 22:33)  Source: .Blood Blood  Preliminary Report (11 Mar 2020 04:01):    No growth to date.    Culture - Blood (collected 09 Mar 2020 22:30)  Source: .Blood Blood  Preliminary Report (11 Mar 2020 04:01):    No growth to date.          RADIOLOGY & ADDITIONAL TESTS:    < from: Xray Chest 1 View- PORTABLE-Urgent (03.11.20 @ 13:48) >  No radiographic evidence of acute cardiopulmonary disease.    < end of copied text >

## 2020-03-12 NOTE — PROGRESS NOTE ADULT - I WAS PHYSICALLY PRESENT FOR THE KEY PORTIONS OF THE EVALUATION AND MANAGEMENT (E/M) SERVICE PROVIDED.  I AGREE WITH THE ABOVE HISTORY, PHYSICAL, AND PLAN WHICH I HAVE REVIEWED AND EDITED WHERE APPROPRIATE
Pt with O2 sat 88-91% on NC - pt mouth breathing.  Dr Cordero aware with resp called for consult.    
Statement Selected
Statement Selected

## 2020-03-12 NOTE — DISCHARGE NOTE NURSING/CASE MANAGEMENT/SOCIAL WORK - PATIENT PORTAL LINK FT
You can access the FollowMyHealth Patient Portal offered by [facility] by registering at the following website: [email or web address]. By joining [employer]’s FollowMyHealth portal, you will also be able to view your health information using other applications (apps) compatible with our system.

## 2020-03-12 NOTE — DISCHARGE NOTE PROVIDER - HOSPITAL COURSE
Patient is a 45 y/o F with PMHx of morbid obesity s/p gastric bypass 3/3/2020, GRECIA on CPAP, hypothyroidism, gastric ulcer, presented to the ED on 3/10 for pain and erythema to the LUE since her procedure. Patient noticed the spread of erythema beyond the markings, but denied any fevers, chills, SOB, palpitations, purulence, drainage, paresthesias. There was low suspicion for compartment syndrome.  She received Clindamycin in the ED and then was switched to vancomycin. Venous duplex showed superficial thrombophlebitis of L cephalic vein. Since then, the pain, erythema, and edema has improved. Patient has been tolerating bariatric diet, pain is controlled, denies any nausea or vomiting, and is ambulating appropriately. Patient is a 43 y/o F with PMHx of morbid obesity s/p gastric bypass 3/3/2020, GRECIA on CPAP, hypothyroidism, gastric ulcer, presented to the ED on 3/10 for pain and erythema to the LUE since her discharge. Patient noticed the spread of erythema beyond the markings, but denied any fevers, chills, SOB, palpitations, purulence, drainage, paresthesias. There was low suspicion for compartment syndrome.  She received Clindamycin in the ED and then was switched to vancomycin. Venous duplex showed superficial thrombophlebitis of L cephalic vein. Since then, the pain, erythema, and edema has improved. Patient has been tolerating bariatric diet, pain is controlled, denies any nausea or vomiting, and is ambulating appropriately. Patient to f/u with Dr. Portillo next week.  DC on po abx.

## 2020-03-12 NOTE — CHART NOTE - NSCHARTNOTEFT_GEN_A_CORE
Registered Dietitian Limited Note    Received c/s for bariatric sx, however bariatric team following pt at this time; therefore c/s is marked as complete.

## 2020-03-12 NOTE — DISCHARGE NOTE PROVIDER - CARE PROVIDER_API CALL
Kim Portillo)  Surgery  [address] [place] [address], [address] [place] [address]  Phone: [phone]  Fax: [phone]  Follow Up Time:

## 2020-03-12 NOTE — PROGRESS NOTE ADULT - ASSESSMENT
44y Female S/P RNYGB 3/3/20, had IV placed in L dorsal hand/wrist, developed erythema and tenderness, then edema. Presents with worsening pain and edema, with extension of erythema. No fever. WBC 7. Duplex demonstrated a left cephalic vein thrombosis.    Plan:   - Continue arm elevation   - Continue warm compress  - Continue abx: clinda and vanc   - pain control   - ambulate   - Diet: Kermit Phase 2
44y Female S/P RNYGB 3/3/20, had IV placed in L dorsal hand/wrist, developed erythema and tenderness, then edema. Presents with worsening pain and edema, with extension of erythema. No fever. WBC 7. Duplex demonstrated a left cephalic vein thrombosis.    Plan:   - arm elevation   - warm compress  - abx: clinda and vanc   - pain control   - ambulate
44y Female S/P RNYGB 3/3/20 pre-op weight 324lbs, 50 BMI; has lost 20lbs since her procedure, presents with worsening LUE erythema and edema/pain since the day of her procedure. The area was marked prior to DC from the hospital pOD1. She noticed spread of the erythema beyond the area that had been previously marked, with worsening pain in her wrist and forearm. Denies fever/chills/purulence/drainage.     IMPRESSION:  # Superficial left cephalic vein non suppurative thrombophlebitis  which has responded well to ABx  -Duplex with thrombosed L cephalic vein  -WBC 6.3  -BCx 3/9 NG  #Drug rash possibly secondary to ancef ?    RECOMMENDATIONS:  -warm compressors to LUE  -po Zyvox 600 mg q12h for 7 days  -recall prn please

## 2020-03-12 NOTE — PROGRESS NOTE ADULT - ATTENDING COMMENTS
Patient seen an examined.  Patient reports feeling much better today.  Pain is decreased, erythema is much improved; swelling (hand and forearm) has decreased.  Plan to continue IV Abx, test for MRSA.  Patient counseled to be OOB Ambulating. Continue GI/DVT Prophylaxis.
Patient seen and examined.  Pt reports feeling much better.  Erythema is resolved, swelling has decreased.  Thrombosed vein at previous IV site noted.  Will continue warm soaks, abx (p.o). DC planning. Pt to f/u with me next week.

## 2020-03-12 NOTE — DISCHARGE NOTE PROVIDER - NSDCMRMEDTOKEN_GEN_ALL_CORE_FT
acetaminophen 325 mg oral tablet: 2 tab(s) orally every 6 hours, As needed, Moderate Pain (4 - 6)  escitalopram 20 mg oral tablet: 1 tab(s) orally once a day  GABAPENTIN  300 MG/6ML SOLN:   LEVOTHYROXINE SODIUM  50 MCG TABS:   OMEPRAZOLE  40 MG CPDR:   sulfamethoxazole-trimethoprim 800 mg-160 mg oral tablet: 1 tab(s) orally every 12 hours escitalopram 20 mg oral tablet: 1 tab(s) orally once a day  GABAPENTIN  300 MG/6ML SOLN:   LEVOTHYROXINE SODIUM  50 MCG TABS:   OMEPRAZOLE  40 MG CPDR:   sulfamethoxazole-trimethoprim 800 mg-160 mg oral tablet: 1 tab(s) orally every 12 hours escitalopram 20 mg oral tablet: 1 tab(s) orally once a day  GABAPENTIN  300 MG/6ML SOLN:   LEVOTHYROXINE SODIUM  50 MCG TABS:   OMEPRAZOLE  40 MG CPDR:   sulfamethoxazole-trimethoprim 800 mg-160 mg oral tablet: 1 tab(s) orally every 12 hours  sulfamethoxazole-trimethoprim 800 mg-160 mg oral tablet: 1 tab(s) orally every 12 hours

## 2020-03-15 LAB
CULTURE RESULTS: SIGNIFICANT CHANGE UP
CULTURE RESULTS: SIGNIFICANT CHANGE UP
SPECIMEN SOURCE: SIGNIFICANT CHANGE UP
SPECIMEN SOURCE: SIGNIFICANT CHANGE UP

## 2020-03-17 DIAGNOSIS — L03.90 CELLULITIS, UNSPECIFIED: ICD-10-CM

## 2020-03-17 DIAGNOSIS — R06.02 SHORTNESS OF BREATH: ICD-10-CM

## 2020-03-17 DIAGNOSIS — E03.9 HYPOTHYROIDISM, UNSPECIFIED: ICD-10-CM

## 2020-03-17 DIAGNOSIS — I10 ESSENTIAL (PRIMARY) HYPERTENSION: ICD-10-CM

## 2020-03-17 DIAGNOSIS — J44.1 CHRONIC OBSTRUCTIVE PULMONARY DISEASE WITH (ACUTE) EXACERBATION: ICD-10-CM

## 2020-03-17 DIAGNOSIS — E66.01 MORBID (SEVERE) OBESITY DUE TO EXCESS CALORIES: ICD-10-CM

## 2020-03-17 DIAGNOSIS — E78.5 HYPERLIPIDEMIA, UNSPECIFIED: ICD-10-CM

## 2020-03-17 DIAGNOSIS — I80.02 PHLEBITIS AND THROMBOPHLEBITIS OF SUPERFICIAL VESSELS OF LEFT LOWER EXTREMITY: ICD-10-CM

## 2020-03-17 DIAGNOSIS — F41.9 ANXIETY DISORDER, UNSPECIFIED: ICD-10-CM

## 2020-03-18 ENCOUNTER — APPOINTMENT (OUTPATIENT)
Dept: SURGERY | Facility: CLINIC | Age: 45
End: 2020-03-18
Payer: COMMERCIAL

## 2020-03-18 VITALS — WEIGHT: 293 LBS | BODY MASS INDEX: 45.99 KG/M2 | HEIGHT: 67 IN | TEMPERATURE: 97.9 F

## 2020-03-18 PROCEDURE — 99024 POSTOP FOLLOW-UP VISIT: CPT

## 2020-03-27 NOTE — PLAN
[FreeTextEntry1] : PLAN: Increase exercise as discussed.\par            RTO in 2 weeks.\par            Call with concerns.

## 2020-03-27 NOTE — PHYSICAL EXAM
[Normal] : affect appropriate [de-identified] : Soft, nondistended. Incisions dry, clean and healing well.

## 2020-03-27 NOTE — REASON FOR VISIT
[Post Operative Visit] : a post operative visit for [Morbid Obesity (BMI>40)] : morbid obesity (bmi>40) [FreeTextEntry2] : RYGB on 3/3/2020

## 2020-03-27 NOTE — HISTORY OF PRESENT ILLNESS
[Procedure: ___] : Procedure performed: [unfilled]  [Date of Surgery: ___] : Date of Surgery:   [unfilled] [Pre-Op Weight ___] : Pre-op weight was [unfilled] lbs [___ Days Post Op] : [unfilled] days  [de-identified] : Patient had surgery approximately 2 weeks ago.  She was admitted to [facility] on 3/10 with phlebitis of left forearm. She still has some mild swelling at the wrist. She denies fever/chills.\par She denies heartburn/reflux/vomiting and food intolerance.\par Anxiety is unchanged and lower back pain resolved. She is using her CPAP machine.

## 2020-03-27 NOTE — DATA REVIEWED
[FreeTextEntry1] : \par Wt. change since last visit: -11 lbs\par %EWL: 14\par TWL: 25 lbs\par BMI: 46.8\par \par

## 2020-03-27 NOTE — ASSESSMENT
[FreeTextEntry1] : MAHI KEBEDE is a 44 year female seen today for Bariatric postoperative visit. She is feeling better.\par Patient is compliant with dietary guidelines.\par She eats 3 meals/day. Breakfast - Greek yogurt and eggs.  Lunch lentil soup, sugar free protein pudding.  Dinner - ricotta or cottage cheese. She is drinking 1-2 protein shakes daily.\par Fluid intake is adequate with mainly water and warm teas.\par She is walking daily for 5-10 minutes.  Recommend increasing exercise to 20-30 minutes.  Reinforced no heavy lifting, bending and pulling for 6 more weeks.\par \par

## 2020-03-27 NOTE — ADDENDUM
[FreeTextEntry1] : Patient seen, examined and reviewed with practitioner.  I agree with the assessment and plan; note amended as appropriate.\par Patient reports feeling much better, cellulitis of forearm/arm now resolved.  Thrombosed vein palpable at wrist site, non-tender.

## 2020-03-30 RX ORDER — AMOXICILLIN AND CLAVULANATE POTASSIUM 500; 125 MG/1; MG/1
500-125 TABLET, FILM COATED ORAL 3 TIMES DAILY
Qty: 21 | Refills: 0 | Status: DISCONTINUED | COMMUNITY
Start: 2020-03-09 | End: 2020-03-30

## 2020-03-30 RX ORDER — GABAPENTIN 250 MG/5ML
250 SOLUTION ORAL
Qty: 50 | Refills: 0 | Status: DISCONTINUED | COMMUNITY
Start: 2020-03-05 | End: 2020-03-30

## 2020-04-01 ENCOUNTER — APPOINTMENT (OUTPATIENT)
Dept: SURGERY | Facility: CLINIC | Age: 45
End: 2020-04-01

## 2020-04-03 ENCOUNTER — APPOINTMENT (OUTPATIENT)
Dept: SURGERY | Facility: CLINIC | Age: 45
End: 2020-04-03
Payer: COMMERCIAL

## 2020-04-03 VITALS — HEIGHT: 67 IN | WEIGHT: 292.5 LBS | BODY MASS INDEX: 45.91 KG/M2

## 2020-04-03 DIAGNOSIS — L03.119 CELLULITIS OF UNSPECIFIED PART OF LIMB: ICD-10-CM

## 2020-04-03 PROCEDURE — 99024 POSTOP FOLLOW-UP VISIT: CPT

## 2020-04-03 NOTE — REASON FOR VISIT
[Post Operative Visit] : a post operative visit for [Morbid Obesity (BMI>40)] : morbid obesity (bmi>40) [FreeTextEntry2] : s/p RYGB on 3/3/2020.

## 2020-04-03 NOTE — PLAN
[FreeTextEntry1] : PLAN:  RTO in 2 months with blood work.\par             Continue with exercise and meal planning.\par             Call with concerns.

## 2020-04-03 NOTE — DATA REVIEWED
[FreeTextEntry1] : Today's stated weight: 292.5 lbs\par Wt. change since last visit: -6.5 lbs\par %EWL: 17\par TWL: 31.5 lbs\par BMI: 45.8\par \par

## 2020-04-03 NOTE — ASSESSMENT
[FreeTextEntry1] : MAHI KEBEDE is a 44 year female s/p RYGB on 3/3/20. Patient states that she feels great.\par Patient is compliant with dietary guidelines.\par She eats 3 meals/day.  Breakfast - oatmeal, egg with a small slice of sweet potato. Lunch - Baby Bell cheese, lentil soup or Greek yogurt. Dinner fish, ricotta cheese or a small meatball. She is adding protein powder to her meals.\par Fluid intake is adequate with mainly warm teas or water. She is tolerating warm liquids better than cold liquids.\par She is walking daily for 25 minutes. Reinforced no heavy lifting, bending and pulling for 1 more month.

## 2020-04-03 NOTE — HISTORY OF PRESENT ILLNESS
[Procedure: ___] : Procedure performed: [unfilled]  [Date of Surgery: ___] : Date of Surgery:   [unfilled] [Pre-Op Weight ___] : Pre-op weight was [unfilled] lbs [___ Months Post Op] : [unfilled] months [de-identified] : This is a Telehealth visit that was conducted from patient's home. She was hospitalized within 30 days postop at [facility] with left arm cellulitis.  [de-identified] : Patient had surgery approximately 1 month ago. She feels well.\par She denies heartburn/reflux/vomiting. She has poor tolerance with pureed chicken. No fever/chills.\par She is compliant with CPAP use. Back pain and anxiety improved.

## 2020-05-05 ENCOUNTER — APPOINTMENT (OUTPATIENT)
Dept: SURGERY | Facility: CLINIC | Age: 45
End: 2020-05-05
Payer: COMMERCIAL

## 2020-05-05 VITALS — HEIGHT: 67 IN | BODY MASS INDEX: 43 KG/M2 | WEIGHT: 274 LBS

## 2020-05-05 DIAGNOSIS — M54.2 CERVICALGIA: ICD-10-CM

## 2020-05-05 DIAGNOSIS — G89.29 CERVICALGIA: ICD-10-CM

## 2020-05-05 PROCEDURE — ZZZZZ: CPT

## 2020-05-07 PROBLEM — M54.2 NECK PAIN, CHRONIC: Status: ACTIVE | Noted: 2019-04-10

## 2020-05-12 NOTE — ED ADULT NURSE NOTE - TEMPLATE LIST FOR HEAD TO TOE ASSESSMENT
Hi, could you please schedule her for a face to face visit with me on Tuesday 5/26 at 9:40 for dermatitis? Thanks! General

## 2020-06-16 ENCOUNTER — APPOINTMENT (OUTPATIENT)
Dept: SURGERY | Facility: CLINIC | Age: 45
End: 2020-06-16
Payer: COMMERCIAL

## 2020-06-16 VITALS
SYSTOLIC BLOOD PRESSURE: 116 MMHG | HEIGHT: 67 IN | BODY MASS INDEX: 39.88 KG/M2 | TEMPERATURE: 97.7 F | DIASTOLIC BLOOD PRESSURE: 82 MMHG | WEIGHT: 254.1 LBS

## 2020-06-16 DIAGNOSIS — Z87.42 PERSONAL HISTORY OF OTHER DISEASES OF THE FEMALE GENITAL TRACT: ICD-10-CM

## 2020-06-16 DIAGNOSIS — E66.01 MORBID (SEVERE) OBESITY DUE TO EXCESS CALORIES: ICD-10-CM

## 2020-06-16 PROCEDURE — 99213 OFFICE O/P EST LOW 20 MIN: CPT

## 2020-06-16 NOTE — ASSESSMENT
[FreeTextEntry1] : MAHI KBEEDE is a 44 year female s/p RYGB on 3/3/20. Patient is doing well with her weight loss goals.\par Patient is compliant with dietary guidelines.\par She eats 3 meals/day. Breakfast - oatmeal or Special K or a 1/4 toasted rye toast with yogurt and spinach topping. Lunch - chicken soup or beans with a spoon of rice.  Dinner - chili, steak with green leafy vegetables. Snack - blueberries or Greek yogurt. \par Fluid intake is adequate with mainly warm teas, sugar free ice pops or water. She is still tolerating warm liquids better than cold liquids.\par She is riding her bike 3 - 4 days week for 45 minutes and she added resistant bands to her routine. \par \par

## 2020-06-16 NOTE — DATA REVIEWED
[FreeTextEntry1] : \par Wt. change since last visit: -20 lbs\par %EWL: 38\par TWL: 70 lbs\par BMI: 39.8\par \par

## 2020-06-16 NOTE — HISTORY OF PRESENT ILLNESS
[Procedure: ___] : Procedure performed: [unfilled]  [Date of Surgery: ___] : Date of Surgery:   [unfilled] [Pre-Op Weight ___] : Pre-op weight was [unfilled] lbs [___ Months Post Op] : [unfilled] months [de-identified] : Patient had surgery approximately 3 months ago. \par She denies heartburn/reflux/vomiting and food intolerance. She has been suffering from an occasional episode of constipation and will add a stool softener. \par Anxiety and back pain improved. She is not using her CPAP machine.  Discussed the risks of GRECIA and recommend that she follows up with pulmonary.

## 2020-06-16 NOTE — PLAN
[FreeTextEntry1] : PLAN: Continue with diet and exercise.\par            Blood work.\par            RTO in 3 months.\par            Call with concerns.

## 2020-07-01 ENCOUNTER — APPOINTMENT (OUTPATIENT)
Dept: BREAST CENTER | Facility: CLINIC | Age: 45
End: 2020-07-01

## 2020-07-06 NOTE — H&P PST ADULT - BP NONINVASIVE MEAN (MM HG)
What Type Of Note Output Would You Prefer (Optional)?: Bullet Format Hpi Title: Evaluation of Skin Lesions How Severe Are Your Spot(S)?: mild Have Your Spot(S) Been Treated In The Past?: has not been treated 94

## 2020-07-21 ENCOUNTER — RESULT REVIEW (OUTPATIENT)
Age: 45
End: 2020-07-21

## 2020-07-21 ENCOUNTER — OUTPATIENT (OUTPATIENT)
Dept: OUTPATIENT SERVICES | Facility: HOSPITAL | Age: 45
LOS: 1 days | Discharge: HOME | End: 2020-07-21
Payer: COMMERCIAL

## 2020-07-21 DIAGNOSIS — Z90.89 ACQUIRED ABSENCE OF OTHER ORGANS: Chronic | ICD-10-CM

## 2020-07-21 DIAGNOSIS — Z98.890 OTHER SPECIFIED POSTPROCEDURAL STATES: Chronic | ICD-10-CM

## 2020-07-21 DIAGNOSIS — Z98.51 TUBAL LIGATION STATUS: Chronic | ICD-10-CM

## 2020-07-21 DIAGNOSIS — Z12.31 ENCOUNTER FOR SCREENING MAMMOGRAM FOR MALIGNANT NEOPLASM OF BREAST: ICD-10-CM

## 2020-07-21 DIAGNOSIS — R92.2 INCONCLUSIVE MAMMOGRAM: ICD-10-CM

## 2020-07-21 PROCEDURE — G0279: CPT | Mod: 26

## 2020-07-21 PROCEDURE — 77066 DX MAMMO INCL CAD BI: CPT | Mod: 26

## 2020-07-21 PROCEDURE — 76641 ULTRASOUND BREAST COMPLETE: CPT | Mod: 26,50

## 2020-08-31 ENCOUNTER — APPOINTMENT (OUTPATIENT)
Dept: NEUROSURGERY | Facility: CLINIC | Age: 45
End: 2020-08-31

## 2020-09-04 ENCOUNTER — APPOINTMENT (OUTPATIENT)
Dept: SURGERY | Facility: CLINIC | Age: 45
End: 2020-09-04
Payer: COMMERCIAL

## 2020-09-04 VITALS — BODY MASS INDEX: 34.69 KG/M2 | TEMPERATURE: 97.3 F | HEIGHT: 67 IN | WEIGHT: 221 LBS

## 2020-09-04 DIAGNOSIS — R51 HEADACHE: ICD-10-CM

## 2020-09-04 PROCEDURE — 99213 OFFICE O/P EST LOW 20 MIN: CPT

## 2020-09-04 RX ORDER — OMEPRAZOLE 40 MG/1
40 CAPSULE, DELAYED RELEASE ORAL DAILY
Refills: 0 | Status: DISCONTINUED | COMMUNITY
Start: 2019-07-30 | End: 2020-09-04

## 2020-09-04 NOTE — HISTORY OF PRESENT ILLNESS
[Pre-Op Weight ___] : Pre-op weight was [unfilled] lbs [Procedure: ___] : Procedure performed: [unfilled]  [___ Months Post Op] : [unfilled] months [de-identified] : Patient had surgery approximately 6 months ago. She feels  bit overwhelmed at times as she feels that she is under constant scrutiny from her spouse who wants her to eat more. Encouraged patient to discuss the issue with her therapist as I advised her in the past to educate him about her surgery as well as the portion size that her newly formed stomach can hold at one meal.\par She denies heartburn/reflux/vomiting and food intolerance. Her constipation resolved. \par Anxiety and back pain improved. She is not using her CPAP machine. Discussed the risks of GRECIA and recommend that she follows up with pulmonary.  - covid

## 2020-09-04 NOTE — ADDENDUM
[FreeTextEntry1] : Plan of care reviewed and I concur with the assessment; note amended as appropriate.\par

## 2020-09-04 NOTE — ASSESSMENT
[FreeTextEntry1] : MAHI KEBEDE is a 44 year female s/p RYGB on 3/3/20. Patient is doing well with her weight loss goals.\par Patient is compliant with dietary guidelines.\par She eats 3 meals/day. Breakfast - 1/2 low fat English muffin with cheese/fruit or  Special K cereal. Lunch - low fat spinach and yogurt dip with carrots or mixed green salad with Feta cheese or grilled chicken. Dinner - marinated chicken, steak with green leafy vegetables and a small serving of potato. Snack - blueberries, peanut butter or Greek yogurt. \par Fluid intake is adequate with mainly warm teas, sugar free ice pops or water. She is still tolerating warm liquids better than cold liquids.\par She is walking or doing a light jog for 30-45 minutes. She also enjoys riding her bike 3-4 days/week for 45 minutes and she added weight bearing exercises. \par \par

## 2020-09-11 ENCOUNTER — APPOINTMENT (OUTPATIENT)
Dept: NEUROSURGERY | Facility: CLINIC | Age: 45
End: 2020-09-11
Payer: COMMERCIAL

## 2020-09-11 VITALS
HEART RATE: 73 BPM | RESPIRATION RATE: 18 BRPM | TEMPERATURE: 98.2 F | OXYGEN SATURATION: 98 % | SYSTOLIC BLOOD PRESSURE: 110 MMHG | HEIGHT: 67 IN | DIASTOLIC BLOOD PRESSURE: 71 MMHG | BODY MASS INDEX: 33.74 KG/M2 | WEIGHT: 215 LBS

## 2020-09-11 PROCEDURE — 99214 OFFICE O/P EST MOD 30 MIN: CPT

## 2020-09-11 NOTE — PHYSICAL EXAM
[General Appearance - Alert] : alert [General Appearance - In No Acute Distress] : in no acute distress [Person] : oriented to person [Place] : oriented to place [Time] : oriented to time [Cranial Nerves Optic (II)] : visual acuity intact bilaterally,  pupils equal round and reactive to light [Cranial Nerves Oculomotor (III)] : extraocular motion intact [Cranial Nerves Trigeminal (V)] : facial sensation intact symmetrically [Cranial Nerves Facial (VII)] : face symmetrical [Cranial Nerves Vestibulocochlear (VIII)] : hearing was intact bilaterally [Cranial Nerves Glossopharyngeal (IX)] : tongue and palate midline [Cranial Nerves Accessory (XI - Cranial And Spinal)] : head turning and shoulder shrug symmetric [Cranial Nerves Hypoglossal (XII)] : there was no tongue deviation with protrusion [Motor Tone] : muscle tone was normal in all four extremities [Motor Handedness Right-Handed] : the patient is right hand dominant [Sensation Tactile Decrease] : light touch was intact [PERRL With Normal Accommodation] : pupils were equal in size, round, reactive to light, with normal accommodation [Extraocular Movements] : extraocular movements were intact [Full Visual Field] : full visual field [Neck Appearance] : the appearance of the neck was normal [] : no respiratory distress [Abnormal Walk] : normal gait [Skin Color & Pigmentation] : normal skin color and pigmentation

## 2020-09-11 NOTE — HISTORY OF PRESENT ILLNESS
[FreeTextEntry1] : 44 year old female hx of HLD, anxiety, arthritis, and hypothyroid, noted to have thoracic mass near brachial plexus and spinal column during work-up for gastric bypass. 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 Lesion paraspinal, on Right, near T1-2. No focal deficits. \par Since her last visit she has lost over 100lbs from bariatric surgery. She does complain of radiating right arm pain intermittently down the back of her arm which sometimes prevents her from sleeping. \par \par She presents today with an MRI to review and discuss treatment options.

## 2020-09-11 NOTE — ASSESSMENT
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 The differential diagnosis includes lipoma, nerve sheath tumor, schwannoma, or liposarcoma. This has demonstrated stability on serial imaging. We will proceed with a follow up MRI thoracic spine in 1 year. She will return to see me Summer 2021. She was reassured regarding the benign nature of this mass. She understands to notify us with any new symptoms including weakness, numbness/tingling, or gait/balance changes.  I have explained all neurosurgical risks and benefits to the planned approach and the patient wishes to proceed at this time. \par

## 2020-10-19 ENCOUNTER — NON-APPOINTMENT (OUTPATIENT)
Age: 45
End: 2020-10-19

## 2020-11-01 NOTE — PLAN
[FreeTextEntry1] : PLAN: Follow up with pulmonary.\par            Continue with diet and exercise.\par            Blood work.\par            RTO in 3 months.\par            Call with concerns.
complains of pain/discomfort

## 2020-12-08 ENCOUNTER — EMERGENCY (EMERGENCY)
Facility: HOSPITAL | Age: 45
LOS: 0 days | Discharge: HOME | End: 2020-12-08
Attending: EMERGENCY MEDICINE | Admitting: EMERGENCY MEDICINE
Payer: COMMERCIAL

## 2020-12-08 VITALS
DIASTOLIC BLOOD PRESSURE: 95 MMHG | HEART RATE: 120 BPM | WEIGHT: 179.9 LBS | RESPIRATION RATE: 22 BRPM | TEMPERATURE: 98 F | OXYGEN SATURATION: 98 % | SYSTOLIC BLOOD PRESSURE: 141 MMHG | HEIGHT: 67 IN

## 2020-12-08 VITALS — HEART RATE: 83 BPM

## 2020-12-08 DIAGNOSIS — Z90.89 ACQUIRED ABSENCE OF OTHER ORGANS: Chronic | ICD-10-CM

## 2020-12-08 DIAGNOSIS — Z20.828 CONTACT WITH AND (SUSPECTED) EXPOSURE TO OTHER VIRAL COMMUNICABLE DISEASES: ICD-10-CM

## 2020-12-08 DIAGNOSIS — Z98.890 OTHER SPECIFIED POSTPROCEDURAL STATES: Chronic | ICD-10-CM

## 2020-12-08 DIAGNOSIS — Z98.51 TUBAL LIGATION STATUS: Chronic | ICD-10-CM

## 2020-12-08 DIAGNOSIS — F41.9 ANXIETY DISORDER, UNSPECIFIED: ICD-10-CM

## 2020-12-08 DIAGNOSIS — E03.9 HYPOTHYROIDISM, UNSPECIFIED: ICD-10-CM

## 2020-12-08 DIAGNOSIS — F32.9 MAJOR DEPRESSIVE DISORDER, SINGLE EPISODE, UNSPECIFIED: ICD-10-CM

## 2020-12-08 LAB
ALBUMIN SERPL ELPH-MCNC: 4.3 G/DL — SIGNIFICANT CHANGE UP (ref 3.5–5.2)
ALP SERPL-CCNC: 51 U/L — SIGNIFICANT CHANGE UP (ref 30–115)
ALT FLD-CCNC: 15 U/L — SIGNIFICANT CHANGE UP (ref 0–41)
ANION GAP SERPL CALC-SCNC: 19 MMOL/L — HIGH (ref 7–14)
APAP SERPL-MCNC: <5 UG/ML — LOW (ref 10–30)
AST SERPL-CCNC: 46 U/L — HIGH (ref 0–41)
BASOPHILS # BLD AUTO: 0.01 K/UL — SIGNIFICANT CHANGE UP (ref 0–0.2)
BASOPHILS NFR BLD AUTO: 0.2 % — SIGNIFICANT CHANGE UP (ref 0–1)
BILIRUB SERPL-MCNC: 0.6 MG/DL — SIGNIFICANT CHANGE UP (ref 0.2–1.2)
BUN SERPL-MCNC: 7 MG/DL — LOW (ref 10–20)
CALCIUM SERPL-MCNC: 9.1 MG/DL — SIGNIFICANT CHANGE UP (ref 8.5–10.1)
CHLORIDE SERPL-SCNC: 101 MMOL/L — SIGNIFICANT CHANGE UP (ref 98–110)
CO2 SERPL-SCNC: 18 MMOL/L — SIGNIFICANT CHANGE UP (ref 17–32)
CREAT SERPL-MCNC: 0.6 MG/DL — LOW (ref 0.7–1.5)
EOSINOPHIL # BLD AUTO: 0.05 K/UL — SIGNIFICANT CHANGE UP (ref 0–0.7)
EOSINOPHIL NFR BLD AUTO: 0.9 % — SIGNIFICANT CHANGE UP (ref 0–8)
ETHANOL SERPL-MCNC: <10 MG/DL — SIGNIFICANT CHANGE UP
GLUCOSE SERPL-MCNC: 86 MG/DL — SIGNIFICANT CHANGE UP (ref 70–99)
HCG SERPL QL: NEGATIVE — SIGNIFICANT CHANGE UP
HCT VFR BLD CALC: 36.9 % — LOW (ref 37–47)
HGB BLD-MCNC: 11.3 G/DL — LOW (ref 12–16)
IMM GRANULOCYTES NFR BLD AUTO: 0.4 % — HIGH (ref 0.1–0.3)
LYMPHOCYTES # BLD AUTO: 1.22 K/UL — SIGNIFICANT CHANGE UP (ref 1.2–3.4)
LYMPHOCYTES # BLD AUTO: 22.8 % — SIGNIFICANT CHANGE UP (ref 20.5–51.1)
MCHC RBC-ENTMCNC: 19.3 PG — LOW (ref 27–31)
MCHC RBC-ENTMCNC: 30.6 G/DL — LOW (ref 32–37)
MCV RBC AUTO: 63.2 FL — LOW (ref 81–99)
MONOCYTES # BLD AUTO: 0.28 K/UL — SIGNIFICANT CHANGE UP (ref 0.1–0.6)
MONOCYTES NFR BLD AUTO: 5.2 % — SIGNIFICANT CHANGE UP (ref 1.7–9.3)
NEUTROPHILS # BLD AUTO: 3.76 K/UL — SIGNIFICANT CHANGE UP (ref 1.4–6.5)
NEUTROPHILS NFR BLD AUTO: 70.5 % — SIGNIFICANT CHANGE UP (ref 42.2–75.2)
NRBC # BLD: 0 /100 WBCS — SIGNIFICANT CHANGE UP (ref 0–0)
PLATELET # BLD AUTO: 211 K/UL — SIGNIFICANT CHANGE UP (ref 130–400)
POTASSIUM SERPL-MCNC: 5.2 MMOL/L — HIGH (ref 3.5–5)
POTASSIUM SERPL-SCNC: 5.2 MMOL/L — HIGH (ref 3.5–5)
PROT SERPL-MCNC: 6.4 G/DL — SIGNIFICANT CHANGE UP (ref 6–8)
RAPID RVP RESULT: SIGNIFICANT CHANGE UP
RBC # BLD: 5.84 M/UL — HIGH (ref 4.2–5.4)
RBC # FLD: 19.1 % — HIGH (ref 11.5–14.5)
SALICYLATES SERPL-MCNC: <0.3 MG/DL — LOW (ref 4–30)
SARS-COV-2 RNA SPEC QL NAA+PROBE: SIGNIFICANT CHANGE UP
SODIUM SERPL-SCNC: 138 MMOL/L — SIGNIFICANT CHANGE UP (ref 135–146)
WBC # BLD: 5.34 K/UL — SIGNIFICANT CHANGE UP (ref 4.8–10.8)
WBC # FLD AUTO: 5.34 K/UL — SIGNIFICANT CHANGE UP (ref 4.8–10.8)

## 2020-12-08 PROCEDURE — 71045 X-RAY EXAM CHEST 1 VIEW: CPT | Mod: 26

## 2020-12-08 PROCEDURE — 99285 EMERGENCY DEPT VISIT HI MDM: CPT

## 2020-12-08 PROCEDURE — 93010 ELECTROCARDIOGRAM REPORT: CPT

## 2020-12-08 RX ORDER — SODIUM CHLORIDE 9 MG/ML
1000 INJECTION, SOLUTION INTRAVENOUS ONCE
Refills: 0 | Status: COMPLETED | OUTPATIENT
Start: 2020-12-08 | End: 2020-12-08

## 2020-12-08 RX ADMIN — SODIUM CHLORIDE 1000 MILLILITER(S): 9 INJECTION, SOLUTION INTRAVENOUS at 17:25

## 2020-12-08 NOTE — ED ADULT TRIAGE NOTE - CHIEF COMPLAINT QUOTE
Pt reports increasing anxiety, pt takes lexapro po and feels like it is not helping, She denies suicidal and homicidal ideation

## 2020-12-08 NOTE — ED BEHAVIORAL HEALTH ASSESSMENT NOTE - RISK ASSESSMENT
Low Acute Suicide Risk Patient has low suicide risk given protective factors including no prior history of SA, no current SI, intent or plan, no prior psychiatric hospitalization, no family hx of SA, future oriented, identifies reasons for living, willingness to follow up with established psychiatric care, help seeking behavior.

## 2020-12-08 NOTE — ED BEHAVIORAL HEALTH ASSESSMENT NOTE - HPI (INCLUDE ILLNESS QUALITY, SEVERITY, DURATION, TIMING, CONTEXT, MODIFYING FACTORS, ASSOCIATED SIGNS AND SYMPTOMS)
45F domiciled w/ 4 children, , employed, w/ pph depression, anxiety, illness anxiety disorder currently in outpatient txt (SAJI Tesfaye) weekly CBT therapy (Jacinda Khan), no prior IPP admission, no prior SA, w/ PMH bariatric surgery (3/2020), hypothyroidism, substance use history significant for recent daily cannabis use self presenting to the ED for worsening anxiety. Psychiatry consulted for mental health evaluation.     Upon evaluation, pt noted to be lying down on stretcher, napping. Patietn calm, cooperative, linear. States increasing stressors at home, including  being deployed to [place] 6 months ago. Reports feeling overhwhelmed, stating that she has 4 chidlren and dogs at home to take care. 45F domiciled w/ 4 children, , employed, w/ pph depression, anxiety, illness anxiety disorder currently in outpatient txt (SAJI Tesfaye) weekly CBT therapy (Jacinda Khan), no prior IPP admission, no prior SA, w/ PMH bariatric surgery (3/2020), hypothyroidism, substance use history significant for recent daily cannabis use self presenting to the ED for worsening anxiety. Psychiatry consulted for mental health evaluation.     Upon evaluation, pt noted to be lying down on stretcher, napping. Patient calm, cooperative, linear. States increasing stressors at home, including  being deployed to [place] 6 months ago. Reports feeling overwhelmed, stating that she has 4 children and dogs at home to take care. 45F domiciled w/ 4 children, , employed, w/ pph depression, anxiety, illness anxiety disorder currently in outpatient txt (SAJI Tesfaye) weekly CBT therapy (Jacinda Khan), no prior IPP admission, no prior SA, w/ PMH bariatric surgery (3/2020), hypothyroidism, substance use history significant for recent daily cannabis use self presenting to the ED for worsening anxiety. Psychiatry consulted for mental health evaluation.     Upon evaluation, pt noted to be lying down on stretcher, napping. Patient calm, cooperative, linear. Reports coming in the hospital, after having a verbal argument with her daughter. States she spoke with her therapist who had recommended that she come to the hospital for psychiatric evaluation as she hasn't been feeling "herself" though no acute safety concerns. Patient sates that she's been feeling more on edge, irritable w/ family, verbal argumentative w/ children, anxious w /inconsolable crying. States she's been smoking marijuana daily for the last week, which also is not normal for her, also reports increased spending on clothing. Patient expresses concerns that her symptoms may be due to her Lexapro. States that she has been on this dose 20mg, for the last year, but concerned that her significant weight loss due to bariatric surgery (145 lbs since March) may require a dose decrease. Pt does reports increased stressors at home, including  being deployed to [place] 6 months ago (works as a [employer]). Reports feeling overwhelmed, stating that she has 4 children and dogs at home to take care, also has a 22 y/o Autistic son who requires significant attention and care. Furthermore, mother is living in [place], who requires much care and attention. In addition to the increased spending, she reports not sleeping well, staying up at night watching "pimple poper" on youtube.  Also has been spending a lot of time cleaning her room, which is unusual as she is very messy. States that she has been able to continue to go to work and function, although her increased stress has impacted her functioning. Does not report any recent activity concerning for harm to self/others. States she's feeling a little bit better since she's been in the ED and received IV fluids, states that she may also have been dehydrated contributing to her mood. Reports that her  has decided to return home from [place] today to be with her and help her out at home. She adamantly denies SI/HI, intent or plan. Identifies multiple reason for living including her children. Denies AVH. No signs of psychosis elicited on exam.       Collateral obtained from  (Mark Jones) who does not report any acute safety concerns with patient returning home. States that patient has had "a lot of stress dealing w/ the children" and "a lot of pressure" but reports that her current presentation is "understandable" given the circumstances. Confirms that he returned to [place] and will be home until the end of December. Reports that if patient should develop any symptoms concerning for danger to others or self that he would call 911 or bring patient into the ED.

## 2020-12-08 NOTE — ED PROVIDER NOTE - CLINICAL SUMMARY MEDICAL DECISION MAKING FREE TEXT BOX
Pt in Er with c/o anxiety, feeling stressed out and overwhelmed.  no SI/HI.  pt seen and eval by psych cleared for d/c home, she will f/u with her therapist and outpt meds.  told to return to ER if she feels worse or for any other new/concerning symptoms.  pt understands and agrees with plan.

## 2020-12-08 NOTE — ED BEHAVIORAL HEALTH ASSESSMENT NOTE - REFERRAL / APPOINTMENT DETAILS
Patient has appointment with therapist Saturday 10am and has follow up with SAJI RANDLE Patient has appointment with therapist Saturday 10am and has follow up with psychiatric NP Mark Carrington ([phone]) in end of December.

## 2020-12-08 NOTE — ED PROVIDER NOTE - NSFOLLOWUPINSTRUCTIONS_ED_ALL_ED_FT
Anxiety    Generalized anxiety disorder (AZEB) is a mental disorder. It is defined as anxiety that is not necessarily related to specific events or activities or is out of proportion to said events. Symptoms include restlessness, fatigue, difficulty concentrations, irritability and difficulty concentrating. It interferes with life functions, including relationships, work, and school. If you were started on a medication make sure to take exactly as prescribed and follow up with a psychiatrist.    SEEK IMMEDIATE MEDICAL CARE IF YOU HAVE THE FOLLOWING SYMPTOMS: thoughts about hurting killing yourself, thoughts about hurting or killing somebody else, hallucinations, or worsening depression.    SAFETY PLAN-   Step 1: Identify 3 warning signs (thoughts, images, mood, situation, behavior) that a crisis may be developing	.  Warning Sign 1:	Physical changes (increase Heart rate, upset stomach, clenching fist)  Warning Sign 2:	less patience, short with people  Warning Sign 3:	increase in OCD behavior  Step 2: Identify 3 internal coping strategies - Things I can do to take my mind off my problems without contacting another person (relaxation technique, physical activity)	.  Internal Coping Strategy 1:	meditation  Internal Coping Strategy 2:	taking a break before engaging with others  Internal Coping Strategy 3:	watching television  Step 3: People and social settings that provide distraction	.  Name:	Mark Jones  Phone:	[phone]  Name:	Anna Mcmahon  Phone:	located in phone  Place:	[place]  Place:	[employer]  Step 4: People whom I can ask for help	.  Name:	Jacinda Sarah  Phone:	[phone]  Name:	Mark Jones  Phone:	[phone]  Name:	Dolly (grandmother)  Phone:	Located in phone  Step 5: Professionals or agencies I can contact during a crisis	.  Clinician Name:	Jacinda Smith  Phone:	[phone]  Clinician Name:	Mark Carrington  Phone:	[phone]  Suicide Prevention Lifeline Phone: 9-567-188-EJPD ([phone])  Step 6: Identify how to make the environment safe, Make up the bed to feel safe and warm and sleep with her dogs  The one thing that is most important to me and worth living for is:	Children

## 2020-12-08 NOTE — ED BEHAVIORAL HEALTH ASSESSMENT NOTE - OTHER PAST PSYCHIATRIC HISTORY (INCLUDE DETAILS REGARDING ONSET, COURSE OF ILLNESS, INPATIENT/OUTPATIENT TREATMENT)
no prior IPP, no prior SA, currently in o/p treatement, dx w/ depression, anxiety, illness anxiety disorder, h/o post partum depression    currently in weekly CBT therapy

## 2020-12-08 NOTE — ED PROVIDER NOTE - PATIENT PORTAL LINK FT
You can access the FollowMyHealth Patient Portal offered by [facility] by registering at the following website: [email or web address]. By joining [facility]’s FollowMyHealth portal, you will also be able to view your health information using other applications (apps) compatible with our system.

## 2020-12-08 NOTE — ED ADULT NURSE NOTE - OBJECTIVE STATEMENT
patient c/o increasing anxiety. states she takes lexapro but feels like it isnt helping as much anymore. denies any si/hi

## 2020-12-08 NOTE — ED PROVIDER NOTE - NSFOLLOWUPCLINICS_GEN_ALL_ED_FT
[facility]  OP Mental Health  [address] [place] [id number]  Phone: [phone]  Fax:   Follow Up Time: Routine

## 2020-12-08 NOTE — ED BEHAVIORAL HEALTH ASSESSMENT NOTE - DETAILS
weight gain- paxil afterhours mother w/ reported h/o Bipolar disorder not requiring hospitalization, substance use disorder in chart as above after-hours
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
## 2020-12-08 NOTE — ED BEHAVIORAL HEALTH ASSESSMENT NOTE - DIFFERENTIAL
depression, anxiety NOS  r/o MDD vs bipolar II vs bipolar w/ mixed features   r/o AZEB   Illness anxiety disorder

## 2020-12-08 NOTE — ED PROVIDER NOTE - PHYSICAL EXAMINATION
CONSTITUTIONAL: Well-developed; well-nourished.   SKIN: warm, dry  HEAD: Normocephalic; atraumatic.  EYES: no conjunctival injection. PERRLA. EOMI.   ENT: No nasal discharge; airway clear.  NECK: Supple; non tender.  CARD: S1, S2 normal; Tachycardic.   RESP: No wheezes, rales or rhonchi.  ABD: soft ntnd. No LE edema.   EXT: Normal ROM.  No LE edema. Distal pulses intact and symmetric.   LYMPH: No acute cervical adenopathy.  NEURO: Alert, oriented, grossly unremarkable. No FND. 5/5 strength and sensation to all extremities.   PSYCH: Cooperative. Pressured speech, anxious. Crying during interview.

## 2020-12-08 NOTE — ED PROVIDER NOTE - NS ED ROS FT
Review of Systems:  CONSTITUTIONAL: No fever, No diaphoresis   SKIN: No rash  HEMATOLOGIC: No abnormal bleeding or bruising  EYES: No eye pain, No blurred vision  ENT:  No sore throat, No neck pain, No rhinorrhea   RESPIRATORY: No shortness of breath, No cough  CARDIAC: No chest pain, No palpitations  GI: No abdominal pain, No nausea, No vomiting, No diarrhea, No constipation, No bright red blood per rectum or melena. No flank pain  : No dysuria, frequency, hematuria.   MUSCULOSKELETAL: No joint paint, No swelling, No back pain  NEUROLOGIC: No numbness, No focal weakness, No headache, No dizziness  PSYCH: Patient denies SI/HI, denies any self-harm attempt or OD, denies any other substance abuse/misuse, and no auditory hallucinations. +visual hallucinations.   All other systems negative, unless specified in HPI

## 2020-12-08 NOTE — ED PROVIDER NOTE - ATTENDING CONTRIBUTION TO CARE
46 y/o female with h/o anxiety, depression, hypothyroid, s/p gastric bypass, in ER with c/o feeling stressed out and anxious. She has 4 children at home including a child with special needs, her  has been deployed with the [employer] and is not at home to help.  She's feeling very overwhelmed, not sleeping well, arguing with her kids and family members, smoking marijuana daily.  Has been able to continue working.  Denies any SI/HI.  is taking lexapro, follows with a therapist.  Denies any recent injury or illness.  PE - nad, nc/at, eomi, perrl, op - clear, cta b/l, no w/r/r, rrr, abd - soft, nt/nd, nabs, from x 4, A&O x 3, no focal neuro deficits.  -psych eval.

## 2020-12-08 NOTE — ED BEHAVIORAL HEALTH ASSESSMENT NOTE - NSSUICPROTFACT_PSY_ALL_CORE
Responsibility to children, family, or others/Identifies reasons for living/Supportive social network of family or friends/Fear of death or the actual act of killing self

## 2020-12-08 NOTE — ED BEHAVIORAL HEALTH ASSESSMENT NOTE - SUMMARY
45F domiciled w/ 4 children, , employed, w/ pph depression, anxiety, illness anxiety disorder currently in outpatient txt (SAJI Tesfaye) weekly CBT therapy (Jacinda Khan), no prior IPP admission, no prior SA, w/ PMH bariatric surgery (3/2020), hypothyroidism, substance use history significant for recent daily cannabis use self presenting to the ED for worsening anxiety. Psychiatry consulted for mental health evaluation.     Upon evaluation, patient presenting with worsening anxiety, irritability, poor sleep and mood lability in the setting of multiple psychosocial stressors including limited support at home with recent deployment of , limited resources for 22 y/o Autistic son due to COVID, and increased interpersonal difficulties w/ daughters and family. Some symptoms concerning for hypomania including poor sleep, irritability, increased spending, and risky behaviors (recent increased cannabis use). However her current symptoms are not significantly impacting her functioning at work/home. She continues to be compliant with medication, psychiatric follow, and will have additional support at home with  returning from deployment today. At this time, she does not appear to be acutely decompensated. She is not acutely suicidal, homicidal, manic or psychotic, thus would not warrant nor would she benefit from involuntary psychiatric hospitalization.

## 2020-12-11 ENCOUNTER — APPOINTMENT (OUTPATIENT)
Dept: SURGERY | Facility: CLINIC | Age: 45
End: 2020-12-11
Payer: COMMERCIAL

## 2020-12-11 VITALS — HEIGHT: 67 IN | WEIGHT: 182 LBS | BODY MASS INDEX: 28.56 KG/M2 | TEMPERATURE: 97.2 F

## 2020-12-11 DIAGNOSIS — M54.9 DORSALGIA, UNSPECIFIED: ICD-10-CM

## 2020-12-11 DIAGNOSIS — G89.29 DORSALGIA, UNSPECIFIED: ICD-10-CM

## 2020-12-11 PROCEDURE — 99072 ADDL SUPL MATRL&STAF TM PHE: CPT

## 2020-12-11 PROCEDURE — 99213 OFFICE O/P EST LOW 20 MIN: CPT

## 2020-12-11 RX ORDER — BIOTIN 10 MG
TABLET ORAL DAILY
Refills: 0 | Status: ACTIVE | COMMUNITY

## 2020-12-11 RX ORDER — OMEGA-3/DHA/EPA/FISH OIL 300-1000MG
CAPSULE ORAL DAILY
Refills: 0 | Status: ACTIVE | COMMUNITY

## 2020-12-11 RX ORDER — URSODIOL 300 MG/1
300 CAPSULE ORAL
Qty: 60 | Refills: 5 | Status: DISCONTINUED | COMMUNITY
Start: 2020-03-31 | End: 2020-12-11

## 2020-12-11 RX ORDER — COLD-HOT PACK
EACH MISCELLANEOUS DAILY
Refills: 0 | Status: ACTIVE | COMMUNITY

## 2020-12-11 NOTE — ADDENDUM
[FreeTextEntry1] : Plan of care reviewed and I agree with the assessment; note amended as appropriate.\par Social stress noted; patient to f/u with mental health provider

## 2020-12-11 NOTE — HISTORY OF PRESENT ILLNESS
[Procedure: ___] : Procedure performed: [unfilled]  [Date of Surgery: ___] : Date of Surgery:   [unfilled] [Pre-Op Weight ___] : Pre-op weight was [unfilled] lbs [___ Months Post Op] : [unfilled] months [de-identified] : Patient had surgery approximately 9 months ago. She is not doing well with her anxiety as her  was deployed and she is working full time and helping out with virtual learning. Strongly encouraged patient to follow up with her psychiatrist as her medication might need to be adjusted. She c/o of an occasional " pinching" pain around her umbilicus. Patient is s/p umbilical hernia repair with mesh and will monitor.\par She denies heartburn/reflux/vomiting and food intolerance. Her constipation resolved. \par Anxiety has worsened since the pandemic and back pain improved. She is not using her CPAP machine. Discussed the risks of GRECIA and recommend that she follows up with pulmonary. She has not done any blood work since surgery. Discussed the importance of regular testing to detect nutritional and vitamin deficeinces.\par \par

## 2020-12-11 NOTE — PHYSICAL EXAM
[Normal] : affect appropriate [de-identified] : Soft, nondistended. Well healed incisions. No hernia palpated.

## 2020-12-11 NOTE — ASSESSMENT
[FreeTextEntry1] : MAHI KEBEDE is a 44 year female s/p RYGB on 3/3/20. Patient is doing well with her weight loss goals.\par Patient is compliant with dietary guidelines and has not made any significant changes to he menu.\par She eats 3 meals/day. Breakfast - 1/2 low fat English muffin with cheese/fruit or Special K cereal. Lunch - low fat spinach and yogurt dip with carrots or mixed green salad with Feta cheese or grilled chicken. Dinner - marinated chicken, steak with green leafy vegetables and a small serving of potato. Snack - blueberries, peanut butter or Greek yogurt. \par Fluid intake is adequate with mainly warm teas, sugar free ice pops or water. She is still tolerating warm liquids better than cold liquids.\par She is riding 5-6 miles 3 days/week and she is also walking or doing a light jog for 30-45 minutes. She added weight bearing exercises and squats.\par

## 2020-12-11 NOTE — PLAN
[FreeTextEntry1] : Plan: Blood work asap - no blood work since surgery.\par         Continue with behavioral modification.\par         Follow up with pulmonary. \par         RTO in 3 months.\par         Call with concerns.

## 2020-12-11 NOTE — DATA REVIEWED
[FreeTextEntry1] : Wt. change since last visit: -33 lbs\par %EWL: 77\par TWL: 142 lbs\par BMI: 28\par \par

## 2021-01-01 NOTE — ASU PATIENT PROFILE, ADULT - MEDICATIONS BROUGHT TO HOSPITAL, PROFILE
Date of Birth: [birth year]	Time of Birth:     Admission Weight (g): 1565    Admission Date and Time:  03-10-21 @ 23:23         Gestational Age: 31.2     Source of admission [ x ] Inborn     [ __ ]Transport from    [facility]: Neonatologist was requested to come STAT to L&D 7 for a  of a 31.2 week GA female born to a 42y/o  mom admitted for IOL secondary to PEC with severe features.  She had + PNC, is O neg (received Rhogam ), HIV neg, HBsAg neg, RPR NR, Rubella IMM, GBS Unk, GDM on metformin.  L&D: Admitted for IOL secondary to PEC with severe features. She was treated with Labetalol and Mag Sulfate. Received betamethasone 3/9-3/10.  AROM aprox 2 hrs PTD.  She received Ampicillin X2 PTD for Unk GBS.  Baby born vertex, transferred to warmer, placed on warming mattress, orally suctioned, dried, and stimulated.  Baby with good cry initially but then became apneic, stimulated and started on CPAP 5 30% FIO2.  FIO2 adjusted to obtain target sats.  Infant showed to father and then transferred to NICU for further evaluation and management on CPAP.   Temp:  37.3C    Social History: No history of alcohol/tobacco exposure obtained  FHx: non-contributory to the condition being treated or details of FH documented here  ROS: unable to obtain ()     PHYSICAL EXAM:    General:	 Awake and active;   Head:		AFOF  Eyes:		Normally set bilaterally, RR ++ OU  Ears:		Patent bilaterally, no deformities  Nose/Mouth:	Nares patent, palate intact  Neck:		No masses, intact clavicles  Chest/Lungs:      Breath sounds equal to auscultation.   CV:		N S1S2, no murmur  Abdomen:          Soft nontender nondistended, no masses, bowel sounds present  :		Normal for gestational age  Back:		Intact skin, no sacral dimples or tags  Anus:		Grossly patent  Extremities:	FROM, no hip clicks  Skin:		Pink, no lesions  Neuro exam:	Appropriate tone, activity    **************************************************************************************************  Age:31d    LOS:31d    Vital Signs:  T(C): 36.9 (04-10 @ 05:00), Max: 37 ( @ 08:00)  HR: 151 (04-10 @ 05:00) (148 - 170)  BP: 60/32 ( @ 20:00) (60/32 - 60/32)  RR: 52 (04-10 @ 05:00) (42 - 58)  SpO2: 100% (04-10 @ 05:00) (96% - 100%)      LABS:                                        0   0 )-----------( 0             [:52]                  31.7  S 0%  B 0%  Matlock 0%  Myelo 0%  Promyelo 0%  Blasts 0%  Lymph 0%  Mono 0%  Eos 0%  Baso 0%  Retic 2.3%                        15.3   16.10 )-----------( 392             [ @ 05:11]                  43.2  S 0%  B 0%  Matlock 0%  Myelo 0%  Promyelo 0%  Blasts 0%  Lymph 0%  Mono 0%  Eos 0%  Baso 0%  Retic 1.0%        N/A  |N/A  | 12.0   ------------------<N/A  Ca 9.9  Mg N/A  Ph 6.7   [:52]  N/A   | N/A  | N/A         N/A  |N/A  | N/A    ------------------<N/A  Ca 10.2 Mg N/A  Ph 7.0   [ @ 05:11]  N/A   | N/A  | N/A               Alkaline Phosphatase []  363, Alkaline Phosphatase []  337  Albumin [] 3.3, Albumin [] 3.7                             CAPILLARY BLOOD GLUCOSE          ferrous sulfate Oral Liquid - Peds 4.1 milliGRAM(s) Elemental Iron daily  multivitamin Oral Drops - Peds 1 milliLiter(s) daily      RESPIRATORY SUPPORT:  [ _ ] Mechanical Ventilation:   [ _ ] Nasal Cannula: _ __ _ Liters, FiO2: ___ %  [ x ]RA    **************************************************************************************************		  DISCHARGE PLANNING (date and status):  Hep B Vacc:  21  CCHD:	passed		  :					  Hearing:    screen: 3/11, 3/13 (on TPN), 	  Circumcision: N/A  Hip  rec: N/A  	  Synagis:  No			  Other Immunizations (with dates):    		  Neurodevelop eval:    NRE: 7;  EI: No;  F/U in 6 months  CPR class done?  	  PVS at DC? yes  Vit D at DC?	  FE at DC?	yes    PMD:          Name:  ______________ _             Contact information:  ______________ _  Pharmacy: Name:  ______________ _              Contact information:  ______________ _    Follow-up appointments (list):  PMD in 1-2 days  ND in 6 months  [facility]  Ophtho in 2 weeks      Time spent on the total subsequent encounter with >50% of the visit spent on counseling and/or coordination of care:[ _ ] 15 min[ _ ] 25 min[ _ ] 35 min  [ _ ] Discharge time spent >30 min   [ __ ] Car seat oximetry reviewed. no

## 2021-01-08 DIAGNOSIS — E03.9 HYPOTHYROIDISM, UNSPECIFIED: ICD-10-CM

## 2021-01-15 ENCOUNTER — OUTPATIENT (OUTPATIENT)
Dept: OUTPATIENT SERVICES | Facility: HOSPITAL | Age: 46
LOS: 1 days | Discharge: HOME | End: 2021-01-15

## 2021-01-15 ENCOUNTER — APPOINTMENT (OUTPATIENT)
Dept: PSYCHIATRY | Facility: CLINIC | Age: 46
End: 2021-01-15

## 2021-01-15 DIAGNOSIS — F41.1 GENERALIZED ANXIETY DISORDER: ICD-10-CM

## 2021-01-15 DIAGNOSIS — Z98.51 TUBAL LIGATION STATUS: Chronic | ICD-10-CM

## 2021-01-15 DIAGNOSIS — Z98.890 OTHER SPECIFIED POSTPROCEDURAL STATES: Chronic | ICD-10-CM

## 2021-01-15 DIAGNOSIS — F43.10 POST-TRAUMATIC STRESS DISORDER, UNSPECIFIED: ICD-10-CM

## 2021-01-15 DIAGNOSIS — Z90.89 ACQUIRED ABSENCE OF OTHER ORGANS: Chronic | ICD-10-CM

## 2021-01-15 DIAGNOSIS — D64.9 ANEMIA, UNSPECIFIED: ICD-10-CM

## 2021-02-11 ENCOUNTER — APPOINTMENT (OUTPATIENT)
Dept: OBGYN | Facility: CLINIC | Age: 46
End: 2021-02-11

## 2021-03-05 ENCOUNTER — APPOINTMENT (OUTPATIENT)
Dept: SURGERY | Facility: CLINIC | Age: 46
End: 2021-03-05

## 2021-04-05 NOTE — ED ADULT NURSE NOTE - CAS EDP DISCH TYPE
LOV: 12/17/2020 with Doc IVETTE Hoyos  RTC: 6 months  Last Relevant Labs: 12/17/2020   Filled: 3/4/2021  #120 with 0 refills    No future appointments.
Refill needed [facility] #[id number]    HYDROcodone-acetaminophen 5-325 MG Oral Tab
Home

## 2021-06-17 NOTE — DISCHARGE NOTE NURSING/CASE MANAGEMENT/SOCIAL WORK - NSTRANSFERBELONGINGSDISPO_GEN_A_NUR
Problem List Items Addressed This Visit        Circulatory    Benign hypertensive heart disease with heart failure (CMS/HCC)     HYPERTENSION...  control at present:  BP controlled   medication plan:  No change.   Lifestyle management:  regular aerobic exercise, limit alcohol  Diet:  low sodium  discussed at length           Chronic diastolic heart failure (CMS/HCC)     CHF improving w/ higher dose lasix  Echo done at [facility] 12/2020  Is on dig, lasix, BB, ACE  Weigh self daily  Low sodium diet  As per cardiology    Recheck BMP             Endocrine    Controlled type 2 diabetes mellitus with diabetic nephropathy, without long-term current use of insulin (CMS/[facility])     Control at present:  Ideal since resuming metformin, but losing wt  Daily aerobic exercise as tolerated.   Watch diet.  Daily aspirin 81 mg.  Daily foot care.  Annual eye exam up to date.    Check sugar once daily, alternating time between fasting before breakfast and 2 hrs after the main meal.  Keep a log of your sugars with two columns reflecting the two different times of readings.  Change in management:  Decrease metformin to 500 mg once daily in view of wt loss.   Discussed at length.       Pioglitazone can be considered, but given CHF hx, is relatively contraindicated.          Relevant Orders    BASIC METABOLIC PANEL       Other    Weight loss, non-intentional - Primary     Down 13 lbs unintentionally  Can be aggravated by metformin, but off the metformin, the sugar went up off the med  Discussed trying other DM meds, but daughter feels comfortable w/ metformin  In view of wt loss, decrease metformin to 500 once daily  Continue mirtazepine 15 mg daily  Continue ensure and encourage food intake  Will continue to monitor           Relevant Orders    CBC WITH DIFFERENTIAL           with patient

## 2021-06-19 NOTE — ASU PATIENT PROFILE, ADULT - ACCEPTABLE
Letter by Tito Salazar MD at      Author: Tito Salazar MD Service: -- Author Type: --    Filed:  Encounter Date: 11/5/2019 Status: Signed         Eileen Donald  [address] [place] [address]      November 14, 2019      Dear Ms. Donald,    You are currently under the care of [facility] for your warfarin (Coumadin ) therapy.  We are contacting you because our records show you were due for an INR on 10/29/2019.    There are potentially serious risks when taking warfarin without careful monitoring and we want to make sure you are safely managed.  Routine INR monitoring is required for warfarin refills.     Please call [phone] as soon as possible to schedule an appointment.  If there has been a change in your care or other concerns, please let us know so we can help and or update our records.     Sincerely,       [facility]               
0

## 2021-07-01 NOTE — DATA REVIEWED
[de-identified] : I have reviewed the most recent MRI 8/18/20 at [facility] [place] which shows a stable T1-2 right paraspinal nonenhancing  lesion measuring 3.6 X 1.5 X 1.9 cm consistent with liopma
hypertension

## 2021-07-13 ENCOUNTER — APPOINTMENT (OUTPATIENT)
Dept: SURGERY | Facility: CLINIC | Age: 46
End: 2021-07-13
Payer: COMMERCIAL

## 2021-07-13 VITALS — TEMPERATURE: 97.7 F | WEIGHT: 155 LBS | BODY MASS INDEX: 24.33 KG/M2 | HEIGHT: 67 IN

## 2021-07-13 DIAGNOSIS — F41.9 ANXIETY DISORDER, UNSPECIFIED: ICD-10-CM

## 2021-07-13 DIAGNOSIS — Z99.89 OBSTRUCTIVE SLEEP APNEA (ADULT) (PEDIATRIC): ICD-10-CM

## 2021-07-13 DIAGNOSIS — G47.33 OBSTRUCTIVE SLEEP APNEA (ADULT) (PEDIATRIC): ICD-10-CM

## 2021-07-13 DIAGNOSIS — E66.9 OBESITY, UNSPECIFIED: ICD-10-CM

## 2021-07-13 PROCEDURE — 99212 OFFICE O/P EST SF 10 MIN: CPT

## 2021-07-13 RX ORDER — LEVOTHYROXINE SODIUM 0.05 MG/1
50 TABLET ORAL DAILY
Refills: 0 | Status: DISCONTINUED | COMMUNITY
Start: 2019-07-30 | End: 2021-07-13

## 2021-07-13 NOTE — DATA REVIEWED
[FreeTextEntry1] : Wt. change since last visit: -27 lbs\par %EWL: 92\par TWL: 169 lbs\par \par partial blood work reviewed from her PCP's office. She will add a mvi + iron, vitamin b 12 daily and calcium + d daily. She has not had a complete bariatric blood panel since her surgery. This has been an ongoing discussion with patient.

## 2021-07-13 NOTE — PLAN
[FreeTextEntry1] : Plan: Add vitamins as recommended.\par         Continue with meal planning and exercise.\par         Follow up with pulmonary.\par         RTO in October.\par         Call with concerns. \par \par

## 2021-07-13 NOTE — HISTORY OF PRESENT ILLNESS
[Procedure: ___] : Procedure performed: [unfilled]  [Date of Surgery: ___] : Date of Surgery:   [unfilled] [Pre-Op Weight ___] : Pre-op weight was [unfilled] lbs [___ Years Post Op] : [unfilled] years [de-identified] : Patient had surgery approximately 16 months ago. Patient states that she is having some inner ear problems and plans to have an MRI.\par She denies heartburn/reflux/vomiting. She has poor tolerance to chicken. Her constipation resolved. She stopped her vitamins for a while after she ran out. Discussed the importance of compliance to prevent deficiencies.\par Anxiety has stabilized and she continues to work with her therapist. She is not using her CPAP machine. Discussed the risks of GRECIA and recommend that she follows up with pulmonary.

## 2021-07-13 NOTE — ASSESSMENT
[FreeTextEntry1] : MAHI EKBEDE is a 46 year female seen today for bariatric follow up visit. Patient is doing extremely well with her weight loss goals.\par Patient is compliant with dietary guidelines and she is now catering food on the weekends.\par Breakfast - cottage cheese or poached eggs with an occasional slice of turkey callaway. Lunch - vegetable beef soup or chick pea pasta with vegetables/steak. Dinner - steak, shrimp, tuna grilled squash,zucchini, broccoli and spinach.\par Fluid intake is adequate with unsweetened iced tea, hot teas and water\par She is walking daily for 45 minutes and she added resistant training exercises as she is suffering from a right shoulder injury.\par \par \par \par \par \par \par

## 2021-07-13 NOTE — REASON FOR VISIT
[Follow-Up Visit] : a follow-up visit for [S/P Bariatric Surgery] : s/p bariatric surgery [FreeTextEntry2] : RYGB on 3/3/2020

## 2021-07-23 NOTE — DATA REVIEWED
[de-identified] : I have reviewed the most recent MRI 7/20/21 at [facility] [place] which shows a stable T1-2 right paraspinal nonenhancing  lesion measuring 3.6 X 1.5 X 1.9 cm consistent with lipoma

## 2021-07-23 NOTE — ASSESSMENT
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 The differential diagnosis includes lipoma, nerve sheath tumor, schwannoma, or liposarcoma. This has demonstrated stability on serial imaging. We will proceed with a follow up MRI thoracic spine in 1 year. She will return to see me Summer 2022. She was reassured regarding the benign nature of this mass. She understands to notify us with any new symptoms including weakness, numbness/tingling, or gait/balance changes.  I have explained all neurosurgical risks and benefits to the planned approach and the patient wishes to proceed at this time. \par

## 2021-07-23 NOTE — HISTORY OF PRESENT ILLNESS
[FreeTextEntry1] : 44 year old female hx of HLD, anxiety, arthritis, and hypothyroid, noted to have thoracic mass near brachial plexus and spinal column during work-up for gastric bypass.. \par \par Since her last visit she has lost over 100lbs from bariatric surgery. She does complain of radiating right arm pain intermittently down the back of her arm which sometimes prevents her from sleeping. \par \par She presents today with an MRI to review and discuss treatment options.

## 2021-07-29 NOTE — DATA REVIEWED
[de-identified] : I have reviewed the most recent MRI 7/20/21 at [facility] [place] which shows a stable T1-2 right paraspinal nonenhancing  lesion measuring 3.6 X 1.5 X 1.9 cm consistent with lipoma

## 2021-07-30 ENCOUNTER — APPOINTMENT (OUTPATIENT)
Dept: NEUROSURGERY | Facility: CLINIC | Age: 46
End: 2021-07-30

## 2021-08-02 ENCOUNTER — APPOINTMENT (OUTPATIENT)
Dept: NEUROSURGERY | Facility: CLINIC | Age: 46
End: 2021-08-02

## 2021-08-06 ENCOUNTER — TRANSCRIPTION ENCOUNTER (OUTPATIENT)
Age: 46
End: 2021-08-06

## 2021-08-18 ENCOUNTER — OUTPATIENT (OUTPATIENT)
Dept: OUTPATIENT SERVICES | Facility: HOSPITAL | Age: 46
LOS: 1 days | Discharge: HOME | End: 2021-08-18
Payer: COMMERCIAL

## 2021-08-18 DIAGNOSIS — Z98.890 OTHER SPECIFIED POSTPROCEDURAL STATES: Chronic | ICD-10-CM

## 2021-08-18 DIAGNOSIS — Z90.89 ACQUIRED ABSENCE OF OTHER ORGANS: Chronic | ICD-10-CM

## 2021-08-18 DIAGNOSIS — H91.91 UNSPECIFIED HEARING LOSS, RIGHT EAR: ICD-10-CM

## 2021-08-18 DIAGNOSIS — Z98.51 TUBAL LIGATION STATUS: Chronic | ICD-10-CM

## 2021-08-18 PROCEDURE — 70553 MRI BRAIN STEM W/O & W/DYE: CPT | Mod: 26,76

## 2021-10-06 ENCOUNTER — OUTPATIENT (OUTPATIENT)
Dept: OUTPATIENT SERVICES | Facility: HOSPITAL | Age: 46
LOS: 1 days | Discharge: HOME | End: 2021-10-06

## 2021-10-06 ENCOUNTER — APPOINTMENT (OUTPATIENT)
Dept: NEUROPSYCHOLOGY | Facility: CLINIC | Age: 46
End: 2021-10-06

## 2021-10-06 DIAGNOSIS — Z98.890 OTHER SPECIFIED POSTPROCEDURAL STATES: Chronic | ICD-10-CM

## 2021-10-06 DIAGNOSIS — Z98.51 TUBAL LIGATION STATUS: Chronic | ICD-10-CM

## 2021-10-06 DIAGNOSIS — Z90.89 ACQUIRED ABSENCE OF OTHER ORGANS: Chronic | ICD-10-CM

## 2021-10-07 DIAGNOSIS — G31.84 MILD COGNITIVE IMPAIRMENT OF UNCERTAIN OR UNKNOWN ETIOLOGY: ICD-10-CM

## 2021-10-11 NOTE — H&P PST ADULT - PAIN SCALE PREFERRED, PROFILE
Assessment & Plan   Patient is new to me.  Patient is a 79-year-old female with history of liver transplant.  She has a Hx of spinal  stenosis treated with local steroid injection.  Today she is doing about right hip pain and knee pain started several months ago.  No recent trauma or injury  Exam is unremarkable.  Evidence of laxity.  I suspect osteoarthritis.  Will obtain x-ray for right knee and right hip for further evaluation.  I recommend physical therapy.  If no improvement we can interfere for local steroid injection.  Right knee pain, unspecified chronicity    - XR Knee Right 3 Views; Future  - Physical Therapy Referral; Future    Hip pain, right    - XR Hip Right 2-3 Views; Future  - Physical Therapy Referral; Future    Spinal stenosis of lumbar region, unspecified whether neurogenic claudication present  Spinal stenosis noted on MRI lumbar in 2004.  Patient states she never seen by a spine specialist.  She would like referral to spine specialist.  - Spine Referral; Future                 Return in about 3 months (around 1/11/2022) for Follow up, in person.    Giovanni Ly MD  [facility]   donna is a 79 year old who presents for the following health issues     HPI     Musculoskeletal problem/pain  Onset/Duration: few months  Description  Location: hip, knee - right  Joint Swelling: only if on feet all day, ankle swelling  Redness: no  Pain: YES  Warmth: no  Intensity:  moderate  Progression of Symptoms:  worsening  Accompanying signs and symptoms:   Fevers: no  Numbness/tingling/weakness: YES- numbness in toes  History  Trauma to the area: no   Recent illness:  no  Previous similar problem: no  Previous evaluation:  no  Precipitating or alleviating factors:  Aggravating factors include: none  Therapies tried and outcome: ice    Itchiness on scalp      Hx of spinal  stenosis treated with local steroid injection   Reports right hip and knee pain.   Review of  Systems   Constitutional, HEENT, cardiovascular, pulmonary, gi and gu systems are negative, except as otherwise noted.      Objective    BP (!) 178/80   Pulse 77   Temp 98.2  F (36.8  C) (Tympanic)   Wt 62.6 kg (138 lb)   SpO2 99%   BMI 26.95 kg/m    Body mass index is 26.95 kg/m .  Physical Exam  Vitals and nursing note reviewed.   Constitutional:       General: She is not in acute distress.     Appearance: Normal appearance. She is not ill-appearing, toxic-appearing or diaphoretic.   Musculoskeletal:      Right hip: No deformity, lacerations or tenderness. Normal range of motion. Normal strength.      Left hip: No deformity, lacerations or tenderness. Normal range of motion. Normal strength.      Right knee: No swelling, deformity, effusion, erythema, ecchymosis or lacerations. Normal range of motion. No tenderness. Normal alignment and normal meniscus.      Instability Tests: Anterior drawer test negative. Posterior drawer test negative. Medial Jasson test negative and lateral Jasson test negative.      Left knee: No swelling, deformity, effusion, erythema, ecchymosis, lacerations or bony tenderness. Normal range of motion. No tenderness. Normal alignment and normal meniscus.      Instability Tests: Anterior drawer test negative. Posterior drawer test negative. Anterior Lachman test negative. Medial Jasson test negative and lateral Jasson test negative.   Neurological:      Mental Status: She is alert.                         numerical 0-10

## 2021-10-12 ENCOUNTER — APPOINTMENT (OUTPATIENT)
Dept: SURGERY | Facility: CLINIC | Age: 46
End: 2021-10-12

## 2021-10-20 ENCOUNTER — APPOINTMENT (OUTPATIENT)
Dept: NEUROPSYCHOLOGY | Facility: CLINIC | Age: 46
End: 2021-10-20

## 2021-10-27 ENCOUNTER — APPOINTMENT (OUTPATIENT)
Dept: NEUROPSYCHOLOGY | Facility: CLINIC | Age: 46
End: 2021-10-27

## 2021-10-27 NOTE — ED ADULT TRIAGE NOTE - CCCP TRG CHIEF CMPLNT
Continuity of Care Form    Patient Name: Maud Boeck   :  1952  MRN:  [id number]    Admit date:  10/21/2021  Discharge date:  10-    Code Status Order: Full Code   Advance Directives:      Admitting Physician:  Scottie Smith MD  PCP: Elijah Galloway MD    Discharging Nurse: [facility] Unit/Room#: 6119/5451-27  Discharging Unit Phone Number: [phone]    Emergency Contact:   Extended Emergency Contact Information  Primary Emergency Contact: LeilaniRachael  Address: [address] Phone: [phone]  Relation: Other  Secondary Emergency Contact:  Nette Diaz  Mobile Phone: [phone]  Relation: Brother/Sister  Preferred language: Keely Campos    Past Surgical History:  Past Surgical History:   Procedure Laterality Date    CHOLECYSTECTOMY N/A 2013    LAPAROSCOPIC CONVERTED TO OPEN CHOLECYSTECTOMY    ERCP N/A 10/20/2021    ERCP SPHINCTER/PAPILLOTOMY performed by Len Puentes MD at [facility] ERCP  10/20/2021    ERCP STONE REMOVAL performed by Len Puentes MD at [facility] FEMPOP ART UNI STENT ATHER W WO PTA      OTHER SURGICAL HISTORY  10/20/2021     ERCP SPHINCTER/PAPILLOTOMY (N/A )     PROSTATECTOMY      robotic       Immunization History:   Immunization History   Administered Date(s) Administered    COVID-19, Pfizer, PF, 30mcg/0.3mL 2021, 2021, 10/05/2021    Pneumococcal Conjugate 13-valent ([id number]) 2017    Pneumococcal Polysaccharide ([id number]) 2018    Td, unspecified formulation 2013    Tdap (Boostrix, Adacel) 2021       Active Problems:  Patient Active Problem List   Diagnosis Code    Esophageal reflux K21.9    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    Mixed hyperlipidemia E78.2    Personal history of prostate cancer Z85.46    Rosacea L71.9    Thoracic or lumbosacral neuritis or radiculitis, unspecified [id number]    Hip pain M25.559    PVD (peripheral vascular disease) ([facility]) I73.9    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    Chronic right shoulder pain M25.511, G89.29    Acromioclavicular joint arthritis M19.019    Rotator cuff tendinitis M75.80    Biceps tendinitis of right shoulder M75.21    Bursitis of shoulder, right M75.51    Left lateral epicondylitis M77.12    Chronic bilateral low back pain without sciatica M54.50, G89.29    Prostate cancer ([facility] [address] C61    Acute pancreatitis without infection or necrosis K85.90       Isolation/Infection:   Isolation            No Isolation          Patient Infection Status       Infection Onset Added Last Indicated Last Indicated By Review Planned Expiration Resolved Resolved By    None active    Resolved    COVID-19 Rule Out 10/14/21 10/14/21 10/14/21 COVID-19 (Ordered)   10/15/21 Rule-Out Test Resulted            Nurse Assessment:  Last Vital Signs: BP (!) 158/89   Pulse 62   Temp 97.9 °F (36.6 °C) (Oral)   Resp 15   SpO2 97%     Last documented pain score (0-10 scale): Pain Level: 0  Last Weight:   Wt Readings from Last 1 Encounters:   10/20/21 135 lb (61.2 kg)     Mental Status:  oriented and alert    IV Access:  - None    Nursing Mobility/ADLs:  Walking   Assisted with walker  Transfer  Independent  Bathing  Independent  Dressing  Independent  Toileting  Independent  Feeding  Independent  Med Admin  Independent  Med Delivery   whole    Wound Care Documentation and Therapy:        Elimination:  Continence:   · Bowel: Yes  · Bladder: Yes  Urinary Catheter: None   Colostomy/Ileostomy/Ileal Conduit: No       Date of Last BM:     Intake/Output Summary (Last 24 hours) at 10/27/2021 0901  Last data filed at 10/27/2021 0557  Gross per 24 hour   Intake 860 ml   Output 1100 ml   Net -240 ml     I/O last 3 completed shifts: In: 860 [P.O.:510; I.V.:350]  Out: 1990 [Urine:1990]    Safety Concerns:      At Risk for Falls    Impairments/Disabilities: arm pain/injury Vision/Hearing    Nutrition Therapy:  Current Nutrition Therapy:   - Oral Diet:  Low Fat    Routes of Feeding: Oral  Liquids: Thin Liquids  Daily Fluid Restriction: no  Last Modified Barium Swallow with Video (Video Swallowing Test): not done    Treatments at the Time of Hospital Discharge:   Respiratory Treatments:   Oxygen Therapy:  is not on home oxygen therapy.   Ventilator:    - No ventilator support    Rehab Therapies: Physical Therapy, Occupational Therapy, [address]: LEVEL 1 STANDARD    Home health agency to establish plan of care for patient over 60 day period   Nursing  Initial home SN evaluation visit to occur within 24-48 hours for:  medication management  VS and clinical assessment  S&S chronic disease exacerbation education + when to contact MD / NP  care coordination  Medication Reconciliation during 1st SN visit       PT/OT   Evaluate with goal of regaining prior level of functioning   OT to evaluate if patient has [address] needs for personal care      PCP Visit scheduled within 7 days of hospital discharge       Weight Bearing Status/Restrictions: No weight bearing restirctions  Other Medical Equipment (for information only, NOT a DME order):  walker  Other Treatments:     Patient's personal belongings (please select all that are sent with patient):  Hearing Aides bilateral    RN SIGNATURE:  Electronically signed by Fartun Fuller, TIFFANY on 10/27/21 at 10:08 AM EDT    CASE MANAGEMENT/SOCIAL WORK SECTION    Inpatient Status Date: ***    Readmission Risk Assessment Score:  Readmission Risk              Risk of Unplanned Readmission:  12           Discharging to Facility/ Agency   Name:  [facility] care    Address: [address] [place] [address]  Phone: [phone]  Fax: [phone]    / signature: {Esignature:[id number]:::0}    PHYSICIAN SECTION    Prognosis: Fair    Condition at Discharge: Stable    Rehab Potential (if transferring to Rehab): Fair    Recommended Labs or Other Treatments After Discharge: pt/ follow up with pcp/ low fat diet    Physician Certification: I certify the above information and transfer of Isidoro Tony  is necessary for the continuing treatment of the diagnosis listed and that he requires [address] for less 30 days.      Update Admission H&P: No change in H&P    PHYSICIAN SIGNATURE:  Electronically signed by Zoltan Correa MD on 10/27/21 at 9:01 AM EDT

## 2021-12-10 ENCOUNTER — TRANSCRIPTION ENCOUNTER (OUTPATIENT)
Age: 46
End: 2021-12-10

## 2021-12-15 DIAGNOSIS — G31.84 MILD COGNITIVE IMPAIRMENT OF UNCERTAIN OR UNKNOWN ETIOLOGY: ICD-10-CM

## 2022-01-05 ENCOUNTER — APPOINTMENT (OUTPATIENT)
Dept: NEUROPSYCHOLOGY | Facility: CLINIC | Age: 47
End: 2022-01-05

## 2022-08-17 ENCOUNTER — APPOINTMENT (OUTPATIENT)
Dept: NEUROPSYCHOLOGY | Facility: CLINIC | Age: 47
End: 2022-08-17

## 2022-08-23 ENCOUNTER — OUTPATIENT (OUTPATIENT)
Dept: OUTPATIENT SERVICES | Facility: HOSPITAL | Age: 47
LOS: 1 days | End: 2022-08-23
Payer: COMMERCIAL

## 2022-08-23 ENCOUNTER — APPOINTMENT (OUTPATIENT)
Dept: MRI IMAGING | Facility: HOSPITAL | Age: 47
End: 2022-08-23

## 2022-08-23 DIAGNOSIS — Z90.89 ACQUIRED ABSENCE OF OTHER ORGANS: Chronic | ICD-10-CM

## 2022-08-23 DIAGNOSIS — Z98.890 OTHER SPECIFIED POSTPROCEDURAL STATES: Chronic | ICD-10-CM

## 2022-08-23 DIAGNOSIS — Z98.51 TUBAL LIGATION STATUS: Chronic | ICD-10-CM

## 2022-08-23 PROCEDURE — 72157 MRI CHEST SPINE W/O & W/DYE: CPT | Mod: 26

## 2022-08-23 PROCEDURE — 72157 MRI CHEST SPINE W/O & W/DYE: CPT

## 2022-08-30 ENCOUNTER — APPOINTMENT (OUTPATIENT)
Dept: OBGYN | Facility: CLINIC | Age: 47
End: 2022-08-30

## 2022-08-30 VITALS — BODY MASS INDEX: 25.84 KG/M2 | DIASTOLIC BLOOD PRESSURE: 62 MMHG | SYSTOLIC BLOOD PRESSURE: 100 MMHG | WEIGHT: 165 LBS

## 2022-08-30 DIAGNOSIS — Z87.42 PERSONAL HISTORY OF OTHER DISEASES OF THE FEMALE GENITAL TRACT: ICD-10-CM

## 2022-08-30 DIAGNOSIS — B37.3 CANDIDIASIS OF VULVA AND VAGINA: ICD-10-CM

## 2022-08-30 DIAGNOSIS — Z01.411 ENCOUNTER FOR GYNECOLOGICAL EXAMINATION (GENERAL) (ROUTINE) WITH ABNORMAL FINDINGS: ICD-10-CM

## 2022-08-30 DIAGNOSIS — N92.6 IRREGULAR MENSTRUATION, UNSPECIFIED: ICD-10-CM

## 2022-08-30 DIAGNOSIS — N85.2 HYPERTROPHY OF UTERUS: ICD-10-CM

## 2022-08-30 PROCEDURE — 99213 OFFICE O/P EST LOW 20 MIN: CPT | Mod: 25

## 2022-08-30 PROCEDURE — 99396 PREV VISIT EST AGE 40-64: CPT

## 2022-08-30 RX ORDER — FLUCONAZOLE 150 MG/1
150 TABLET ORAL DAILY
Qty: 3 | Refills: 0 | Status: ACTIVE | COMMUNITY
Start: 2022-08-30 | End: 1900-01-01

## 2022-08-30 NOTE — DISCUSSION/SUMMARY
[FreeTextEntry1] : Pap done\par Self breast exam stressed\par Prescribed yearly bilateral screening mammogram\par Prescribed pelvic ultrasound\par Prescribed hormone profile\par Prescribed Diflucan\par Keep menstrual calendar\par Follow-up yearly or as needed

## 2022-08-30 NOTE — PHYSICAL EXAM
[Appropriately responsive] : appropriately responsive [Alert] : alert [No Acute Distress] : no acute distress [No Lymphadenopathy] : no lymphadenopathy [Regular Rate Rhythm] : regular rate rhythm [No Murmurs] : no murmurs [Clear to Auscultation B/L] : clear to auscultation bilaterally [Soft] : soft [Non-tender] : non-tender [Non-distended] : non-distended [No HSM] : No HSM [No Lesions] : no lesions [No Mass] : no mass [Oriented x3] : oriented x3 [Examination Of The Breasts] : a normal appearance [No Masses] : no breast masses were palpable [Labia Majora] : normal [Labia Minora] : normal [Tenderness] : tenderness [Discharge] : a  ~M vaginal discharge was present [Normal] : normal [Enlarged ___ wks] : enlarged [unfilled] ~Uweeks [Anteversion] : anteverted [Uterine Adnexae] : normal

## 2022-08-30 NOTE — HISTORY OF PRESENT ILLNESS
[FreeTextEntry1] : Patient is 47 years old para 4-0-0-4 last menstrual period August 21, 2022.\par Patient states that she notes irregular menses.  She has a history of enlarged uterus.  She also complains of recurrent vaginal discharge.

## 2022-09-06 ENCOUNTER — APPOINTMENT (OUTPATIENT)
Dept: NEUROPSYCHOLOGY | Facility: CLINIC | Age: 47
End: 2022-09-06

## 2022-09-07 NOTE — CHART NOTE - NSCHARTNOTESELECT_GEN_ALL_CORE
C- spine cleared by Dr. Vernon. C-collar removed by this RN.     Pt ambulated to bathroom and back to bed with even and steady gait. Pt reports she feels better, but still has some pain in the middle of her back. Pt denies any other needs at this time.    Transfer Note

## 2022-09-09 ENCOUNTER — APPOINTMENT (OUTPATIENT)
Dept: NEUROSURGERY | Facility: CLINIC | Age: 47
End: 2022-09-09

## 2022-09-09 ENCOUNTER — NON-APPOINTMENT (OUTPATIENT)
Age: 47
End: 2022-09-09

## 2022-09-09 VITALS
BODY MASS INDEX: 25.43 KG/M2 | TEMPERATURE: 97.8 F | HEIGHT: 67 IN | OXYGEN SATURATION: 99 % | HEART RATE: 85 BPM | SYSTOLIC BLOOD PRESSURE: 123 MMHG | WEIGHT: 162 LBS | DIASTOLIC BLOOD PRESSURE: 75 MMHG

## 2022-09-09 DIAGNOSIS — H93.A9 PULSATILE TINNITUS, UNSPECIFIED EAR: ICD-10-CM

## 2022-09-09 PROCEDURE — 99214 OFFICE O/P EST MOD 30 MIN: CPT

## 2022-09-09 RX ORDER — BUSPIRONE HCL 100 %
POWDER (GRAM) MISCELLANEOUS
Refills: 0 | Status: ACTIVE | COMMUNITY

## 2022-09-09 NOTE — ASSESSMENT
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 The differential diagnosis includes lipoma, nerve sheath tumor, schwannoma, or liposarcoma. This has demonstrated stability on serial imaging. We will proceed with a follow up MRI thoracic spine in 6 months. She will return to see me March 2023. She was reassured regarding the benign nature of this mass. We will present her case at our tumor board 9/19. She understands to notify us with any new symptoms including weakness, numbness/tingling, or gait/balance changes.  I have explained all neurosurgical risks and benefits to the planned approach and the patient wishes to proceed at this time. \par

## 2022-09-09 NOTE — HISTORY OF PRESENT ILLNESS
[FreeTextEntry1] : 47 year old female hx of HLD, anxiety, arthritis, and hypothyroid, noted to have thoracic mass near brachial plexus and spinal column during work-up for gastric bypass. 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 Lesion paraspinal, on Right, near T1-2. No focal deficits. \par She does report occasional severe pain when her right shoulder is touched in a certain way.\par She acknowledges some right sided weakness/clumsiness to her right leg.\par \par She presents today with an MRI to review and discuss treatment options.

## 2022-09-09 NOTE — PHYSICAL EXAM
[General Appearance - Alert] : alert [General Appearance - In No Acute Distress] : in no acute distress [Person] : oriented to person [Place] : oriented to place [Time] : oriented to time [Motor Tone] : muscle tone was normal in all four extremities [Motor Strength] : muscle strength was normal in all four extremities [Motor Handedness Left-Handed] : the patient is left hand dominant [Sensation Tactile Decrease] : light touch was intact [Abnormal Walk] : normal gait [Balance] : balance was intact

## 2022-09-14 ENCOUNTER — APPOINTMENT (OUTPATIENT)
Dept: NEUROPSYCHOLOGY | Facility: CLINIC | Age: 47
End: 2022-09-14

## 2022-09-14 ENCOUNTER — OUTPATIENT (OUTPATIENT)
Dept: OUTPATIENT SERVICES | Facility: HOSPITAL | Age: 47
LOS: 1 days | Discharge: HOME | End: 2022-09-14

## 2022-09-14 DIAGNOSIS — Z90.89 ACQUIRED ABSENCE OF OTHER ORGANS: Chronic | ICD-10-CM

## 2022-09-14 DIAGNOSIS — G31.84 MILD COGNITIVE IMPAIRMENT OF UNCERTAIN OR UNKNOWN ETIOLOGY: ICD-10-CM

## 2022-09-14 DIAGNOSIS — Z98.51 TUBAL LIGATION STATUS: Chronic | ICD-10-CM

## 2022-09-14 DIAGNOSIS — Z98.890 OTHER SPECIFIED POSTPROCEDURAL STATES: Chronic | ICD-10-CM

## 2022-09-19 ENCOUNTER — NON-APPOINTMENT (OUTPATIENT)
Age: 47
End: 2022-09-19

## 2022-10-18 ENCOUNTER — APPOINTMENT (OUTPATIENT)
Dept: SURGERY | Facility: CLINIC | Age: 47
End: 2022-10-18

## 2022-10-18 VITALS — HEIGHT: 67 IN | BODY MASS INDEX: 24.96 KG/M2 | WEIGHT: 159 LBS

## 2022-10-18 DIAGNOSIS — S39.011A STRAIN OF MUSCLE, FASCIA AND TENDON OF ABDOMEN, INITIAL ENCOUNTER: ICD-10-CM

## 2022-10-18 DIAGNOSIS — R10.33 PERIUMBILICAL PAIN: ICD-10-CM

## 2022-10-18 PROCEDURE — 99213 OFFICE O/P EST LOW 20 MIN: CPT

## 2022-10-18 NOTE — PHYSICAL EXAM
[JVD] : no jugular venous distention  [Normal Breath Sounds] : Normal breath sounds [No Rash or Lesion] : No rash or lesion [Alert] : alert [Calm] : calm [de-identified] : Healthy [de-identified] : Normal [de-identified] : Soft and flat abdomen [de-identified] : No periumbilical hernia recurrence [de-identified] : Mild to moderate excess abdominal skin

## 2022-10-18 NOTE — ASSESSMENT
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 Olguin in 2019 followed by a Curtis-en-Y gastric bypass in early March 2020 by Dr. Portillo (she lost 185 pounds) who presents to the office with approximately 3 to 4 weeks of right periumbilical pain suspicious for a recurrence.  She occasionally does heavy lifting and strenuous activity both at work and at home.\par \par Physical examination demonstrates a well-healed scar with no evidence of hernia recurrence or delayed wound complications in the periumbilical region.  She does have some mild tenderness in the right periumbilical region which is likely related to abdominal muscle wall strain.  She does have a mild to moderate amount of excess abdominal skin due to her significant weight loss.  She does have a mild diastases recti which is likely related to her 4 previous full-term pregnancies in the past and her previous excess abdominal weight and this is of no significant clinical concern at this time.  Her current BMI is now 25.\par \par Love was counseled and reassured.  I believe she sustained a significant abdominal wall muscle strain due to overexertion and I recommended alternating ice/heat therapy and nonsteroidal anti-inflammatory medication for the next few weeks, and avoiding strenuous physical activity whenever possible during that time frame.  I believe wearing an abdominal binder at work and during exercise will minimize further injury and help her muscle strain heal in a timely fashion.  She was encouraged to return to me in the future if these symptoms persist or worsen, of course.  She was given a referral to our plastic surgeons for possible panniculectomy in the future.

## 2022-10-18 NOTE — CONSULT LETTER
[Dear  ___] : Dear  [unfilled], [Courtesy Letter:] : I had the pleasure of seeing your patient, [unfilled], in my office today. [Please see my note below.] : Please see my note below. [Consult Closing:] : Thank you very much for allowing me to participate in the care of this patient.  If you have any questions, please do not hesitate to contact me. [FreeTextEntry3] : Respectfully,\par \par Fernando Hoang M.D., FACS\par

## 2022-11-05 ENCOUNTER — NON-APPOINTMENT (OUTPATIENT)
Age: 47
End: 2022-11-05

## 2023-02-05 ENCOUNTER — NON-APPOINTMENT (OUTPATIENT)
Age: 48
End: 2023-02-05

## 2023-02-10 RX ORDER — DIAZEPAM 5 MG/1
5 TABLET ORAL
Qty: 2 | Refills: 0 | Status: ACTIVE | COMMUNITY
Start: 2022-08-23 | End: 1900-01-01

## 2023-06-19 ENCOUNTER — NON-APPOINTMENT (OUTPATIENT)
Age: 48
End: 2023-06-19

## 2023-07-05 ENCOUNTER — INPATIENT (INPATIENT)
Facility: HOSPITAL | Age: 48
LOS: 4 days | Discharge: HOME | DRG: 369 | End: 2023-07-10
Attending: INTERNAL MEDICINE | Admitting: INTERNAL MEDICINE
Payer: COMMERCIAL

## 2023-07-05 VITALS
HEART RATE: 72 BPM | SYSTOLIC BLOOD PRESSURE: 98 MMHG | TEMPERATURE: 98 F | WEIGHT: 160.06 LBS | DIASTOLIC BLOOD PRESSURE: 48 MMHG | OXYGEN SATURATION: 100 % | RESPIRATION RATE: 18 BRPM

## 2023-07-05 DIAGNOSIS — Z90.89 ACQUIRED ABSENCE OF OTHER ORGANS: Chronic | ICD-10-CM

## 2023-07-05 DIAGNOSIS — B37.89 OTHER SITES OF CANDIDIASIS: ICD-10-CM

## 2023-07-05 DIAGNOSIS — Z98.890 OTHER SPECIFIED POSTPROCEDURAL STATES: Chronic | ICD-10-CM

## 2023-07-05 DIAGNOSIS — Z98.51 TUBAL LIGATION STATUS: Chronic | ICD-10-CM

## 2023-07-05 LAB
ALBUMIN SERPL ELPH-MCNC: 4 G/DL — SIGNIFICANT CHANGE UP (ref 3.5–5.2)
ALP SERPL-CCNC: 38 U/L — SIGNIFICANT CHANGE UP (ref 30–115)
ALT FLD-CCNC: 11 U/L — SIGNIFICANT CHANGE UP (ref 0–41)
ANION GAP SERPL CALC-SCNC: 8 MMOL/L — SIGNIFICANT CHANGE UP (ref 7–14)
ANISOCYTOSIS BLD QL: SIGNIFICANT CHANGE UP
APTT BLD: 31.4 SEC — SIGNIFICANT CHANGE UP (ref 27–39.2)
AST SERPL-CCNC: 26 U/L — SIGNIFICANT CHANGE UP (ref 0–41)
BASOPHILS # BLD AUTO: 0.04 K/UL — SIGNIFICANT CHANGE UP (ref 0–0.2)
BASOPHILS # BLD AUTO: 0.05 K/UL — SIGNIFICANT CHANGE UP (ref 0–0.2)
BASOPHILS NFR BLD AUTO: 0.9 % — SIGNIFICANT CHANGE UP (ref 0–1)
BASOPHILS NFR BLD AUTO: 1.2 % — HIGH (ref 0–1)
BILIRUB SERPL-MCNC: <0.2 MG/DL — SIGNIFICANT CHANGE UP (ref 0.2–1.2)
BLD GP AB SCN SERPL QL: SIGNIFICANT CHANGE UP
BUN SERPL-MCNC: 12 MG/DL — SIGNIFICANT CHANGE UP (ref 10–20)
CALCIUM SERPL-MCNC: 8.3 MG/DL — LOW (ref 8.4–10.5)
CHLORIDE SERPL-SCNC: 101 MMOL/L — SIGNIFICANT CHANGE UP (ref 98–110)
CO2 SERPL-SCNC: 26 MMOL/L — SIGNIFICANT CHANGE UP (ref 17–32)
CREAT SERPL-MCNC: 0.5 MG/DL — LOW (ref 0.7–1.5)
DACRYOCYTES BLD QL SMEAR: SLIGHT — SIGNIFICANT CHANGE UP
EGFR: 116 ML/MIN/1.73M2 — SIGNIFICANT CHANGE UP
ELLIPTOCYTES BLD QL SMEAR: SLIGHT — SIGNIFICANT CHANGE UP
EOSINOPHIL # BLD AUTO: 0.3 K/UL — SIGNIFICANT CHANGE UP (ref 0–0.7)
EOSINOPHIL # BLD AUTO: 0.34 K/UL — SIGNIFICANT CHANGE UP (ref 0–0.7)
EOSINOPHIL NFR BLD AUTO: 7 % — SIGNIFICANT CHANGE UP (ref 0–8)
EOSINOPHIL NFR BLD AUTO: 8 % — SIGNIFICANT CHANGE UP (ref 0–8)
FERRITIN SERPL-MCNC: 3 NG/ML — LOW (ref 15–150)
FOLATE SERPL-MCNC: >20 NG/ML — SIGNIFICANT CHANGE UP
GLUCOSE SERPL-MCNC: 92 MG/DL — SIGNIFICANT CHANGE UP (ref 70–99)
HCT VFR BLD CALC: 21.6 % — LOW (ref 37–47)
HCT VFR BLD CALC: 22.4 % — LOW (ref 37–47)
HCT VFR BLD CALC: 27.1 % — LOW (ref 37–47)
HGB BLD-MCNC: 5.4 G/DL — CRITICAL LOW (ref 12–16)
HGB BLD-MCNC: 5.5 G/DL — CRITICAL LOW (ref 12–16)
HGB BLD-MCNC: 7.3 G/DL — LOW (ref 12–16)
HYPOCHROMIA BLD QL: SIGNIFICANT CHANGE UP
IMM GRANULOCYTES NFR BLD AUTO: 0.2 % — SIGNIFICANT CHANGE UP (ref 0.1–0.3)
IMM GRANULOCYTES NFR BLD AUTO: 0.2 % — SIGNIFICANT CHANGE UP (ref 0.1–0.3)
INR BLD: 1.03 RATIO — SIGNIFICANT CHANGE UP (ref 0.65–1.3)
IRON SATN MFR SERPL: 22 UG/DL — LOW (ref 35–150)
IRON SATN MFR SERPL: 6 % — LOW (ref 15–50)
LACTATE SERPL-SCNC: 1.1 MMOL/L — SIGNIFICANT CHANGE UP (ref 0.7–2)
LYMPHOCYTES # BLD AUTO: 1.38 K/UL — SIGNIFICANT CHANGE UP (ref 1.2–3.4)
LYMPHOCYTES # BLD AUTO: 1.46 K/UL — SIGNIFICANT CHANGE UP (ref 1.2–3.4)
LYMPHOCYTES # BLD AUTO: 32.2 % — SIGNIFICANT CHANGE UP (ref 20.5–51.1)
LYMPHOCYTES # BLD AUTO: 34.2 % — SIGNIFICANT CHANGE UP (ref 20.5–51.1)
MANUAL SMEAR VERIFICATION: YES — SIGNIFICANT CHANGE UP
MCHC RBC-ENTMCNC: 12.3 PG — LOW (ref 27–31)
MCHC RBC-ENTMCNC: 12.5 PG — LOW (ref 27–31)
MCHC RBC-ENTMCNC: 15 PG — LOW (ref 27–31)
MCHC RBC-ENTMCNC: 24.6 G/DL — LOW (ref 32–37)
MCHC RBC-ENTMCNC: 25 G/DL — LOW (ref 32–37)
MCHC RBC-ENTMCNC: 26.9 G/DL — LOW (ref 32–37)
MCV RBC AUTO: 50 FL — LOW (ref 81–99)
MCV RBC AUTO: 50.1 FL — LOW (ref 81–99)
MCV RBC AUTO: 55.8 FL — LOW (ref 81–99)
MICROCYTES BLD QL: SLIGHT — SIGNIFICANT CHANGE UP
MONOCYTES # BLD AUTO: 0.26 K/UL — SIGNIFICANT CHANGE UP (ref 0.1–0.6)
MONOCYTES # BLD AUTO: 0.29 K/UL — SIGNIFICANT CHANGE UP (ref 0.1–0.6)
MONOCYTES NFR BLD AUTO: 6.1 % — SIGNIFICANT CHANGE UP (ref 1.7–9.3)
MONOCYTES NFR BLD AUTO: 6.8 % — SIGNIFICANT CHANGE UP (ref 1.7–9.3)
NEUTROPHILS # BLD AUTO: 2.16 K/UL — SIGNIFICANT CHANGE UP (ref 1.4–6.5)
NEUTROPHILS # BLD AUTO: 2.25 K/UL — SIGNIFICANT CHANGE UP (ref 1.4–6.5)
NEUTROPHILS NFR BLD AUTO: 50.6 % — SIGNIFICANT CHANGE UP (ref 42.2–75.2)
NEUTROPHILS NFR BLD AUTO: 52.6 % — SIGNIFICANT CHANGE UP (ref 42.2–75.2)
NRBC # BLD: 0 /100 WBCS — SIGNIFICANT CHANGE UP (ref 0–0)
OVALOCYTES BLD QL SMEAR: SLIGHT — SIGNIFICANT CHANGE UP
PLAT MORPH BLD: NORMAL — SIGNIFICANT CHANGE UP
PLATELET # BLD AUTO: 231 K/UL — SIGNIFICANT CHANGE UP (ref 130–400)
PLATELET # BLD AUTO: 240 K/UL — SIGNIFICANT CHANGE UP (ref 130–400)
PLATELET # BLD AUTO: 252 K/UL — SIGNIFICANT CHANGE UP (ref 130–400)
PLATELET CLUMP BLD QL SMEAR: SIGNIFICANT CHANGE UP
PLATELET COUNT - ESTIMATE: NORMAL — SIGNIFICANT CHANGE UP
PMV BLD: SIGNIFICANT CHANGE UP (ref 7.4–10.4)
POTASSIUM SERPL-MCNC: 4.1 MMOL/L — SIGNIFICANT CHANGE UP (ref 3.5–5)
POTASSIUM SERPL-SCNC: 4.1 MMOL/L — SIGNIFICANT CHANGE UP (ref 3.5–5)
PROT SERPL-MCNC: 5.6 G/DL — LOW (ref 6–8)
PROTHROM AB SERPL-ACNC: 11.8 SEC — SIGNIFICANT CHANGE UP (ref 9.95–12.87)
RBC # BLD: 4.31 M/UL — SIGNIFICANT CHANGE UP (ref 4.2–5.4)
RBC # BLD: 4.31 M/UL — SIGNIFICANT CHANGE UP (ref 4.2–5.4)
RBC # BLD: 4.48 M/UL — SIGNIFICANT CHANGE UP (ref 4.2–5.4)
RBC # BLD: 4.86 M/UL — SIGNIFICANT CHANGE UP (ref 4.2–5.4)
RBC # FLD: 24.3 % — HIGH (ref 11.5–14.5)
RBC # FLD: 24.6 % — HIGH (ref 11.5–14.5)
RBC # FLD: 32.8 % — HIGH (ref 11.5–14.5)
RBC BLD AUTO: ABNORMAL
RETICS #: 34.9 K/UL — SIGNIFICANT CHANGE UP (ref 25–125)
RETICS/RBC NFR: 0.8 % — SIGNIFICANT CHANGE UP (ref 0.5–1.5)
SCHISTOCYTES BLD QL AUTO: SLIGHT — SIGNIFICANT CHANGE UP
SODIUM SERPL-SCNC: 135 MMOL/L — SIGNIFICANT CHANGE UP (ref 135–146)
TIBC SERPL-MCNC: 364 UG/DL — SIGNIFICANT CHANGE UP (ref 220–430)
TROPONIN T SERPL-MCNC: <0.01 NG/ML — SIGNIFICANT CHANGE UP
UIBC SERPL-MCNC: 342 UG/DL — SIGNIFICANT CHANGE UP (ref 110–370)
VIT B12 SERPL-MCNC: 323 PG/ML — SIGNIFICANT CHANGE UP (ref 232–1245)
WBC # BLD: 4.27 K/UL — LOW (ref 4.8–10.8)
WBC # BLD: 4.28 K/UL — LOW (ref 4.8–10.8)
WBC # BLD: 5.59 K/UL — SIGNIFICANT CHANGE UP (ref 4.8–10.8)
WBC # FLD AUTO: 4.27 K/UL — LOW (ref 4.8–10.8)
WBC # FLD AUTO: 4.28 K/UL — LOW (ref 4.8–10.8)
WBC # FLD AUTO: 5.59 K/UL — SIGNIFICANT CHANGE UP (ref 4.8–10.8)

## 2023-07-05 PROCEDURE — 84484 ASSAY OF TROPONIN QUANT: CPT

## 2023-07-05 PROCEDURE — 36415 COLL VENOUS BLD VENIPUNCTURE: CPT

## 2023-07-05 PROCEDURE — 87077 CULTURE AEROBIC IDENTIFY: CPT

## 2023-07-05 PROCEDURE — 80048 BASIC METABOLIC PNL TOTAL CA: CPT

## 2023-07-05 PROCEDURE — 83735 ASSAY OF MAGNESIUM: CPT

## 2023-07-05 PROCEDURE — 81025 URINE PREGNANCY TEST: CPT

## 2023-07-05 PROCEDURE — 76830 TRANSVAGINAL US NON-OB: CPT

## 2023-07-05 PROCEDURE — 87389 HIV-1 AG W/HIV-1&-2 AB AG IA: CPT

## 2023-07-05 PROCEDURE — 85025 COMPLETE CBC W/AUTO DIFF WBC: CPT

## 2023-07-05 PROCEDURE — C9113: CPT

## 2023-07-05 PROCEDURE — 71046 X-RAY EXAM CHEST 2 VIEWS: CPT | Mod: 26

## 2023-07-05 PROCEDURE — C1751: CPT

## 2023-07-05 PROCEDURE — 99285 EMERGENCY DEPT VISIT HI MDM: CPT

## 2023-07-05 PROCEDURE — 76856 US EXAM PELVIC COMPLETE: CPT

## 2023-07-05 PROCEDURE — 99223 1ST HOSP IP/OBS HIGH 75: CPT

## 2023-07-05 PROCEDURE — 84100 ASSAY OF PHOSPHORUS: CPT

## 2023-07-05 PROCEDURE — 87070 CULTURE OTHR SPECIMN AEROBIC: CPT

## 2023-07-05 PROCEDURE — 80053 COMPREHEN METABOLIC PANEL: CPT

## 2023-07-05 PROCEDURE — 93010 ELECTROCARDIOGRAM REPORT: CPT

## 2023-07-05 PROCEDURE — 84443 ASSAY THYROID STIM HORMONE: CPT

## 2023-07-05 PROCEDURE — 36573 INSJ PICC RS&I 5 YR+: CPT

## 2023-07-05 PROCEDURE — 85027 COMPLETE CBC AUTOMATED: CPT

## 2023-07-05 PROCEDURE — 93307 TTE W/O DOPPLER COMPLETE: CPT

## 2023-07-05 PROCEDURE — 36430 TRANSFUSION BLD/BLD COMPNT: CPT

## 2023-07-05 PROCEDURE — 87102 FUNGUS ISOLATION CULTURE: CPT

## 2023-07-05 PROCEDURE — 87536 HIV-1 QUANT&REVRSE TRNSCRPJ: CPT

## 2023-07-05 PROCEDURE — 99222 1ST HOSP IP/OBS MODERATE 55: CPT

## 2023-07-05 PROCEDURE — 84439 ASSAY OF FREE THYROXINE: CPT

## 2023-07-05 PROCEDURE — P9016: CPT

## 2023-07-05 PROCEDURE — 75574 CT ANGIO HRT W/3D IMAGE: CPT

## 2023-07-05 RX ORDER — OMEPRAZOLE 10 MG/1
0 CAPSULE, DELAYED RELEASE ORAL
Qty: 90 | Refills: 0 | DISCHARGE

## 2023-07-05 RX ORDER — ESCITALOPRAM OXALATE 10 MG/1
1 TABLET, FILM COATED ORAL
Qty: 0 | Refills: 0 | DISCHARGE

## 2023-07-05 RX ORDER — CLONAZEPAM 1 MG
0.5 TABLET ORAL
Refills: 0 | DISCHARGE

## 2023-07-05 RX ORDER — CASPOFUNGIN ACETATE 7 MG/ML
INJECTION, POWDER, LYOPHILIZED, FOR SOLUTION INTRAVENOUS
Refills: 0 | Status: DISCONTINUED | OUTPATIENT
Start: 2023-07-05 | End: 2023-07-10

## 2023-07-05 RX ORDER — GABAPENTIN 400 MG/1
0 CAPSULE ORAL
Qty: 50 | Refills: 0 | DISCHARGE

## 2023-07-05 RX ORDER — LANOLIN ALCOHOL/MO/W.PET/CERES
3 CREAM (GRAM) TOPICAL AT BEDTIME
Refills: 0 | Status: DISCONTINUED | OUTPATIENT
Start: 2023-07-05 | End: 2023-07-10

## 2023-07-05 RX ORDER — LEVOTHYROXINE SODIUM 125 MCG
0 TABLET ORAL
Qty: 90 | Refills: 0 | DISCHARGE

## 2023-07-05 RX ORDER — CASPOFUNGIN ACETATE 7 MG/ML
50 INJECTION, POWDER, LYOPHILIZED, FOR SOLUTION INTRAVENOUS EVERY 24 HOURS
Refills: 0 | Status: DISCONTINUED | OUTPATIENT
Start: 2023-07-06 | End: 2023-07-10

## 2023-07-05 RX ORDER — ACETAMINOPHEN 500 MG
650 TABLET ORAL EVERY 6 HOURS
Refills: 0 | Status: DISCONTINUED | OUTPATIENT
Start: 2023-07-05 | End: 2023-07-10

## 2023-07-05 RX ORDER — CHLORHEXIDINE GLUCONATE 213 G/1000ML
1 SOLUTION TOPICAL
Refills: 0 | Status: DISCONTINUED | OUTPATIENT
Start: 2023-07-05 | End: 2023-07-10

## 2023-07-05 RX ORDER — PANTOPRAZOLE SODIUM 20 MG/1
40 TABLET, DELAYED RELEASE ORAL
Refills: 0 | Status: DISCONTINUED | OUTPATIENT
Start: 2023-07-05 | End: 2023-07-06

## 2023-07-05 RX ORDER — CLONAZEPAM 1 MG
0.25 TABLET ORAL DAILY
Refills: 0 | Status: DISCONTINUED | OUTPATIENT
Start: 2023-07-05 | End: 2023-07-09

## 2023-07-05 RX ORDER — SODIUM CHLORIDE 9 MG/ML
2300 INJECTION, SOLUTION INTRAVENOUS ONCE
Refills: 0 | Status: COMPLETED | OUTPATIENT
Start: 2023-07-05 | End: 2023-07-05

## 2023-07-05 RX ORDER — CASPOFUNGIN ACETATE 7 MG/ML
70 INJECTION, POWDER, LYOPHILIZED, FOR SOLUTION INTRAVENOUS ONCE
Refills: 0 | Status: COMPLETED | OUTPATIENT
Start: 2023-07-05 | End: 2023-07-05

## 2023-07-05 RX ORDER — ONDANSETRON 8 MG/1
4 TABLET, FILM COATED ORAL EVERY 8 HOURS
Refills: 0 | Status: DISCONTINUED | OUTPATIENT
Start: 2023-07-05 | End: 2023-07-10

## 2023-07-05 RX ADMIN — PANTOPRAZOLE SODIUM 40 MILLIGRAM(S): 20 TABLET, DELAYED RELEASE ORAL at 11:43

## 2023-07-05 RX ADMIN — Medication 0.25 MILLIGRAM(S): at 23:19

## 2023-07-05 RX ADMIN — SODIUM CHLORIDE 2300 MILLILITER(S): 9 INJECTION, SOLUTION INTRAVENOUS at 08:14

## 2023-07-05 RX ADMIN — Medication 10 MILLIGRAM(S): at 18:44

## 2023-07-05 RX ADMIN — CASPOFUNGIN ACETATE 260 MILLIGRAM(S): 7 INJECTION, POWDER, LYOPHILIZED, FOR SOLUTION INTRAVENOUS at 14:27

## 2023-07-05 NOTE — H&P ADULT - ASSESSMENT
Pt is a 47yo F with a pmhx obesity, gastric bypass, PUD, anxiety disorder, candida esophagitis after being on prolonged steroids for hearing loss whom reports being first diagnosed with candida esophagitis by egd 9 months ago, follows with Dr Gomez and has received several prolonged course of diflucan with intermittent improvement in sx. Pt followed up with Dr Conway last week due to recurring sx of thrush and now has chest pain and so was recommended to go ed for fungal iv abx therapy and rule out lung infection. Pt denies associated fevers, chills, dysphagia, odynophagia, sob, melena, or hematochezia. Pt reports chest pain as constant, 4/10, sternal area, no aggravating or alleviating factors.    #candida esophagitis, thrush  -throat cx  -iv antifungal therapy  -ID consult  -GI consult  -d/w DR Barnhart    #anemia, r/o GIB, hx of PUD or possibly from esophagitis   -trend hgb  -transfuse 2u prbc  -IV PPI bid  -GI consult  -IVF    #anxiety disorder  -c/w clonazepam and buspar    #DVT prophylaxis  -SCD only and ambulate, pt low risk for DVT and high risk for bleeding so avoid anticoagulants Pt is a 47yo F with a pmhx obesity, gastric bypass, PUD, anxiety disorder, candida esophagitis after being on prolonged steroids for hearing loss whom reports being first diagnosed with candida esophagitis by egd 9 months ago, follows with Dr Gomez and has received several prolonged course of diflucan with intermittent improvement in sx. Pt followed up with Dr Conway last week due to recurring sx of thrush and now has chest pain and so was recommended to go ed for fungal iv abx therapy and rule out lung infection. Pt denies associated fevers, chills, dysphagia, odynophagia, sob, melena, or hematochezia. Pt reports chest pain as constant, 4/10, sternal area, no aggravating or alleviating factors.    #candida esophagitis, thrush  -throat cx  -iv antifungal therapy  -f/u fungal bld cx  -f/u bld cx x2  -ID consult  -GI consult  -d/w DR Barnhart    #anemia, r/o GIB, hx of PUD or possibly from esophagitis   -trend hgb  -transfuse 2u prbc  -IV PPI bid  -GI consult  -IVF  -f/u anemia workup sent in ED    #anxiety disorder  -c/w clonazepam and buspar    #DVT prophylaxis  -SCD only and ambulate, pt low risk for DVT and high risk for bleeding so avoid anticoagulants

## 2023-07-05 NOTE — ED PROVIDER NOTE - CLINICAL SUMMARY MEDICAL DECISION MAKING FREE TEXT BOX
48-year-old female history of anxiety depression hypothyroid presents to the ED for IV antibiotics of antifungals.  Patient states about a year ago she had tinnitus and hearing loss was placed on prednisone at the multiple recurrent episodes of candidiasis followed up with her GI doctor Mercedes who did an endoscopy noticed Candida esophagitis has been on multiple courses of fluconazole was recently on a 2-week course of fluconazole 150 mg a day however noticed worsening symptoms last 3 days has been on it but was told she is going to require IV antibiotics and a PICC line  vs reviewed labs imaging ekg obtained and reviewed, found to be anemic , no history black/bloody stools, prbc ordered patient admitted.

## 2023-07-05 NOTE — ED PROVIDER NOTE - ATTENDING APP SHARED VISIT CONTRIBUTION OF CARE
48-year-old female history of anxiety depression hypothyroid presents to the ED for IV antibiotics of antifungals.  Patient states about a year ago she had tinnitus and hearing loss was placed on prednisone at the multiple recurrent episodes of candidiasis followed up with her GI doctor kim izquierdo who did an endoscopy noticed Candida esophagitis has been on multiple courses of fluconazole was recently on a 2-week course of fluconazole 150 mg a day however noticed worsening symptoms last 3 days has been on it but was told she is going to require IV antibiotics and a PICC line   CONSTITUTIONAL: WA / WN / NAD  HEAD: NCAT  EYES: PERRL; EOMI;   ENT: Normal pharynx; mucous membranes pink/moist, posterior pharynx no plaques +erythema to left nare  NECK: Supple;  CARD: RRR; nl S1/S2; no M/R/G. Pulses equal bilaterally.  RESP: Respiratory rate and effort are normal; breath sounds clear and equal bilaterally  MSK/EXT: No gross deformities; full range of motion.  SKIN: Warm and dry;   NEURO: AAOx3,  PSYCH: Memory Intact, Normal Affect 48-year-old female history of anxiety depression hypothyroid presents to the ED for IV antibiotics of antifungals.  Patient states about a year ago she had tinnitus and hearing loss was placed on prednisone at the multiple recurrent episodes of candidiasis followed up with her GI doctor Mercedes who did an endoscopy noticed Candida esophagitis has been on multiple courses of fluconazole was recently on a 2-week course of fluconazole 150 mg a day however noticed worsening symptoms last 3 days has been on it but was told she is going to require IV antibiotics and a PICC line   CONSTITUTIONAL: WA / WN / NAD  HEAD: NCAT  EYES: PERRL; EOMI;   ENT: Normal pharynx; mucous membranes pink/moist, posterior pharynx no plaques +erythema to left nare  NECK: Supple;  CARD: RRR; nl S1/S2; no M/R/G. Pulses equal bilaterally.  RESP: Respiratory rate and effort are normal; breath sounds clear and equal bilaterally  MSK/EXT: No gross deformities; full range of motion.  SKIN: Warm and dry;   NEURO: AAOx3,  PSYCH: Memory Intact, Normal Affect

## 2023-07-05 NOTE — ED PROVIDER NOTE - OBJECTIVE STATEMENT
47 yo female, presents to er for recurrent candida infections, s/p high dose steroids for hearing loss, has recently completed course of 10 days of 150mg diflucan with minimal improvement. pt saw gi, had endoscopy concerning for esophagitis candida, advised to come to er for iv antifungals. Denies fever, chills, cp, sob, neck pain, visual changes, nvd, dizziness, numbness, tingling.

## 2023-07-05 NOTE — ED ADULT NURSE NOTE - NSFALLUNIVINTERV_ED_ALL_ED
----- Message from Noah Channel sent at 5/17/2022  4:42 PM EDT -----  Subject: Message to Provider    QUESTIONS  Information for Provider? pt states his oxygen company could not provide a   mask for him since he is only on 2 liters of oxygen pt would like an order   for that sent to [address] [place] [address] so he can pick it up there please advise  ---------------------------------------------------------------------------  --------------  [address]  What is the best way for the office to contact you? OK to leave message on   voicemail  Preferred Call Back Phone Number? [phone]  ---------------------------------------------------------------------------  --------------  SCRIPT ANSWERS  Relationship to Patient?  Self Bed/Stretcher in lowest position, wheels locked, appropriate side rails in place/Call bell, personal items and telephone in reach/Instruct patient to call for assistance before getting out of bed/chair/stretcher/Non-slip footwear applied when patient is off stretcher/[place] to call system/Physically safe environment - no spills, clutter or unnecessary equipment/Purposeful proactive rounding/Room/bathroom lighting operational, light cord in reach

## 2023-07-05 NOTE — PATIENT PROFILE ADULT - FALL HARM RISK - PATIENT NEEDS ASSISTANCE
Problem: Pressure Injury Actual  Goal: # No deterioration in pressure injury (PI)  12/6/2020 1648 by Foster Mitchell RN  Outcome: Outcome Met, Continue evaluating goal progress toward completion  12/6/2020 1251 by Foster Mitchell RN  Outcome: Outcome Met, Continue evaluating goal progress toward completion     Problem: At Risk for Falls  Goal: # Patient does not fall  12/6/2020 1648 by Foster Mitchell RN  Outcome: Outcome Met, Continue evaluating goal progress toward completion  12/6/2020 1251 by Foster Mitchell RN  Outcome: Outcome Met, Continue evaluating goal progress toward completion     Problem: At Risk for Falls  Goal: # Takes action to control fall-related risks  12/6/2020 1648 by Foster Mitchell RN  Outcome: Outcome Met, Continue evaluating goal progress toward completion  12/6/2020 1251 by Foster Mitchell RN  Outcome: Outcome Met, Continue evaluating goal progress toward completion     Problem: At Risk for Falls  Goal: # Verbalizes understanding of fall risk/precautions  Description: Document education using the patient education activity  12/6/2020 1648 by Foster Mitchell RN  Outcome: Outcome Met, Continue evaluating goal progress toward completion  12/6/2020 1251 by Foster Mitchell RN  Outcome: Outcome Met, Continue evaluating goal progress toward completion     Problem: At Risk for Injury Due to Fall  Goal: # Patient does not fall  12/6/2020 1648 by Foster Mitchell RN  Outcome: Outcome Met, Continue evaluating goal progress toward completion  12/6/2020 1251 by Foster Mitchell RN  Outcome: Outcome Met, Continue evaluating goal progress toward completion     Problem: At Risk for Injury Due to Fall  Goal: # Takes action to control condition specific risks  12/6/2020 1648 by Foster Mitchell RN  Outcome: Outcome Met, Continue evaluating goal progress toward completion  12/6/2020 1251 by Foster Mitchell RN  Outcome: Outcome Met, Continue evaluating goal progress toward completion     Problem: At  Risk for Injury Due to Fall  Goal: # Verbalizes understanding of fall-related injury personal risks  Description: Document education using the patient education activity  12/6/2020 1648 by Foster Mitchell RN  Outcome: Outcome Met, Continue evaluating goal progress toward completion  12/6/2020 1251 by Foster Mitchell RN  Outcome: Outcome Met, Continue evaluating goal progress toward completion     Problem: Activity Intolerance  Goal: # Functional status is maintained or returned to baseline  12/6/2020 1648 by Foster Mitchell RN  Outcome: Outcome Met, Continue evaluating goal progress toward completion  12/6/2020 1251 by Foster Mitchell RN  Outcome: Outcome Not Met at Discharge, Continue plan of care     Problem: Pressure Injury, Risk for  Goal: # Skin remains intact  12/6/2020 1648 by Foster Mitchell RN  Outcome: Outcome Met, Continue evaluating goal progress toward completion  12/6/2020 1251 by Foster Mitchell RN  Outcome: Outcome Met, Continue evaluating goal progress toward completion     Problem: Pressure Injury, Risk for  Goal: No new pressure injury (PI) development  12/6/2020 1648 by Foster Mitchell RN  Outcome: Outcome Met, Continue evaluating goal progress toward completion  12/6/2020 1251 by Foster Mitchell RN  Outcome: Outcome Met, Continue evaluating goal progress toward completion     Problem: Diabetes  Goal: Glycemic balance achieved/maintained  Description: Goal is to maintain blood sugar within range with no episodes of hypoglycemia  12/6/2020 1648 by Foster Mitchell RN  Outcome: Outcome Met, Continue evaluating goal progress toward completion  12/6/2020 1251 by Foster Mitchell RN  Outcome: Outcome Met, Continue evaluating goal progress toward completion     Problem: VTE, Risk for  Goal: # No s/s of VTE  12/6/2020 1648 by Foster Mitchell RN  Outcome: Outcome Met, Continue evaluating goal progress toward completion  12/6/2020 1251 by Foster Mitchell RN  Outcome: Outcome Met, Continue  evaluating goal progress toward completion     Problem: Impaired Physical Mobility  Goal: # Bed mobility, ambulation, and ADLs are maintained or returned to baseline during hospitalization  12/6/2020 1648 by Foster Mitchell RN  Outcome: Outcome Met, Continue evaluating goal progress toward completion  12/6/2020 1251 by Foster Mitchell RN  Outcome: Outcome Not Met at Discharge, Continue plan of care     Problem: Dysphagia  Goal: # Exhibits no s/s of aspiration  Description: Symptoms of aspiration include coughing, choking, throat clearing, change in voice quality, and/or a decrease in oxygen saturation by more than 2% points from baseline after swallowing.  12/6/2020 1648 by Foster Mitchell RN  Outcome: Outcome Met, Continue evaluating goal progress toward completion  12/6/2020 1251 by Foster Mitchell RN  Outcome: Outcome Met, Continue evaluating goal progress toward completion     Problem: Dysphagia  Goal: # Verbalizes understanding of dysphagia management  Description: Document education using the patient education activity.  12/6/2020 1648 by Foster Mitchell RN  Outcome: Outcome Met, Continue evaluating goal progress toward completion  12/6/2020 1251 by Foster Mitchell RN  Outcome: Outcome Met, Continue evaluating goal progress toward completion     Problem: Pain  Goal: #Acceptable pain level achieved/maintained at rest using NRS/Faces  Description: This goal is used for patients who can self-report.  Acceptable means the level is at or below the identified comfort/function goal.  12/6/2020 1648 by Foster Mitchell RN  Outcome: Outcome Met, Continue evaluating goal progress toward completion  12/6/2020 1251 by Foster Mitchell RN  Outcome: Outcome Met, Continue evaluating goal progress toward completion     Problem: Pain  Goal: # Acceptable pain level achieved/maintained with activity using NRS/Faces  Description: This goal is used for patients who can self-report and are not achieving acceptable pain  control during activity.  12/6/2020 1648 by Foster Mitchell, RN  Outcome: Outcome Met, Continue evaluating goal progress toward completion  12/6/2020 1251 by Foster Mitchell, RN  Outcome: Outcome Met, Continue evaluating goal progress toward completion      No assistance needed

## 2023-07-05 NOTE — PATIENT PROFILE ADULT - DOES PATIENT HAVE ADVANCE DIRECTIVE
No
Implemented All Universal Safety Interventions:  [place] to call system. Call bell, personal items and telephone within reach. Instruct patient to call for assistance. Room bathroom lighting operational. Non-slip footwear when patient is off stretcher. Physically safe environment: no spills, clutter or unnecessary equipment. Stretcher in lowest position, wheels locked, appropriate side rails in place.

## 2023-07-05 NOTE — H&P ADULT - VTE RISK ASSESSMENT
"Spoke with pt she rescheduled appt for 11/4 @ 8:30am, the pt states she is "doing good" on the 16 units.  "
----- Message from Sussy Goldberg NP sent at 10/8/2019 12:28 PM CDT -----  Patient requests to reschedule appt. Please call her. Thanks   
VTE Assessment already completed for this visit

## 2023-07-05 NOTE — H&P ADULT - NSICDXPASTMEDICALHX_GEN_ALL_CORE_FT
PAST MEDICAL HISTORY:  Anxiety     Candida esophagitis     Depression     H/O gastric ulcer     H/O thalassemia minor     History of thrush     HLD (hyperlipidemia)     HTN (hypertension) borderline -no meds    Hypothyroidism     Lower back pain     OA (osteoarthritis)     Obesity bmi  47.6    GRECIA on CPAP

## 2023-07-05 NOTE — H&P ADULT - HISTORY OF PRESENT ILLNESS
Pt is a 49yo F with a pmhx obesity, gastric bypass, PUD, anxiety disorder, candida esophagitis after being on prolonged steroids for hearing loss whom reports being first diagnosed with candida esophagitis by egd 9 months ago, follows with Dr Gomez and has received several prolonged course of diflucan with intermittent improvement in sx. Pt followed up with Dr Conway last week due to recurring sx of thrush and now has chest pain and so was recommended to go ed for fungal iv abx therapy and rule out lung infection. Pt denies associated fevers, chills, dysphagia, odynophagia, sob, melena, or hematochezia. Pt reports chest pain as constant, 4/10, sternal area, no aggravating or alleviating factors..

## 2023-07-05 NOTE — CONSULT NOTE ADULT - SUBJECTIVE AND OBJECTIVE BOX
Chief complaint/Reason for consult:    HPI:  Pt is a 49yo F with a pmhx obesity, gastric bypass, PUD, anxiety disorder, candida esophagitis after being on prolonged steroids for hearing loss whom reports being first diagnosed with candida esophagitis by egd 9 months ago, follows with Dr Gomez and has received several prolonged course of diflucan with intermittent improvement in sx. Pt followed up with Dr Conway last week due to recurring sx of thrush and now has chest pain and so was recommended to go ed for fungal iv abx therapy and rule out lung infection. Pt denies associated fevers, chills, dysphagia, odynophagia, sob, melena, or hematochezia. Pt reports chest pain as constant, 4/10, sternal area, no aggravating or alleviating factors..  (05 Jul 2023 11:03)    48yFemale      PAST MEDICAL & SURGICAL HISTORY:      Family history:  FAMILY HISTORY:  Family history of CVA  GRANDMOTHER      No GI cancers in first or second degree relatives    Social History: No smoking. No alcohol. No illegal drug use.    Allergies:        MEDICATIONS:        MEDICATIONS  (STANDING):  busPIRone 10 milliGRAM(s) Oral two times a day  caspofungin IVPB 70 milliGRAM(s) IV Intermittent once  caspofungin IVPB      chlorhexidine 4% Liquid 1 Application(s) Topical <User Schedule>  pantoprazole  Injectable 40 milliGRAM(s) IV Push two times a day    MEDICATIONS  (PRN):  acetaminophen     Tablet .. 650 milliGRAM(s) Oral every 6 hours PRN Temp greater or equal to 38C (100.4F), Mild Pain (1 - 3)  aluminum hydroxide/magnesium hydroxide/simethicone Suspension 30 milliLiter(s) Oral every 4 hours PRN Dyspepsia  clonazePAM  Tablet 0.25 milliGRAM(s) Oral daily PRN for anxiety  melatonin 3 milliGRAM(s) Oral at bedtime PRN Insomnia  ondansetron Injectable 4 milliGRAM(s) IV Push every 8 hours PRN Nausea and/or Vomiting        REVIEW OF SYSTEMS  General:  No weight loss, fevers, or chills.  Eyes:  No reported pain or visual changes  ENT:  No sore throat or runny nose.  NECK: No stiffness or lymphadenopathy  CV:  No chest pain or palpitations.  Resp:  No shortness of breath, cough, wheezing or hemoptysis  GI:  No abdominal pain, nausea, vomiting, dysphagia, diarrhea or constipation. No rectal bleeding, melena, or hematemesis.  Muscle:  No aches or weakness  Neuro:  No tingling, numbness       VITALS:   T(F): 96.3 (07-05-23 @ 12:52), Max: 98.2 (07-05-23 @ 05:40)  HR: 76 (07-05-23 @ 12:52) (58 - 76)  BP: 111/55 (07-05-23 @ 12:52) (98/48 - 113/56)  RR: 18 (07-05-23 @ 12:52) (18 - 18)  SpO2: 99% (07-05-23 @ 12:52) (98% - 100%)    PHYSICAL EXAM:  GENERAL: AAOx3, no acute distress.  HEAD:  Atraumatic, Normocephalic  EYES: conjunctiva and sclera clear  NECK: Supple, No thyromegaly   CHEST/LUNG: Clear to auscultation bilaterally; No wheeze, rhonchi, or rales  HEART: Regular rate and rhythm; normal S1, S2, No murmurs.  ABDOMEN: Soft, nontender, nondistended; Bowel sounds present  NEUROLOGY: No asterixis or tremor  SKIN: Intact, no jaundice          LABS:  07-05    135  |  101  |  12  ----------------------------<  92  4.1   |  26  |  0.5<L>    Ca    8.3<L>      05 Jul 2023 07:55    TPro  5.6<L>  /  Alb  4.0  /  TBili  <0.2  /  DBili  x   /  AST  26  /  ALT  11  /  AlkPhos  38  07-05                          5.4    4.27  )-----------( 240      ( 05 Jul 2023 08:30 )             21.6     LIVER FUNCTIONS - ( 05 Jul 2023 07:55 )  Alb: 4.0 g/dL / Pro: 5.6 g/dL / ALK PHOS: 38 U/L / ALT: 11 U/L / AST: 26 U/L / GGT: x           PT/INR - ( 05 Jul 2023 07:55 )   PT: 11.80 sec;   INR: 1.03 ratio         PTT - ( 05 Jul 2023 07:55 )  PTT:31.4 sec    IMAGING:         Chief complaint/Reason for consult: anemia, esophageal candidiasis    HPI:  Pt is a 47yo F with a pmhx obesity, gastric bypass, PUD, anxiety disorder, candida esophagitis after being on prolonged steroids for hearing loss whom reports being first diagnosed with candida esophagitis by egd 9 months ago, follows with Dr Gomez and has received several prolonged course of diflucan with intermittent improvement in sx. Pt followed up with Dr Conway last week due to recurring sx of thrush and now has chest pain and so was recommended to go ed for fungal iv abx therapy and rule out lung infection. Pt denies associated fevers, chills, dysphagia, odynophagia, sob, melena, or hematochezia. Pt reports chest pain as constant, 4/10, sternal area, no aggravating or alleviating factors..  (05 Jul 2023 11:03)    GI Updates: 48yFemale pmh obesity, gastric bypass, PUD, anxiety disorder, candida esophagitis s/p one month of diflucan after being on prolonged steroids for heating loss presents for dysphagia again, patient reports she was having candidal thrush, vaginal candidiasis. Currently Patient denies nausea, vomiting, hematemesis, melena, blood in stool, diarrhea, constipation, abdominal pain.      PAST MEDICAL & SURGICAL HISTORY:  Anxiety      Lower back pain      HLD (hyperlipidemia)      HTN (hypertension)  borderline -no meds      Hypothyroidism      OA (osteoarthritis)      H/O gastric ulcer      Obesity  bmi  47.6      Depression      GRECIA on CPAP      H/O thalassemia minor      Candida esophagitis      History of thrush      H/O tubal ligation      S/P tonsillectomy      History of D&C  HYSTEROSCOPY-AUG 2019      H/O umbilical hernia repair            Family history:  FAMILY HISTORY:  Family history of CVA  GRANDMOTHER      No GI cancers in first or second degree relatives    Social History: No smoking. No alcohol. No illegal drug use.    Allergies:   No Known Allergies      MEDICATIONS: Home Medications:  BuSpar 10 mg oral tablet: 1 orally 2 times a day (05 Jul 2023 10:54)  clonazePAM 0.5 mg oral tablet: 0.5 tab(s) orally once a day as needed for  anxiety (05 Jul 2023 10:54)  fluconazole 150 mg oral tablet: 1 orally 2 times a day (05 Jul 2023 10:54)  OMEPRAZOLE  40 MG CPDR:  (05 Jul 2023 10:52)    MEDICATIONS  (STANDING):  busPIRone 10 milliGRAM(s) Oral two times a day  caspofungin IVPB 70 milliGRAM(s) IV Intermittent once  caspofungin IVPB      chlorhexidine 4% Liquid 1 Application(s) Topical <User Schedule>  pantoprazole  Injectable 40 milliGRAM(s) IV Push two times a day    MEDICATIONS  (PRN):  acetaminophen     Tablet .. 650 milliGRAM(s) Oral every 6 hours PRN Temp greater or equal to 38C (100.4F), Mild Pain (1 - 3)  aluminum hydroxide/magnesium hydroxide/simethicone Suspension 30 milliLiter(s) Oral every 4 hours PRN Dyspepsia  clonazePAM  Tablet 0.25 milliGRAM(s) Oral daily PRN for anxiety  melatonin 3 milliGRAM(s) Oral at bedtime PRN Insomnia  ondansetron Injectable 4 milliGRAM(s) IV Push every 8 hours PRN Nausea and/or Vomiting        REVIEW OF SYSTEMS  General:  No weight loss, fevers, or chills.  Eyes:  No reported pain or visual changes  ENT:  No sore throat or runny nose.  NECK: No stiffness or lymphadenopathy  CV:  No chest pain or palpitations.  Resp:  No shortness of breath, cough, wheezing or hemoptysis  GI:  No abdominal pain, nausea, vomiting, +dysphagia, no diarrhea or constipation. No rectal bleeding, melena, or hematemesis.  Muscle:  No aches or weakness  Neuro:  No tingling, numbness       VITALS:   T(F): 96.3 (07-05-23 @ 12:52), Max: 98.2 (07-05-23 @ 05:40)  HR: 76 (07-05-23 @ 12:52) (58 - 76)  BP: 111/55 (07-05-23 @ 12:52) (98/48 - 113/56)  RR: 18 (07-05-23 @ 12:52) (18 - 18)  SpO2: 99% (07-05-23 @ 12:52) (98% - 100%)    PHYSICAL EXAM:  GENERAL: AAOx3, no acute distress.  HEAD:  Atraumatic, Normocephalic  EYES: conjunctiva and sclera clear  NECK: Supple, No thyromegaly   CHEST/LUNG: Clear to auscultation bilaterally; No wheeze, rhonchi, or rales  HEART: Regular rate and rhythm; normal S1, S2, No murmurs.  ABDOMEN: Soft, nontender, nondistended; Bowel sounds present  NEUROLOGY: No asterixis or tremor  SKIN: Intact, no jaundice  rectal exam-brown stool in rectal vault and on finger        LABS:  07-05    135  |  101  |  12  ----------------------------<  92  4.1   |  26  |  0.5<L>    Ca    8.3<L>      05 Jul 2023 07:55    TPro  5.6<L>  /  Alb  4.0  /  TBili  <0.2  /  DBili  x   /  AST  26  /  ALT  11  /  AlkPhos  38  07-05                          5.4    4.27  )-----------( 240      ( 05 Jul 2023 08:30 )             21.6     LIVER FUNCTIONS - ( 05 Jul 2023 07:55 )  Alb: 4.0 g/dL / Pro: 5.6 g/dL / ALK PHOS: 38 U/L / ALT: 11 U/L / AST: 26 U/L / GGT: x           PT/INR - ( 05 Jul 2023 07:55 )   PT: 11.80 sec;   INR: 1.03 ratio         PTT - ( 05 Jul 2023 07:55 )  PTT:31.4 sec    IMAGING:      < from: Xray Chest 2 Views PA/Lat (07.05.23 @ 07:57) >    ACC: [id number] EXAM:  XR CHEST PA LAT 2V   ORDERED BY: ROMAN REYES     PROCEDURE DATE:  07/05/2023          INTERPRETATION:  Clinical History / Reason for exam: Chest pain    Comparison : Chest radiograph 12/8/2020.    Technique/Positioning: PA and lateral chest radiograph.    Findings:    Support devices: None.    Cardiac/mediastinum/hilum: Unremarkable.    Lung parenchyma/Pleura: No focal consolidation, effusion or pneumothorax.    Skeleton/soft tissues: Unremarkable.    Impression:    No radiographic evidence of acute cardiopulmonary disease.        --- End of Report ---            NARDA RIVAS MD; Attending Radiologist  This document has been electronically signed. Jul 5 2023  8:33AM    < end of copied text >     Chief complaint/Reason for consult: anemia, esophageal candidiasis    HPI:  Pt is a 47yo F with a pmhx obesity, gastric bypass, PUD, anxiety disorder, candida esophagitis after being on prolonged steroids for hearing loss whom reports being first diagnosed with candida esophagitis by egd 9 months ago, follows with Dr Gomez and has received several prolonged course of diflucan with intermittent improvement in sx. Pt followed up with Dr Conway last week due to recurring sx of thrush and now has chest pain and so was recommended to go ed for fungal iv abx therapy and rule out lung infection. Pt denies associated fevers, chills, dysphagia, odynophagia, sob, melena, or hematochezia. Pt reports chest pain as constant, 4/10, sternal area, no aggravating or alleviating factors..  (05 Jul 2023 11:03)    GI Updates: 48yFemale pmh obesity, gastric bypass, PUD, anxiety disorder, candida esophagitis s/p one month of diflucan after being on prolonged steroids for heating loss presents for dysphagia again, patient reports she was having candidal thrush, vaginal candidiasis. Currently Patient denies nausea, vomiting, hematemesis, melena, blood in stool, diarrhea, constipation, abdominal pain.      PAST MEDICAL & SURGICAL HISTORY:  Anxiety      Lower back pain      HLD (hyperlipidemia)      HTN (hypertension)  borderline -no meds      Hypothyroidism      OA (osteoarthritis)      H/O gastric ulcer      Obesity  bmi  47.6      Depression      GRECIA on CPAP      H/O thalassemia minor      Candida esophagitis      History of thrush      H/O tubal ligation      S/P tonsillectomy      History of D&C  HYSTEROSCOPY-AUG 2019      H/O umbilical hernia repair            Family history:  FAMILY HISTORY:  Family history of CVA  GRANDMOTHER      No GI cancers in first or second degree relatives    Social History: +marijuana smoking. No alcohol. No illegal drug use.    Allergies:   No Known Allergies      MEDICATIONS: Home Medications:  BuSpar 10 mg oral tablet: 1 orally 2 times a day (05 Jul 2023 10:54)  clonazePAM 0.5 mg oral tablet: 0.5 tab(s) orally once a day as needed for  anxiety (05 Jul 2023 10:54)  fluconazole 150 mg oral tablet: 1 orally 2 times a day (05 Jul 2023 10:54)  OMEPRAZOLE  40 MG CPDR:  (05 Jul 2023 10:52)    MEDICATIONS  (STANDING):  busPIRone 10 milliGRAM(s) Oral two times a day  caspofungin IVPB 70 milliGRAM(s) IV Intermittent once  caspofungin IVPB      chlorhexidine 4% Liquid 1 Application(s) Topical <User Schedule>  pantoprazole  Injectable 40 milliGRAM(s) IV Push two times a day    MEDICATIONS  (PRN):  acetaminophen     Tablet .. 650 milliGRAM(s) Oral every 6 hours PRN Temp greater or equal to 38C (100.4F), Mild Pain (1 - 3)  aluminum hydroxide/magnesium hydroxide/simethicone Suspension 30 milliLiter(s) Oral every 4 hours PRN Dyspepsia  clonazePAM  Tablet 0.25 milliGRAM(s) Oral daily PRN for anxiety  melatonin 3 milliGRAM(s) Oral at bedtime PRN Insomnia  ondansetron Injectable 4 milliGRAM(s) IV Push every 8 hours PRN Nausea and/or Vomiting        REVIEW OF SYSTEMS  General:  No weight loss, fevers, or chills.  Eyes:  No reported pain or visual changes  ENT:  No sore throat or runny nose.  NECK: No stiffness or lymphadenopathy  CV:  No chest pain or palpitations.  Resp:  No shortness of breath, cough, wheezing or hemoptysis  GI:  No abdominal pain, nausea, vomiting, +dysphagia, no diarrhea or constipation. No rectal bleeding, melena, or hematemesis.  Muscle:  No aches or weakness  Neuro:  No tingling, numbness       VITALS:   T(F): 96.3 (07-05-23 @ 12:52), Max: 98.2 (07-05-23 @ 05:40)  HR: 76 (07-05-23 @ 12:52) (58 - 76)  BP: 111/55 (07-05-23 @ 12:52) (98/48 - 113/56)  RR: 18 (07-05-23 @ 12:52) (18 - 18)  SpO2: 99% (07-05-23 @ 12:52) (98% - 100%)    PHYSICAL EXAM:  GENERAL: AAOx3, no acute distress.  HEAD:  Atraumatic, Normocephalic  EYES: conjunctiva and sclera clear  NECK: Supple, No thyromegaly   CHEST/LUNG: Clear to auscultation bilaterally; No wheeze, rhonchi, or rales  HEART: Regular rate and rhythm; normal S1, S2, No murmurs.  ABDOMEN: Soft, nontender, nondistended; Bowel sounds present  NEUROLOGY: No asterixis or tremor  SKIN: Intact, no jaundice  rectal exam-brown stool in rectal vault and on finger        LABS:  07-05    135  |  101  |  12  ----------------------------<  92  4.1   |  26  |  0.5<L>    Ca    8.3<L>      05 Jul 2023 07:55    TPro  5.6<L>  /  Alb  4.0  /  TBili  <0.2  /  DBili  x   /  AST  26  /  ALT  11  /  AlkPhos  38  07-05                          5.4    4.27  )-----------( 240      ( 05 Jul 2023 08:30 )             21.6     LIVER FUNCTIONS - ( 05 Jul 2023 07:55 )  Alb: 4.0 g/dL / Pro: 5.6 g/dL / ALK PHOS: 38 U/L / ALT: 11 U/L / AST: 26 U/L / GGT: x           PT/INR - ( 05 Jul 2023 07:55 )   PT: 11.80 sec;   INR: 1.03 ratio         PTT - ( 05 Jul 2023 07:55 )  PTT:31.4 sec    IMAGING:      < from: Xray Chest 2 Views PA/Lat (07.05.23 @ 07:57) >    ACC: [id number] EXAM:  XR CHEST PA LAT 2V   ORDERED BY: ROMAN REYES     PROCEDURE DATE:  07/05/2023          INTERPRETATION:  Clinical History / Reason for exam: Chest pain    Comparison : Chest radiograph 12/8/2020.    Technique/Positioning: PA and lateral chest radiograph.    Findings:    Support devices: None.    Cardiac/mediastinum/hilum: Unremarkable.    Lung parenchyma/Pleura: No focal consolidation, effusion or pneumothorax.    Skeleton/soft tissues: Unremarkable.    Impression:    No radiographic evidence of acute cardiopulmonary disease.        --- End of Report ---            NARDA RIVAS MD; Attending Radiologist  This document has been electronically signed. Jul 5 2023  8:33AM    < end of copied text >

## 2023-07-05 NOTE — ED PROVIDER NOTE - PHYSICAL EXAMINATION
Physical Exam    Vital Signs: I have reviewed the initial vital signs.  Constitutional: appears stated age, no acute distress  Eyes: Conjunctiva pink, Sclera clear,  ENT: mucous membranes pink/moist, posterior pharynx no plaques +erythema to left nare  Cardiovascular: S1 and S2, regular rate, regular rhythm, well-perfused extremities, radial pulses equal and 2+, pedal pulses 2+ and equal  Respiratory: unlabored respiratory effort, clear to auscultation bilaterally no wheezing, rales and rhonchi  Gastrointestinal: soft, non-tender abdomen, no pulsatile mass, normal bowl sounds  Musculoskeletal: supple neck, no lower extremity edema, no midline tenderness  Integumentary: warm, dry, no rash  Neurologic: awake, alert, nvi

## 2023-07-05 NOTE — CONSULT NOTE ADULT - ASSESSMENT
48yFemale pmh obesity, gastric bypass, PUD, anxiety disorder, candida esophagitis s/p one month of diflucan after being on prolonged steroids for heating loss presents for dysphagia again, patient reports she was having candidal thrush, vaginal candidiasis.    EGD 11/17/22  Impressions  -Esophagitis  -esophageal candidiasis   -acute gastritis s/p gastric bypass    Problem 1-Esophageal candidiasis  dysphagia  Rec  -unresponsive to diflucan  -obtain ID consult  -PPI IV BID      Problem 2-anemia no gross GI bleeding  RBC normal, microcytic anemia  patient with thalassemia    Rec  -Rec  -Maintain Hemodynamic Stability   -Monitor CBC  -CMP,Optimize Electrolytes  -PT,PTT,INR  -EKG, Chest-Xray   -Transfuse prn to hgb >8  -Two large bore IV lines  -Continue PPI BID  -Monitor Vital Signs  -Keep patient NPO  -Monitor Stool For blood, frequency, consistency, melena  -Active Type and Screen  -Iron Studies, Folate, Vitamin B12 levels  48yFemale pmh obesity, gastric bypass, PUD, anxiety disorder, candida esophagitis s/p one month of diflucan after being on prolonged steroids for heating loss presents for dysphagia again, patient reports she was having candidal thrush, vaginal candidiasis.    EGD 11/17/22 Dr. Conway  Impressions  -Esophagitis  -esophageal candidiasis   -acute gastritis s/p gastric bypass  path shows chronic gastritis and HP negative     Problem 1-Esophageal candidiasis  dysphagia  Rec  -unresponsive to diflucan  -obtain ID consult may need to consider posaconazole, itraconazole vs IV caspofungin or IV micafungin   -PPI IV BID  -EGD records as above    Problem 2-anemia no gross GI bleeding  RBC normal, microcytic anemia  patient with thalassemia , brown stool on rectal exam  Rec  -consider Colonoscopy as outpatient   -hematology consult  -Maintain Hemodynamic Stability   -Monitor CBC  -CMP,Optimize Electrolytes  -PT,PTT,INR  -EKG, Chest-Xray   -Transfuse prn to hgb >8  -Two large bore IV lines  -Continue PPI BID  -Monitor Vital Signs  -Keep patient NPO  -Monitor Stool For blood, frequency, consistency, melena  -Active Type and Screen  -Iron Studies, Folate, Vitamin B12 levels  48yFemale pmh obesity, gastric bypass, PUD, anxiety disorder, candida esophagitis s/p one month of diflucan after being on prolonged steroids for heating loss presents for dysphagia again, patient reports she was having candidal thrush, vaginal candidiasis.    EGD 11/17/22 Dr. Conway  Impressions  -Esophagitis  -esophageal candidiasis   -acute gastritis s/p gastric bypass  path shows chronic gastritis and HP negative     Problem 1-Esophageal candidiasis  dysphagia  Rec  -unresponsive to diflucan  -obtain ID consult: treatment per ID: may need to consider posaconazole, itraconazole vs IV caspofungin or IV micafungin   -PPI IV BID  -EGD records as above    Problem 2-anemia no gross GI bleeding  RBC normal, microcytic anemia  patient with thalassemia , brown stool on rectal exam  Rec  -consider Colonoscopy as outpatient   -hematology consult  -Maintain Hemodynamic Stability   -Monitor CBC  -CMP,Optimize Electrolytes  -PT,PTT,INR  -EKG, Chest-Xray   -Transfuse prn to hgb >8  -Two large bore IV lines  -Continue PPI BID  -Monitor Vital Signs  -Keep patient NPO  -Monitor Stool For blood, frequency, consistency, melena  -Active Type and Screen  -Iron Studies, Folate, Vitamin B12 levels

## 2023-07-05 NOTE — H&P ADULT - NSHPPHYSICALEXAM_GEN_ALL_CORE
Vital Signs Last 24 Hrs  T(C): 36.7 (05 Jul 2023 08:19), Max: 36.8 (05 Jul 2023 05:40)  T(F): 98 (05 Jul 2023 08:19), Max: 98.2 (05 Jul 2023 05:40)  HR: 74 (05 Jul 2023 09:36) (64 - 74)  BP: 113/56 (05 Jul 2023 09:36) (98/48 - 113/56)  BP(mean): --  RR: 18 (05 Jul 2023 09:36) (18 - 18)  SpO2: 100% (05 Jul 2023 09:36) (98% - 100%)    PHYSICAL EXAM:      Constitutional: A&Ox4, NAD, +pallor  Respiratory: cta b/l  Cardiovascular: s1 s2 rrr  Gastrointestinal: soft nt  nd + bs no rebound or guarding  Genitourinary: no cva tenderness  Extremities: normal rom, no edema, calf tenderness  Neurological:no focal deficits  Skin: no rash Vital Signs Last 24 Hrs  T(C): 36.7 (05 Jul 2023 08:19), Max: 36.8 (05 Jul 2023 05:40)  T(F): 98 (05 Jul 2023 08:19), Max: 98.2 (05 Jul 2023 05:40)  HR: 74 (05 Jul 2023 09:36) (64 - 74)  BP: 113/56 (05 Jul 2023 09:36) (98/48 - 113/56)  BP(mean): --  RR: 18 (05 Jul 2023 09:36) (18 - 18)  SpO2: 100% (05 Jul 2023 09:36) (98% - 100%)    PHYSICAL EXAM:      Constitutional: A&Ox4, NAD, +pallor  HEENT: no thrush or pharyngitis  Respiratory: cta b/l  Cardiovascular: s1 s2 rrr  Gastrointestinal: soft nt  nd + bs no rebound or guarding  Genitourinary: no cva tenderness  Extremities: normal rom, no edema, calf tenderness  Neurological:no focal deficits  Skin: no rash

## 2023-07-05 NOTE — ED PROVIDER NOTE - NS ED ATTENDING STATEMENT MOD
This was a shared visit with the [facility]. I reviewed and verified the documentation and independently performed the documented:

## 2023-07-05 NOTE — H&P ADULT - NSHPLABSRESULTS_GEN_ALL_CORE
5.4    4.27  )-----------( 240      ( 05 Jul 2023 08:30 )             21.6       07-05    135  |  101  |  12  ----------------------------<  92  4.1   |  26  |  0.5<L>    Ca    8.3<L>      05 Jul 2023 07:55    TPro  5.6<L>  /  Alb  4.0  /  TBili  <0.2  /  DBili  x   /  AST  26  /  ALT  11  /  AlkPhos  38  07-05              Urinalysis Basic - ( 05 Jul 2023 07:55 )    Color: x / Appearance: x / SG: x / pH: x  Gluc: 92 mg/dL / Ketone: x  / Bili: x / Urobili: x   Blood: x / Protein: x / Nitrite: x   Leuk Esterase: x / RBC: x / WBC x   Sq Epi: x / Non Sq Epi: x / Bacteria: x        PT/INR - ( 05 Jul 2023 07:55 )   PT: 11.80 sec;   INR: 1.03 ratio         PTT - ( 05 Jul 2023 07:55 )  PTT:31.4 sec    Lactate Trend  07-05 @ 07:55 Lactate:1.1       CARDIAC MARKERS ( 05 Jul 2023 07:55 )  x     / <0.01 ng/mL / x     / x     / x            < from: Xray Chest 2 Views PA/Lat (07.05.23 @ 07:57) >      Impression:    No radiographic evidence of acute cardiopulmonary disease.

## 2023-07-06 LAB
ANION GAP SERPL CALC-SCNC: 11 MMOL/L — SIGNIFICANT CHANGE UP (ref 7–14)
BASOPHILS # BLD AUTO: 0.04 K/UL — SIGNIFICANT CHANGE UP (ref 0–0.2)
BASOPHILS NFR BLD AUTO: 1 % — SIGNIFICANT CHANGE UP (ref 0–1)
BUN SERPL-MCNC: 10 MG/DL — SIGNIFICANT CHANGE UP (ref 10–20)
CALCIUM SERPL-MCNC: 8.2 MG/DL — LOW (ref 8.4–10.5)
CHLORIDE SERPL-SCNC: 107 MMOL/L — SIGNIFICANT CHANGE UP (ref 98–110)
CO2 SERPL-SCNC: 26 MMOL/L — SIGNIFICANT CHANGE UP (ref 17–32)
CREAT SERPL-MCNC: 0.5 MG/DL — LOW (ref 0.7–1.5)
EGFR: 116 ML/MIN/1.73M2 — SIGNIFICANT CHANGE UP
EOSINOPHIL # BLD AUTO: 0.31 K/UL — SIGNIFICANT CHANGE UP (ref 0–0.7)
EOSINOPHIL NFR BLD AUTO: 7.5 % — SIGNIFICANT CHANGE UP (ref 0–8)
GLUCOSE SERPL-MCNC: 82 MG/DL — SIGNIFICANT CHANGE UP (ref 70–99)
HCG UR QL: NEGATIVE — SIGNIFICANT CHANGE UP
HCT VFR BLD CALC: 26.8 % — LOW (ref 37–47)
HGB BLD-MCNC: 7.3 G/DL — LOW (ref 12–16)
IMM GRANULOCYTES NFR BLD AUTO: 0.2 % — SIGNIFICANT CHANGE UP (ref 0.1–0.3)
LYMPHOCYTES # BLD AUTO: 1.61 K/UL — SIGNIFICANT CHANGE UP (ref 1.2–3.4)
LYMPHOCYTES # BLD AUTO: 39.1 % — SIGNIFICANT CHANGE UP (ref 20.5–51.1)
MCHC RBC-ENTMCNC: 15.1 PG — LOW (ref 27–31)
MCHC RBC-ENTMCNC: 27.2 G/DL — LOW (ref 32–37)
MCV RBC AUTO: 55.3 FL — LOW (ref 81–99)
MONOCYTES # BLD AUTO: 0.28 K/UL — SIGNIFICANT CHANGE UP (ref 0.1–0.6)
MONOCYTES NFR BLD AUTO: 6.8 % — SIGNIFICANT CHANGE UP (ref 1.7–9.3)
NEUTROPHILS # BLD AUTO: 1.87 K/UL — SIGNIFICANT CHANGE UP (ref 1.4–6.5)
NEUTROPHILS NFR BLD AUTO: 45.4 % — SIGNIFICANT CHANGE UP (ref 42.2–75.2)
NRBC # BLD: 0 /100 WBCS — SIGNIFICANT CHANGE UP (ref 0–0)
PLATELET # BLD AUTO: 217 K/UL — SIGNIFICANT CHANGE UP (ref 130–400)
PMV BLD: SIGNIFICANT CHANGE UP (ref 7.4–10.4)
POTASSIUM SERPL-MCNC: 4.1 MMOL/L — SIGNIFICANT CHANGE UP (ref 3.5–5)
POTASSIUM SERPL-SCNC: 4.1 MMOL/L — SIGNIFICANT CHANGE UP (ref 3.5–5)
RBC # BLD: 4.85 M/UL — SIGNIFICANT CHANGE UP (ref 4.2–5.4)
RBC # FLD: 32.3 % — HIGH (ref 11.5–14.5)
SODIUM SERPL-SCNC: 144 MMOL/L — SIGNIFICANT CHANGE UP (ref 135–146)
TROPONIN T SERPL-MCNC: 0.03 NG/ML — CRITICAL HIGH
TROPONIN T SERPL-MCNC: <0.01 NG/ML — SIGNIFICANT CHANGE UP
WBC # BLD: 4.12 K/UL — LOW (ref 4.8–10.8)
WBC # FLD AUTO: 4.12 K/UL — LOW (ref 4.8–10.8)

## 2023-07-06 PROCEDURE — 76856 US EXAM PELVIC COMPLETE: CPT | Mod: 26,59

## 2023-07-06 PROCEDURE — 99232 SBSQ HOSP IP/OBS MODERATE 35: CPT

## 2023-07-06 PROCEDURE — 93307 TTE W/O DOPPLER COMPLETE: CPT | Mod: 26

## 2023-07-06 PROCEDURE — 76830 TRANSVAGINAL US NON-OB: CPT | Mod: 26

## 2023-07-06 PROCEDURE — 76937 US GUIDE VASCULAR ACCESS: CPT | Mod: 26,59

## 2023-07-06 PROCEDURE — 36556 INSERT NON-TUNNEL CV CATH: CPT

## 2023-07-06 RX ORDER — PANTOPRAZOLE SODIUM 20 MG/1
40 TABLET, DELAYED RELEASE ORAL
Refills: 0 | Status: DISCONTINUED | OUTPATIENT
Start: 2023-07-06 | End: 2023-07-10

## 2023-07-06 RX ORDER — HEPARIN SODIUM 5000 [USP'U]/ML
5000 INJECTION INTRAVENOUS; SUBCUTANEOUS EVERY 12 HOURS
Refills: 0 | Status: DISCONTINUED | OUTPATIENT
Start: 2023-07-06 | End: 2023-07-10

## 2023-07-06 RX ADMIN — Medication 0.25 MILLIGRAM(S): at 17:44

## 2023-07-06 RX ADMIN — PANTOPRAZOLE SODIUM 40 MILLIGRAM(S): 20 TABLET, DELAYED RELEASE ORAL at 06:18

## 2023-07-06 RX ADMIN — HEPARIN SODIUM 5000 UNIT(S): 5000 INJECTION INTRAVENOUS; SUBCUTANEOUS at 17:24

## 2023-07-06 RX ADMIN — PANTOPRAZOLE SODIUM 40 MILLIGRAM(S): 20 TABLET, DELAYED RELEASE ORAL at 17:45

## 2023-07-06 RX ADMIN — Medication 10 MILLIGRAM(S): at 06:18

## 2023-07-06 RX ADMIN — Medication 10 MILLIGRAM(S): at 17:24

## 2023-07-06 RX ADMIN — CASPOFUNGIN ACETATE 260 MILLIGRAM(S): 7 INJECTION, POWDER, LYOPHILIZED, FOR SOLUTION INTRAVENOUS at 11:04

## 2023-07-06 NOTE — PROGRESS NOTE ADULT - SUBJECTIVE AND OBJECTIVE BOX
48yFemale  Being followed for dysphagia   Interval history: Patient denies nausea, vomiting, hematemesis, melena, blood in stool, diarrhea, constipation, abdominal pain.      PAST MEDICAL & SURGICAL HISTORY:   Anxiety      Lower back pain      HLD (hyperlipidemia)      HTN (hypertension)  borderline -no meds      Hypothyroidism      OA (osteoarthritis)      H/O gastric ulcer      Obesity  bmi  47.6      Depression      GRECIA on CPAP      H/O thalassemia minor      Candida esophagitis      History of thrush      H/O tubal ligation      S/P tonsillectomy      History of D&C  HYSTEROSCOPY-AUG 2019      H/O umbilical hernia repair                Social History: No smoking. No alcohol. No illegal drug use.            MEDICATIONS  (STANDING):  busPIRone 10 milliGRAM(s) Oral two times a day  caspofungin IVPB      caspofungin IVPB 50 milliGRAM(s) IV Intermittent every 24 hours  chlorhexidine 4% Liquid 1 Application(s) Topical <User Schedule>  heparin   Injectable 5000 Unit(s) SubCutaneous every 12 hours  pantoprazole    Tablet 40 milliGRAM(s) Oral before breakfast    MEDICATIONS  (PRN):  acetaminophen     Tablet .. 650 milliGRAM(s) Oral every 6 hours PRN Temp greater or equal to 38C (100.4F), Mild Pain (1 - 3)  aluminum hydroxide/magnesium hydroxide/simethicone Suspension 30 milliLiter(s) Oral every 4 hours PRN Dyspepsia  clonazePAM  Tablet 0.25 milliGRAM(s) Oral daily PRN for anxiety  melatonin 3 milliGRAM(s) Oral at bedtime PRN Insomnia  ondansetron Injectable 4 milliGRAM(s) IV Push every 8 hours PRN Nausea and/or Vomiting      Allergies:   No Known Allergies            REVIEW OF SYSTEMS:  General:  No weight loss, fevers, or chills.  Eyes:  No reported pain or visual changes  ENT:  No sore throat or runny nose.  NECK: No stiffness   CV:  No chest pain or palpitations.  Resp:  No shortness of breath, cough  GI:  No abdominal pain, nausea, vomiting, +dysphagia, no diarrhea or constipation. No rectal bleeding, melena, or hematemesis.  Neuro:  No tingling, numbness           VITAL SIGNS:   T(F): 96.1 (07-06-23 @ 13:12), Max: 98.8 (07-05-23 @ 21:05)  HR: 63 (07-06-23 @ 13:12) (60 - 65)  BP: 107/55 (07-06-23 @ 13:12) (101/62 - 107/55)  RR: 18 (07-06-23 @ 13:12) (18 - 18)  SpO2: 99% (07-06-23 @ 13:12) (99% - 99%)    PHYSICAL EXAM:  GENERAL: AAOx3, no acute distress.  HEAD:  Atraumatic, Normocephalic  EYES: conjunctiva and sclera clear  NECK: Supple, no JVD or thyromegaly  CHEST/LUNG: Clear to auscultation bilaterally; No wheeze, rhonchi, or rales  HEART: Regular rate and rhythm; normal S1, S2, No murmurs.  ABDOMEN: Soft, nontender, nondistended; Bowel sounds present  NEUROLOGY: No asterixis or tremor.   SKIN: Intact, no jaundice            LABS:                        7.3    4.12  )-----------( 217      ( 06 Jul 2023 07:19 )             26.8     07-06    144  |  107  |  10  ----------------------------<  82  4.1   |  26  |  0.5<L>    Ca    8.2<L>      06 Jul 2023 07:19    TPro  5.6<L>  /  Alb  4.0  /  TBili  <0.2  /  DBili  x   /  AST  26  /  ALT  11  /  AlkPhos  38  07-05    LIVER FUNCTIONS - ( 05 Jul 2023 07:55 )  Alb: 4.0 g/dL / Pro: 5.6 g/dL / ALK PHOS: 38 U/L / ALT: 11 U/L / AST: 26 U/L / GGT: x           PT/INR - ( 05 Jul 2023 07:55 )   PT: 11.80 sec;   INR: 1.03 ratio         PTT - ( 05 Jul 2023 07:55 )  PTT:31.4 sec    IMAGING:    < from: Xray Chest 2 Views PA/Lat (07.05.23 @ 07:57) >    ACC: [id number] EXAM:  XR CHEST PA LAT 2V   ORDERED BY: ROMAN REYES     PROCEDURE DATE:  07/05/2023          INTERPRETATION:  Clinical History / Reason for exam: Chest pain    Comparison : Chest radiograph 12/8/2020.    Technique/Positioning: PA and lateral chest radiograph.    Findings:    Support devices: None.    Cardiac/mediastinum/hilum: Unremarkable.    Lung parenchyma/Pleura: No focal consolidation, effusion or pneumothorax.    Skeleton/soft tissues: Unremarkable.    Impression:    No radiographic evidence of acute cardiopulmonary disease.        --- End of Report ---            NARDA RIVAS MD; Attending Radiologist  This document has been electronically signed. Jul 5 2023  8:33AM    < end of copied text >

## 2023-07-06 NOTE — CONSULT NOTE ADULT - SUBJECTIVE AND OBJECTIVE BOX
MAHI KEEBDE  48y, Female  Allergy: No Known Allergies      CHIEF COMPLAINT: esophageal candidiasis    LOS  1d    HPI  HPI:  Pt is a 49yo F with a pmhx obesity, gastric bypass, PUD, anxiety disorder, candida esophagitis after being on prolonged steroids for hearing loss whom reports being first diagnosed with candida esophagitis by egd 9 months ago, follows with Dr Gomez and has received several prolonged course of diflucan with intermittent improvement in sx. Pt followed up with Dr Conway last week due to recurring sx of thrush and now has chest pain and so was recommended to go ed for fungal iv abx therapy and rule out lung infection. Pt denies associated fevers, chills, dysphagia, odynophagia, sob, melena, or hematochezia. Pt reports chest pain as constant, 4/10, sternal area, no aggravating or alleviating factors. (05 Jul 2023 11:03)      INFECTIOUS DISEASE HISTORY:  ID consulted for recurrent esophageal candidiasis.     Currently ordered for:  caspofungin IVPB      caspofungin IVPB 50 milliGRAM(s) IV Intermittent every 24 hours      PMH  PAST MEDICAL & SURGICAL HISTORY:  Anxiety  Lower back pain  HLD (hyperlipidemia)  HTN (hypertension)  borderline -no meds  Hypothyroidism  OA (osteoarthritis)  H/O gastric ulcer  Obesity bmi  47.6  Depression  GRECIA on CPAP  H/O thalassemia minor  Candida esophagitis  History of thrush  H/O tubal ligation  S/P tonsillectomy      History of D&C  HYSTEROSCOPY-AUG 2019  H/O umbilical hernia repair      FAMILY HISTORY  Family history of CVA    SOCIAL HISTORY  Social History:   (05 Jul 2023 11:03)    ROS  A 10-point review of system was performed and negative except as described in the HPI.    VITALS:  T(F): 98, Max: 98.8 (07-05-23 @ 21:05)  HR: 65  BP: 101/62  RR: 18Vital Signs Last 24 Hrs  T(C): 36.7 (06 Jul 2023 05:37), Max: 37.1 (05 Jul 2023 21:05)  T(F): 98 (06 Jul 2023 05:37), Max: 98.8 (05 Jul 2023 21:05)  HR: 65 (06 Jul 2023 05:37) (58 - 76)  BP: 101/62 (06 Jul 2023 05:37) (101/62 - 113/56)  BP(mean): --  RR: 18 (06 Jul 2023 05:37) (18 - 18)  SpO2: 99% (05 Jul 2023 21:05) (92% - 100%)    Parameters below as of 05 Jul 2023 21:05  Patient On (Oxygen Delivery Method): room air    PHYSICAL EXAM:  ***    TESTS & MEASUREMENTS:                        7.3    5.59  )-----------( 231      ( 05 Jul 2023 21:31 )             27.1     07-05    135  |  101  |  12  ----------------------------<  92  4.1   |  26  |  0.5<L>    Ca    8.3<L>      05 Jul 2023 07:55    TPro  5.6<L>  /  Alb  4.0  /  TBili  <0.2  /  DBili  x   /  AST  26  /  ALT  11  /  AlkPhos  38  07-05      LIVER FUNCTIONS - ( 05 Jul 2023 07:55 )  Alb: 4.0 g/dL / Pro: 5.6 g/dL / ALK PHOS: 38 U/L / ALT: 11 U/L / AST: 26 U/L / GGT: x           Urinalysis Basic - ( 05 Jul 2023 07:55 )    Color: x / Appearance: x / SG: x / pH: x  Gluc: 92 mg/dL / Ketone: x  / Bili: x / Urobili: x   Blood: x / Protein: x / Nitrite: x   Leuk Esterase: x / RBC: x / WBC x   Sq Epi: x / Non Sq Epi: x / Bacteria: x          Lactate, Blood: 1.1 mmol/L (07-05-23 @ 07:55)    RADIOLOGY & ADDITIONAL TESTS:  I have personally reviewed the last Chest xray  CXR  Xray Chest 2 Views PA/Lat:   ACC: [id number] EXAM:  XR CHEST PA LAT 2V   ORDERED BY: ROMAN REYES     PROCEDURE DATE:  07/05/2023          INTERPRETATION:  Clinical History / Reason for exam: Chest pain    Comparison : Chest radiograph 12/8/2020.    Technique/Positioning: PA and lateral chest radiograph.    Findings:    Support devices: None.    Cardiac/mediastinum/hilum: Unremarkable.    Lung parenchyma/Pleura: No focal consolidation, effusion or pneumothorax.    Skeleton/soft tissues: Unremarkable.    Impression:    No radiographic evidence of acute cardiopulmonary disease.        --- End of Report ---            NARDA RIVAS MD; Attending Radiologist  This document has been electronically signed. Jul 5 2023  8:33AM (07-05-23 @ 07:57)      CT      CARDIOLOGY TESTING  12 Lead ECG:   Ventricular Rate 63 BPM    Atrial Rate 63 BPM    P-R Interval 190 ms    QRS Duration 102 ms    Q-T Interval 450 ms    QTC Calculation(Bazett) 460 ms    P Axis 69 degrees    R Axis 68 degrees    T Axis 55 degrees    Diagnosis Line Normal sinus rhythm  Cannot rule out Anterior infarct , age undetermined  Abnormal ECG    Confirmed by COMPA HAYS MD ([id number]) on 7/5/2023 11:53:49 AM (07-05-23 @ 07:39)      MEDICATIONS  busPIRone 10 Oral two times a day  caspofungin IVPB     caspofungin IVPB 50 IV Intermittent every 24 hours  chlorhexidine 4% Liquid 1 Topical <User Schedule>  pantoprazole    Tablet 40 Oral before breakfast    ANTIBIOTICS:  caspofungin IVPB      caspofungin IVPB 50 milliGRAM(s) IV Intermittent every 24 hours      ALLERGIES:  No Known Allergies      ASSESSMENT  48 year old female with PMH of lower back pain, HLD, HTN, hypothyroidism, obesity, gastric bypass, PUD is currently admitted for acute anemia and recurrence of esophageal candidiasis.     IMPRESSION  # Esophageal candidiasis Recurrent.   # Acute anemia, Iron deficient.   #Obesity BMI (kg/m2): 24.9  Creatinine: 0.5 (07-05-23 @ 07:55)    Height (cm): 172.7 (07-05-23 @ 12:52)  Weight (kg): 74.3 (07-05-23 @ 12:52)    RECOMMENDATIONS  This is an incomplete consult note. All final recommendations to follow after interview and examination of the patient. Please follow recommendations noted below.    If any questions, please send a message or call on [facility]  Please continue to update ID with any pertinent new laboratory or radiographic findings.   DELIO MAHI  48y, Female  Allergy: No Known Allergies      CHIEF COMPLAINT: esophageal candidiasis    LOS  1d    HPI  HPI:  Pt is a 49yo F with a pmhx obesity, gastric bypass, PUD, anxiety disorder, candida esophagitis after being on prolonged steroids for hearing loss whom reports being first diagnosed with candida esophagitis by egd 9 months ago, follows with Dr Gomez and has received several prolonged course of diflucan with intermittent improvement in sx. Pt followed up with Dr Conway last week due to recurring sx of thrush and now has chest pain and so was recommended to go ed for fungal iv abx therapy and rule out lung infection. Pt denies associated fevers, chills, dysphagia, odynophagia, sob, melena, or hematochezia. Pt reports chest pain as constant, 4/10, sternal area, no aggravating or alleviating factors. (05 Jul 2023 11:03)      INFECTIOUS DISEASE HISTORY:  ID consulted for recurrent esophageal candidiasis. As per the patient she was started on diflucan approximately a week ago and it was not helping her for the oral thrush. She also had significant vaginal thrush and felt like she is having infection in her groin and skin folds. She felt some chest discomfort yesterday and some shortness of breath.     Currently ordered for:  caspofungin IVPB      caspofungin IVPB 50 milliGRAM(s) IV Intermittent every 24 hours      PMH  PAST MEDICAL & SURGICAL HISTORY:  Anxiety  Lower back pain  HLD (hyperlipidemia)  HTN (hypertension)  borderline -no meds  Hypothyroidism  OA (osteoarthritis)  H/O gastric ulcer  Obesity bmi  47.6  Depression  GRECIA on CPAP  H/O thalassemia minor  Candida esophagitis  History of thrush  H/O tubal ligation  S/P tonsillectomy      History of D&C  HYSTEROSCOPY-AUG 2019  H/O umbilical hernia repair      FAMILY HISTORY  Family history of CVA  Significant history of autoimmune disorders in the family.   SOCIAL HISTORY  Social History:   (05 Jul 2023 11:03)    ROS  A 10-point review of system was performed and negative except as described in the HPI.    VITALS:  T(F): 98, Max: 98.8 (07-05-23 @ 21:05)  HR: 65  BP: 101/62  RR: 18Vital Signs Last 24 Hrs  T(C): 36.7 (06 Jul 2023 05:37), Max: 37.1 (05 Jul 2023 21:05)  T(F): 98 (06 Jul 2023 05:37), Max: 98.8 (05 Jul 2023 21:05)  HR: 65 (06 Jul 2023 05:37) (58 - 76)  BP: 101/62 (06 Jul 2023 05:37) (101/62 - 113/56)  BP(mean): --  RR: 18 (06 Jul 2023 05:37) (18 - 18)  SpO2: 99% (05 Jul 2023 21:05) (92% - 100%)    Parameters below as of 05 Jul 2023 21:05  Patient On (Oxygen Delivery Method): room air    PHYSICAL EXAM:  GENERAL: In NAD, AOX3  HEENT: Mild thrush visible at the back of the throat.  CHEST: clear to auscultate bilaterally   HEART: Normal S1 and S2  ABDOMEN: Soft nontender  EXTREMITIES: No edema   SKIN: No rashes  NEURO: Cranial nerves 2-12 grossly intact.     TESTS & MEASUREMENTS:                        7.3    5.59  )-----------( 231      ( 05 Jul 2023 21:31 )             27.1     07-05    135  |  101  |  12  ----------------------------<  92  4.1   |  26  |  0.5<L>    Ca    8.3<L>      05 Jul 2023 07:55    TPro  5.6<L>  /  Alb  4.0  /  TBili  <0.2  /  DBili  x   /  AST  26  /  ALT  11  /  AlkPhos  38  07-05      LIVER FUNCTIONS - ( 05 Jul 2023 07:55 )  Alb: 4.0 g/dL / Pro: 5.6 g/dL / ALK PHOS: 38 U/L / ALT: 11 U/L / AST: 26 U/L / GGT: x           Urinalysis Basic - ( 05 Jul 2023 07:55 )    Color: x / Appearance: x / SG: x / pH: x  Gluc: 92 mg/dL / Ketone: x  / Bili: x / Urobili: x   Blood: x / Protein: x / Nitrite: x   Leuk Esterase: x / RBC: x / WBC x   Sq Epi: x / Non Sq Epi: x / Bacteria: x    Lactate, Blood: 1.1 mmol/L (07-05-23 @ 07:55)    RADIOLOGY & ADDITIONAL TESTS:  I have personally reviewed the last Chest xray  CXR  Xray Chest 2 Views PA/Lat:   ACC: [id number] EXAM:  XR CHEST PA LAT 2V   ORDERED BY: ROMAN REYES     PROCEDURE DATE:  07/05/2023          INTERPRETATION:  Clinical History / Reason for exam: Chest pain    Comparison : Chest radiograph 12/8/2020.    Technique/Positioning: PA and lateral chest radiograph.    Findings:    Support devices: None.    Cardiac/mediastinum/hilum: Unremarkable.    Lung parenchyma/Pleura: No focal consolidation, effusion or pneumothorax.    Skeleton/soft tissues: Unremarkable.    Impression:    No radiographic evidence of acute cardiopulmonary disease.        --- End of Report ---            NARDA RIVAS MD; Attending Radiologist  This document has been electronically signed. Jul 5 2023  8:33AM (07-05-23 @ 07:57)      CT      CARDIOLOGY TESTING  12 Lead ECG:   Ventricular Rate 63 BPM    Atrial Rate 63 BPM    P-R Interval 190 ms    QRS Duration 102 ms    Q-T Interval 450 ms    QTC Calculation(Bazett) 460 ms    P Axis 69 degrees    R Axis 68 degrees    T Axis 55 degrees    Diagnosis Line Normal sinus rhythm  Cannot rule out Anterior infarct , age undetermined  Abnormal ECG    Confirmed by COMPA HAYS MD ([id number]) on 7/5/2023 11:53:49 AM (07-05-23 @ 07:39)      MEDICATIONS  busPIRone 10 Oral two times a day  caspofungin IVPB     caspofungin IVPB 50 IV Intermittent every 24 hours  chlorhexidine 4% Liquid 1 Topical <User Schedule>  pantoprazole    Tablet 40 Oral before breakfast    ANTIBIOTICS:  caspofungin IVPB      caspofungin IVPB 50 milliGRAM(s) IV Intermittent every 24 hours      ALLERGIES:  No Known Allergies      ASSESSMENT  48 year old female with PMH of lower back pain, HLD, HTN, hypothyroidism, obesity, gastric bypass, PUD is currently admitted for acute anemia and recurrence of esophageal candidiasis.     IMPRESSION  # Esophageal candidiasis Recurrent.   # Acute anemia, Iron deficient.   Creatinine: 0.5 (07-05-23 @ 07:55)    Height (cm): 172.7 (07-05-23 @ 12:52)  Weight (kg): 74.3 (07-05-23 @ 12:52)    RECOMMENDATIONS  -Recommend following up with the throat swab cultures.   -Follow up with the HIV screen.   -Recommend following up with the GI team, if she needs an EGD to rule out other etiologies for acute anemia such as inflammatory esophagitis. Can consider repeating H.pylori studies. May need biopsies for fungal cultures and susceptibilities.   -For now continue with caspofungin IV 50 mg (Started on 7/5)     If any questions, please send a message or call on [employer]  Please continue to update ID with any pertinent new laboratory or radiographic findings.

## 2023-07-06 NOTE — PROGRESS NOTE ADULT - ASSESSMENT
48yFemale pmh obesity, gastric bypass, PUD, anxiety disorder, candida esophagitis s/p one month of diflucan after being on prolonged steroids for heating loss presents for dysphagia again, patient reports she was having candidal thrush, vaginal candidiasis.    EGD 11/17/22 Dr. Conway  Impressions  -Esophagitis  -esophageal candidiasis   -acute gastritis s/p gastric bypass  path shows chronic gastritis and HP negative     Problem 1-Esophageal candidiasis  dysphagia  Rec  -appreciate ID consult caspofungin started   -PPI IV BID  -EGD records as above    Problem 2-anemia no gross GI bleeding  RBC normal, microcytic anemia  patient with thalassemia , brown stool on rectal exam  Rec  -consider Colonoscopy as outpatient   -hematology consult  -Maintain Hemodynamic Stability   -Monitor CBC  -CMP,Optimize Electrolytes  -PT,PTT,INR  -EKG, Chest-Xray   -Transfuse prn to hgb >8  -Two large bore IV lines  -Continue PPI BID  -Monitor Vital Signs  -Keep patient NPO  -Monitor Stool For blood, frequency, consistency, melena  -Active Type and Screen  -Iron Studies, Folate, Vitamin B12 levels     Problem 3-new onset vaginal bleeding unexpected  Rec  - Care as per primary team     Recall GI if needed

## 2023-07-06 NOTE — PROGRESS NOTE ADULT - SUBJECTIVE AND OBJECTIVE BOX
MEDICINE ATTENDING PROGRESS NOTE  Pt is a 47yo F with a pmhx obesity, gastric bypass, PUD, anxiety disorder, candida esophagitis after being on prolonged steroids for hearing loss whom reports being first diagnosed with candida esophagitis by egd 9 months ago, follows with Dr Gomez and has received several prolonged course of diflucan with intermittent improvement in sx. Pt followed up with Dr Conway last week due to recurring sx of thrush and now has chest pain and so was recommended to go ed for fungal iv abx therapy and rule out lung infection. Pt denies associated fevers, chills, dysphagia, odynophagia, sob, melena, or hematochezia. Pt reports chest pain as constant, 4/10, sternal area, no aggravating or alleviating factors..  (05 Jul 2023 11:03)      Interval/Overnight Events        ROS  General: Denies fevers, chills, nightsweats, weight loss  HEENT: Denies headache, rhinorrhea, sore throat, eye pain  CV: Denies CP, palpitations  PULM: Denies SOB, cough  GI: Denies abdominal pain, diarrhea  : Denies dysuria, hematuria  MSK: Denies arthralgias  SKIN: Denies rash   NEURO: Denies paresthesias, weakness  PSYCH: Denies depression    MEDICATIONS  (STANDING):  busPIRone 10 milliGRAM(s) Oral two times a day  caspofungin IVPB      caspofungin IVPB 50 milliGRAM(s) IV Intermittent every 24 hours  chlorhexidine 4% Liquid 1 Application(s) Topical <User Schedule>  pantoprazole    Tablet 40 milliGRAM(s) Oral before breakfast    MEDICATIONS  (PRN):  acetaminophen     Tablet .. 650 milliGRAM(s) Oral every 6 hours PRN Temp greater or equal to 38C (100.4F), Mild Pain (1 - 3)  aluminum hydroxide/magnesium hydroxide/simethicone Suspension 30 milliLiter(s) Oral every 4 hours PRN Dyspepsia  clonazePAM  Tablet 0.25 milliGRAM(s) Oral daily PRN for anxiety  melatonin 3 milliGRAM(s) Oral at bedtime PRN Insomnia  ondansetron Injectable 4 milliGRAM(s) IV Push every 8 hours PRN Nausea and/or Vomiting    ANTIBIOTICS:  caspofungin IVPB      caspofungin IVPB 50 milliGRAM(s) IV Intermittent every 24 hours      VITALS:  T(F): 98, Max: 98.8 (07-05-23 @ 21:05)  HR: 65  BP: 101/62  RR: 18Vital Signs Last 24 Hrs  T(C): 36.7 (06 Jul 2023 05:37), Max: 37.1 (05 Jul 2023 21:05)  T(F): 98 (06 Jul 2023 05:37), Max: 98.8 (05 Jul 2023 21:05)  HR: 65 (06 Jul 2023 05:37) (58 - 76)  BP: 101/62 (06 Jul 2023 05:37) (101/62 - 113/56)  BP(mean): --  RR: 18 (06 Jul 2023 05:37) (18 - 18)  SpO2: 99% (05 Jul 2023 21:05) (92% - 100%)    Parameters below as of 05 Jul 2023 21:05  Patient On (Oxygen Delivery Method): room air     I&O's Summary    PHYSICAL EXAM:  Gen: NAD, resting in bed  HEENT: Normocephalic, atraumatic  Neck: supple, no lymphadenopathy  CV: Regular rate & regular rhythm  Lungs: CTABL no wheeze  Abdomen: Soft, NTND+ BS present  Ext: Warm, well perfused no CCE  Neuro: non focal, awake, CN II-XII intact   Skin: no rash, no erythema  Psych: no SI, HI, Hallucination     LABS/MICROBIOLOGY:                     7.3    5.59  )-----------( 231      ( 05 Jul 2023 21:31 )             27.1     07-05    135  |  101  |  12  ----------------------------<  92  4.1   |  26  |  0.5<L>    Ca    8.3<L>      05 Jul 2023 07:55    TPro  5.6<L>  /  Alb  4.0  /  TBili  <0.2  /  DBili  x   /  AST  26  /  ALT  11  /  AlkPhos  38  07-05      LIVER FUNCTIONS - ( 05 Jul 2023 07:55 )  Alb: 4.0 g/dL / Pro: 5.6 g/dL / ALK PHOS: 38 U/L / ALT: 11 U/L / AST: 26 U/L / GGT: x           PT/INR - ( 05 Jul 2023 07:55 )   PT: 11.80 sec;   INR: 1.03 ratio    PTT - ( 05 Jul 2023 07:55 )  PTT:31.4 sec    MICROBIOLOGY  Urinalysis Basic - ( 05 Jul 2023 07:55 )  Color: x / Appearance: x / SG: x / pH: x  Gluc: 92 mg/dL / Ketone: x  / Bili: x / Urobili: x   Blood: x / Protein: x / Nitrite: x   Leuk Esterase: x / RBC: x / WBC x   Sq Epi: x / Non Sq Epi: x / Bacteria: x    Lactate, Blood: 1.1 mmol/L (07-05-23 @ 07:55)      IMAGING:  Xray Chest 2 Views PA/Lat:   Cardiac/mediastinum/hilum: Unremarkable.  Lung parenchyma/Pleura: No focal consolidation, effusion or pneumothorax.  Skeleton/soft tissues: Unremarkable.  Impression:  No radiographic evidence of acute cardiopulmonary disease.      CARDIOLOGY TESTING  12 Lead ECG:   Ventricular Rate 63 BPM  Atrial Rate 63 BPM  P-R Interval 190 ms  QRS Duration 102 ms  Q-T Interval 450 ms  QTC Calculation(Bazett) 460 ms  P Axis 69 degrees  R Axis 68 degrees  T Axis 55 degrees    Diagnosis Line Normal sinus rhythm  Cannot rule out Anterior infarct , age undetermined  Abnormal ECG    ASSESSMENT/PLAN:  Pt is a 47yo F with a pmhx obesity, gastric bypass, PUD, anxiety disorder, candida esophagitis after being on prolonged steroids for hearing loss whom reports being first diagnosed with candida esophagitis by egd 9 months ago, follows with Dr Gomez and has received several prolonged course of diflucan with intermittent improvement in sx. Pt followed up with Dr Conway last week due to recurring sx of thrush and now has chest pain and so was recommended to go ed for fungal iv abx therapy and rule out lung infection. Pt denies associated fevers, chills, dysphagia, odynophagia, sob, melena, or hematochezia. Pt reports chest pain as constant, 4/10, sternal area, no aggravating or alleviating factors.    #candida esophagitis, thrush  -throat cx  -iv antifungal therapy  -f/u fungal bld cx  -f/u bld cx x2  -ID consult  -GI consult  -d/w DR Barnhart    #anemia, r/o GIB, hx of PUD or possibly from esophagitis   -trend hgb  -transfuse 2u prbc  -IV PPI bid  -GI consult  -IVF  -f/u anemia workup sent in ED    #anxiety disorder  -c/w clonazepam and buspar    #Supportive Management:  Dispo:   DVT Ppx: Heparin sq  GI Ppx: pantoprazole    Tablet 40 milliGRAM(s) Oral before breakfast  Diet:  Diet, Regular (07-05-23 @ 11:02) [Active]      Total time spent to complete patient's bedside assessment, review medical chart, discuss medical plan of care with covering medical team was more than 45 minutes with >50% of time spent face to face with patient, discussion with patient/family and/or coordination of care    Fabien Bright MD/Pina  Attending Physician MEDICINE ATTENDING PROGRESS NOTE  Pt is a 47yo F with a pmhx obesity, gastric bypass, PUD, anxiety disorder, candida esophagitis after being on prolonged steroids for hearing loss whom reports being first diagnosed with candida esophagitis by egd 9 months ago, follows with Dr Gomez and has received several prolonged course of diflucan with intermittent improvement in sx. Pt followed up with Dr Conway last week due to recurring sx of thrush and now has chest pain and so was recommended to go ed for fungal iv abx therapy and rule out lung infection. Pt denies associated fevers, chills, dysphagia, odynophagia, sob, melena, or hematochezia. Pt reports chest pain as constant, 4/10, sternal area, no aggravating or alleviating factors..  (05 Jul 2023 11:03)      Interval/Overnight Events    - reported vaginal bleed. Had her period last week -> will get Pelvic US and TVUS. Pending work up, will call GYN  - Will send HIV given candida esophagitis    ROS  General: Denies fevers, chills, nightsweats, weight loss  HEENT: Denies headache, rhinorrhea, sore throat, eye pain  CV: Denies CP, palpitations  PULM: Denies SOB, cough  GI: Denies abdominal pain, diarrhea  : Denies dysuria, hematuria  MSK: Denies arthralgias  SKIN: Denies rash   NEURO: Denies paresthesias, weakness  PSYCH: Denies depression    MEDICATIONS  (STANDING):  busPIRone 10 milliGRAM(s) Oral two times a day  caspofungin IVPB      caspofungin IVPB 50 milliGRAM(s) IV Intermittent every 24 hours  chlorhexidine 4% Liquid 1 Application(s) Topical <User Schedule>  pantoprazole    Tablet 40 milliGRAM(s) Oral before breakfast    MEDICATIONS  (PRN):  acetaminophen     Tablet .. 650 milliGRAM(s) Oral every 6 hours PRN Temp greater or equal to 38C (100.4F), Mild Pain (1 - 3)  aluminum hydroxide/magnesium hydroxide/simethicone Suspension 30 milliLiter(s) Oral every 4 hours PRN Dyspepsia  clonazePAM  Tablet 0.25 milliGRAM(s) Oral daily PRN for anxiety  melatonin 3 milliGRAM(s) Oral at bedtime PRN Insomnia  ondansetron Injectable 4 milliGRAM(s) IV Push every 8 hours PRN Nausea and/or Vomiting    ANTIBIOTICS:  caspofungin IVPB      caspofungin IVPB 50 milliGRAM(s) IV Intermittent every 24 hours      VITALS:  T(F): 98, Max: 98.8 (07-05-23 @ 21:05)  HR: 65  BP: 101/62  RR: 18Vital Signs Last 24 Hrs  T(C): 36.7 (06 Jul 2023 05:37), Max: 37.1 (05 Jul 2023 21:05)  T(F): 98 (06 Jul 2023 05:37), Max: 98.8 (05 Jul 2023 21:05)  HR: 65 (06 Jul 2023 05:37) (58 - 76)  BP: 101/62 (06 Jul 2023 05:37) (101/62 - 113/56)  BP(mean): --  RR: 18 (06 Jul 2023 05:37) (18 - 18)  SpO2: 99% (05 Jul 2023 21:05) (92% - 100%)    Parameters below as of 05 Jul 2023 21:05  Patient On (Oxygen Delivery Method): room air     I&O's Summary    PHYSICAL EXAM:  Gen: NAD, resting in bed  HEENT: Normocephalic, atraumatic  Neck: supple, no lymphadenopathy  CV: Regular rate & regular rhythm  Lungs: CTABL no wheeze  Abdomen: Soft, NTND+ BS present  Ext: Warm, well perfused no CCE  Neuro: non focal, awake, CN II-XII intact   Skin: no rash, no erythema  Psych: no SI, HI, Hallucination     LABS/MICROBIOLOGY:                     7.3    5.59  )-----------( 231      ( 05 Jul 2023 21:31 )             27.1     07-05    135  |  101  |  12  ----------------------------<  92  4.1   |  26  |  0.5<L>    Ca    8.3<L>      05 Jul 2023 07:55    TPro  5.6<L>  /  Alb  4.0  /  TBili  <0.2  /  DBili  x   /  AST  26  /  ALT  11  /  AlkPhos  38  07-05      LIVER FUNCTIONS - ( 05 Jul 2023 07:55 )  Alb: 4.0 g/dL / Pro: 5.6 g/dL / ALK PHOS: 38 U/L / ALT: 11 U/L / AST: 26 U/L / GGT: x           PT/INR - ( 05 Jul 2023 07:55 )   PT: 11.80 sec;   INR: 1.03 ratio    PTT - ( 05 Jul 2023 07:55 )  PTT:31.4 sec    MICROBIOLOGY  Urinalysis Basic - ( 05 Jul 2023 07:55 )  Color: x / Appearance: x / SG: x / pH: x  Gluc: 92 mg/dL / Ketone: x  / Bili: x / Urobili: x   Blood: x / Protein: x / Nitrite: x   Leuk Esterase: x / RBC: x / WBC x   Sq Epi: x / Non Sq Epi: x / Bacteria: x    Lactate, Blood: 1.1 mmol/L (07-05-23 @ 07:55)      IMAGING:  Xray Chest 2 Views PA/Lat:   Cardiac/mediastinum/hilum: Unremarkable.  Lung parenchyma/Pleura: No focal consolidation, effusion or pneumothorax.  Skeleton/soft tissues: Unremarkable.  Impression:  No radiographic evidence of acute cardiopulmonary disease.      CARDIOLOGY TESTING  12 Lead ECG:   Ventricular Rate 63 BPM  Atrial Rate 63 BPM  P-R Interval 190 ms  QRS Duration 102 ms  Q-T Interval 450 ms  QTC Calculation(Bazett) 460 ms  P Axis 69 degrees  R Axis 68 degrees  T Axis 55 degrees    Diagnosis Line Normal sinus rhythm  Cannot rule out Anterior infarct , age undetermined  Abnormal ECG    ASSESSMENT/PLAN:  Pt is a 47yo F with a pmhx obesity, gastric bypass, PUD, anxiety disorder, candida esophagitis after being on prolonged steroids for hearing loss whom reports being first diagnosed with candida esophagitis by egd 9 months ago, follows with Dr Gomez and has received several prolonged course of diflucan with intermittent improvement in sx. Pt followed up with Dr Conway last week due to recurring sx of thrush and now has chest pain and so was recommended to go ed for fungal iv abx therapy and rule out lung infection. Pt denies associated fevers, chills, dysphagia, odynophagia, sob, melena, or hematochezia. Pt reports chest pain as constant, 4/10, sternal area, no aggravating or alleviating factors.    #candida esophagitis, thrush  -throat cx  -iv antifungal therapy  -f/u fungal bld cx  -f/u bld cx x2  -ID consult  -GI consult  -d/w DR Barnhart    #anemia, r/o GIB, hx of PUD or possibly from esophagitis   -trend hgb  -transfuse 2u prbc  -IV PPI bid  -GI consult  -IVF  -f/u anemia workup sent in ED    #anxiety disorder  -c/w clonazepam and buspar    #Supportive Management:  Dispo:   DVT Ppx: Heparin sq  GI Ppx: pantoprazole    Tablet 40 milliGRAM(s) Oral before breakfast  Diet:  Diet, Regular (07-05-23 @ 11:02) [Active]      Total time spent to complete patient's bedside assessment, review medical chart, discuss medical plan of care with covering medical team was more than 45 minutes with >50% of time spent face to face with patient, discussion with patient/family and/or coordination of care    Fabien Bright MD/Pina  Attending Physician MEDICINE ATTENDING PROGRESS NOTE  Pt is a 49yo F with a pmhx obesity, gastric bypass, PUD, anxiety disorder, candida esophagitis after being on prolonged steroids for hearing loss whom reports being first diagnosed with candida esophagitis by egd 9 months ago, follows with Dr Gomez and has received several prolonged course of diflucan with intermittent improvement in sx. Pt followed up with Dr Conway last week due to recurring sx of thrush and now has chest pain and so was recommended to go ed for fungal iv abx therapy and rule out lung infection. Pt denies associated fevers, chills, dysphagia, odynophagia, sob, melena, or hematochezia. Pt reports chest pain as constant, 4/10, sternal area, no aggravating or alleviating factors..  (05 Jul 2023 11:03)      Interval/Overnight Events  - pt reported CP in setting of Acute Blood Loss Anemia requiring transfusion, but will r/o ACS. Will get 2D echo, CTA heart  - reported vaginal bleed. Had her period last week -> will get Pelvic US and TVUS. Pending work up, will call GYN  - Will send HIV given candida esophagitis    ROS  General: Denies fevers, chills, nightsweats, weight loss  HEENT: Denies headache, rhinorrhea, sore throat, eye pain  CV: Denies CP, palpitations  PULM: Denies SOB, cough  GI: Denies abdominal pain, diarrhea  : Denies dysuria, hematuria  MSK: Denies arthralgias  SKIN: Denies rash   NEURO: Denies paresthesias, weakness  PSYCH: Denies depression    MEDICATIONS  (STANDING):  busPIRone 10 milliGRAM(s) Oral two times a day  caspofungin IVPB      caspofungin IVPB 50 milliGRAM(s) IV Intermittent every 24 hours  chlorhexidine 4% Liquid 1 Application(s) Topical <User Schedule>  pantoprazole    Tablet 40 milliGRAM(s) Oral before breakfast    MEDICATIONS  (PRN):  acetaminophen     Tablet .. 650 milliGRAM(s) Oral every 6 hours PRN Temp greater or equal to 38C (100.4F), Mild Pain (1 - 3)  aluminum hydroxide/magnesium hydroxide/simethicone Suspension 30 milliLiter(s) Oral every 4 hours PRN Dyspepsia  clonazePAM  Tablet 0.25 milliGRAM(s) Oral daily PRN for anxiety  melatonin 3 milliGRAM(s) Oral at bedtime PRN Insomnia  ondansetron Injectable 4 milliGRAM(s) IV Push every 8 hours PRN Nausea and/or Vomiting    ANTIBIOTICS:  caspofungin IVPB      caspofungin IVPB 50 milliGRAM(s) IV Intermittent every 24 hours      VITALS:  T(F): 98, Max: 98.8 (07-05-23 @ 21:05)  HR: 65  BP: 101/62  RR: 18Vital Signs Last 24 Hrs  T(C): 36.7 (06 Jul 2023 05:37), Max: 37.1 (05 Jul 2023 21:05)  T(F): 98 (06 Jul 2023 05:37), Max: 98.8 (05 Jul 2023 21:05)  HR: 65 (06 Jul 2023 05:37) (58 - 76)  BP: 101/62 (06 Jul 2023 05:37) (101/62 - 113/56)  BP(mean): --  RR: 18 (06 Jul 2023 05:37) (18 - 18)  SpO2: 99% (05 Jul 2023 21:05) (92% - 100%)    Parameters below as of 05 Jul 2023 21:05  Patient On (Oxygen Delivery Method): room air     I&O's Summary    PHYSICAL EXAM:  Gen: NAD, resting in bed  HEENT: Normocephalic, atraumatic  Neck: supple, no lymphadenopathy  CV: Regular rate & regular rhythm  Lungs: CTABL no wheeze  Abdomen: Soft, NTND+ BS present  Ext: Warm, well perfused no CCE  Neuro: non focal, awake, CN II-XII intact   Skin: no rash, no erythema  Psych: no SI, HI, Hallucination     LABS/MICROBIOLOGY:                     7.3    5.59  )-----------( 231      ( 05 Jul 2023 21:31 )             27.1     07-05    135  |  101  |  12  ----------------------------<  92  4.1   |  26  |  0.5<L>    Ca    8.3<L>      05 Jul 2023 07:55    TPro  5.6<L>  /  Alb  4.0  /  TBili  <0.2  /  DBili  x   /  AST  26  /  ALT  11  /  AlkPhos  38  07-05      LIVER FUNCTIONS - ( 05 Jul 2023 07:55 )  Alb: 4.0 g/dL / Pro: 5.6 g/dL / ALK PHOS: 38 U/L / ALT: 11 U/L / AST: 26 U/L / GGT: x           PT/INR - ( 05 Jul 2023 07:55 )   PT: 11.80 sec;   INR: 1.03 ratio    PTT - ( 05 Jul 2023 07:55 )  PTT:31.4 sec    MICROBIOLOGY  Urinalysis Basic - ( 05 Jul 2023 07:55 )  Color: x / Appearance: x / SG: x / pH: x  Gluc: 92 mg/dL / Ketone: x  / Bili: x / Urobili: x   Blood: x / Protein: x / Nitrite: x   Leuk Esterase: x / RBC: x / WBC x   Sq Epi: x / Non Sq Epi: x / Bacteria: x    Lactate, Blood: 1.1 mmol/L (07-05-23 @ 07:55)      IMAGING:  Xray Chest 2 Views PA/Lat:   Cardiac/mediastinum/hilum: Unremarkable.  Lung parenchyma/Pleura: No focal consolidation, effusion or pneumothorax.  Skeleton/soft tissues: Unremarkable.  Impression:  No radiographic evidence of acute cardiopulmonary disease.      CARDIOLOGY TESTING  12 Lead ECG:   Ventricular Rate 63 BPM  Atrial Rate 63 BPM  P-R Interval 190 ms  QRS Duration 102 ms  Q-T Interval 450 ms  QTC Calculation(Bazett) 460 ms  P Axis 69 degrees  R Axis 68 degrees  T Axis 55 degrees    Diagnosis Line Normal sinus rhythm  Cannot rule out Anterior infarct , age undetermined  Abnormal ECG    ASSESSMENT/PLAN:  Pt is a 49yo F with a pmhx obesity, gastric bypass, PUD, anxiety disorder, candida esophagitis after being on prolonged steroids for hearing loss whom reports being first diagnosed with candida esophagitis by egd 9 months ago, follows with Dr Gomez and has received several prolonged course of diflucan with intermittent improvement in sx. Pt followed up with Dr Conway last week due to recurring sx of thrush and now has chest pain and so was recommended to go ed for fungal iv abx therapy and rule out lung infection. Pt denies associated fevers, chills, dysphagia, odynophagia, sob, melena, or hematochezia. Pt reports chest pain as constant, 4/10, sternal area, no aggravating or alleviating factors.    #candida esophagitis, thrush  -throat cx  -iv antifungal therapy  -f/u fungal bld cx  -f/u bld cx x2  -ID consult  -GI consult  -d/w DR Barnhart    #anemia, r/o GIB, hx of PUD or possibly from esophagitis   -trend hgb  -transfuse 2u prbc  -IV PPI bid  -GI consult  -IVF  -f/u anemia workup sent in ED    #anxiety disorder  -c/w clonazepam and buspar    #Supportive Management:  Dispo:   DVT Ppx: Heparin sq  GI Ppx: pantoprazole    Tablet 40 milliGRAM(s) Oral before breakfast  Diet:  Diet, Regular (07-05-23 @ 11:02) [Active]      Total time spent to complete patient's bedside assessment, review medical chart, discuss medical plan of care with covering medical team was more than 45 minutes with >50% of time spent face to face with patient, discussion with patient/family and/or coordination of care    Fabien Bright MD/Pina  Attending Physician MEDICINE ATTENDING PROGRESS NOTE  Pt is a 49yo F with a pmhx obesity, gastric bypass, PUD, anxiety disorder, candida esophagitis after being on prolonged steroids for hearing loss whom reports being first diagnosed with candida esophagitis by egd 9 months ago, follows with Dr Gomez and has received several prolonged course of diflucan with intermittent improvement in sx. Pt followed up with Dr Conway last week due to recurring sx of thrush and now has chest pain and so was recommended to go ed for fungal iv abx therapy and rule out lung infection. Pt denies associated fevers, chills, dysphagia, odynophagia, sob, melena, or hematochezia. Pt reports chest pain as constant, 4/10, sternal area, no aggravating or alleviating factors..  (05 Jul 2023 11:03)    Interval/Overnight Events  - pt reported CP in setting of Acute Blood Loss Anemia requiring transfusion, but will r/o ACS. Will get 2D echo, CTA heart/Coronary artery. If neg, patient could be dc home with close follow up with PMD. If positive, pt will need inpatient stress test.  - reported vaginal bleed. Had her period last week -> will get Pelvic US and TVUS. Pending work up, will call GYN (inpatient vs outpatient)  - Will send HIV given candida esophagitis  - will transfuse to maintain hgb > 8.0    ROS  General: Denies fevers, chills, nightsweats, weight loss  HEENT: Denies headache, rhinorrhea, sore throat, eye pain  CV: Denies CP, palpitations  PULM: Denies SOB, cough  GI: Denies abdominal pain, diarrhea  : Denies dysuria, hematuria  MSK: Denies arthralgias  SKIN: Denies rash   NEURO: Denies paresthesias, weakness  PSYCH: Denies depression    MEDICATIONS  (STANDING):  busPIRone 10 milliGRAM(s) Oral two times a day  caspofungin IVPB      caspofungin IVPB 50 milliGRAM(s) IV Intermittent every 24 hours  chlorhexidine 4% Liquid 1 Application(s) Topical <User Schedule>  pantoprazole    Tablet 40 milliGRAM(s) Oral before breakfast    MEDICATIONS  (PRN):  acetaminophen     Tablet .. 650 milliGRAM(s) Oral every 6 hours PRN Temp greater or equal to 38C (100.4F), Mild Pain (1 - 3)  aluminum hydroxide/magnesium hydroxide/simethicone Suspension 30 milliLiter(s) Oral every 4 hours PRN Dyspepsia  clonazePAM  Tablet 0.25 milliGRAM(s) Oral daily PRN for anxiety  melatonin 3 milliGRAM(s) Oral at bedtime PRN Insomnia  ondansetron Injectable 4 milliGRAM(s) IV Push every 8 hours PRN Nausea and/or Vomiting    ANTIBIOTICS:  caspofungin IVPB      caspofungin IVPB 50 milliGRAM(s) IV Intermittent every 24 hours      VITALS:  T(F): 98, Max: 98.8 (07-05-23 @ 21:05)  HR: 65  BP: 101/62  RR: 18Vital Signs Last 24 Hrs  T(C): 36.7 (06 Jul 2023 05:37), Max: 37.1 (05 Jul 2023 21:05)  T(F): 98 (06 Jul 2023 05:37), Max: 98.8 (05 Jul 2023 21:05)  HR: 65 (06 Jul 2023 05:37) (58 - 76)  BP: 101/62 (06 Jul 2023 05:37) (101/62 - 113/56)  BP(mean): --  RR: 18 (06 Jul 2023 05:37) (18 - 18)  SpO2: 99% (05 Jul 2023 21:05) (92% - 100%)    Parameters below as of 05 Jul 2023 21:05  Patient On (Oxygen Delivery Method): room air     I&O's Summary    PHYSICAL EXAM:  Gen: NAD, resting in bed  HEENT: Normocephalic, atraumatic  Neck: supple, no lymphadenopathy  CV: Regular rate & regular rhythm  Lungs: CTABL no wheeze  Abdomen: Soft, NTND+ BS present  Ext: Warm, well perfused no CCE  Neuro: non focal, awake, CN II-XII intact   Skin: no rash, no erythema  Psych: no SI, HI, Hallucination     LABS/MICROBIOLOGY:                     7.3    5.59  )-----------( 231      ( 05 Jul 2023 21:31 )             27.1     07-05    135  |  101  |  12  ----------------------------<  92  4.1   |  26  |  0.5<L>    Ca    8.3<L>      05 Jul 2023 07:55    TPro  5.6<L>  /  Alb  4.0  /  TBili  <0.2  /  DBili  x   /  AST  26  /  ALT  11  /  AlkPhos  38  07-05      LIVER FUNCTIONS - ( 05 Jul 2023 07:55 )  Alb: 4.0 g/dL / Pro: 5.6 g/dL / ALK PHOS: 38 U/L / ALT: 11 U/L / AST: 26 U/L / GGT: x           PT/INR - ( 05 Jul 2023 07:55 )   PT: 11.80 sec;   INR: 1.03 ratio    PTT - ( 05 Jul 2023 07:55 )  PTT:31.4 sec    MICROBIOLOGY  Urinalysis Basic - ( 05 Jul 2023 07:55 )  Color: x / Appearance: x / SG: x / pH: x  Gluc: 92 mg/dL / Ketone: x  / Bili: x / Urobili: x   Blood: x / Protein: x / Nitrite: x   Leuk Esterase: x / RBC: x / WBC x   Sq Epi: x / Non Sq Epi: x / Bacteria: x    Lactate, Blood: 1.1 mmol/L (07-05-23 @ 07:55)      IMAGING:  Xray Chest 2 Views PA/Lat:   Cardiac/mediastinum/hilum: Unremarkable.  Lung parenchyma/Pleura: No focal consolidation, effusion or pneumothorax.  Skeleton/soft tissues: Unremarkable.  Impression:  No radiographic evidence of acute cardiopulmonary disease.      CARDIOLOGY TESTING  12 Lead ECG:   Ventricular Rate 63 BPM  Atrial Rate 63 BPM  P-R Interval 190 ms  QRS Duration 102 ms  Q-T Interval 450 ms  QTC Calculation(Bazett) 460 ms  P Axis 69 degrees  R Axis 68 degrees  T Axis 55 degrees    Diagnosis Line Normal sinus rhythm  Cannot rule out Anterior infarct , age undetermined  Abnormal ECG    ASSESSMENT/PLAN:  Pt is a 49yo F with a pmhx obesity, gastric bypass, PUD, anxiety disorder, candida esophagitis after being on prolonged steroids for hearing loss whom reports being first diagnosed with candida esophagitis by egd 9 months ago, follows with Dr Gomez and has received several prolonged course of diflucan with intermittent improvement in sx. Pt followed up with Dr Conway last week due to recurring sx of thrush and now has chest pain and so was recommended to go ed for fungal iv abx therapy and rule out lung infection. Pt denies associated fevers, chills, dysphagia, odynophagia, sob, melena, or hematochezia. Pt reports chest pain as constant, 4/10, sternal area, no aggravating or alleviating factors.    #candida esophagitis, thrush  -throat cx  -iv antifungal therapy  -f/u fungal bld cx  -f/u bld cx x2  -ID consult  -GI consult  -d/w DR Barnhart    #anemia, r/o GIB, hx of PUD or possibly from esophagitis   -trend hgb  -transfuse 2u prbc  -IV PPI bid  -GI consult  -IVF  -f/u anemia workup sent in ED    #anxiety disorder  -c/w clonazepam and buspar    #Supportive Management:  Dispo:   DVT Ppx: Heparin sq  GI Ppx: pantoprazole    Tablet 40 milliGRAM(s) Oral before breakfast  Diet:  Diet, Regular (07-05-23 @ 11:02) [Active]      Total time spent to complete patient's bedside assessment, review medical chart, discuss medical plan of care with covering medical team was more than 45 minutes with >50% of time spent face to face with patient, discussion with patient/family and/or coordination of care    Fabien Bright MD/Pina  Attending Physician MEDICINE ATTENDING PROGRESS NOTE  Pt is a 49yo F with a pmhx obesity, gastric bypass, PUD, anxiety disorder, candida esophagitis after being on prolonged steroids for hearing loss whom reports being first diagnosed with candida esophagitis by egd 9 months ago, follows with Dr Gomez and has received several prolonged course of diflucan with intermittent improvement in sx. Pt followed up with Dr Conway last week due to recurring sx of thrush and now has chest pain and so was recommended to go ed for fungal iv abx therapy and rule out lung infection. Pt denies associated fevers, chills, dysphagia, odynophagia, sob, melena, or hematochezia. Pt reports chest pain as constant, 4/10, sternal area, no aggravating or alleviating factors..  (05 Jul 2023 11:03)    Interval/Overnight Events  - pt reported CP in setting of Acute Blood Loss Anemia requiring transfusion, but will r/o ACS. Will get 2D echo, CTA heart/Coronary artery. If neg, patient could be dc home with close follow up with PMD. If positive, pt will need inpatient stress test.  - reported vaginal bleed. Had her period last week -> will get Pelvic US and TVUS. Pending work up, will call GYN (inpatient vs outpatient)  - Will send HIV given candida esophagitis  - will transfuse to maintain hgb > 8.0    ROS  General: Denies fevers, chills, nightsweats, weight loss  HEENT: Denies headache, rhinorrhea, sore throat, eye pain  CV: Denies CP, palpitations  PULM: Denies SOB, cough  GI: Denies abdominal pain, diarrhea  : Denies dysuria, hematuria  MSK: Denies arthralgias  SKIN: Denies rash   NEURO: Denies paresthesias, weakness  PSYCH: Denies depression    MEDICATIONS  (STANDING):  busPIRone 10 milliGRAM(s) Oral two times a day  caspofungin IVPB      caspofungin IVPB 50 milliGRAM(s) IV Intermittent every 24 hours  chlorhexidine 4% Liquid 1 Application(s) Topical <User Schedule>  pantoprazole    Tablet 40 milliGRAM(s) Oral before breakfast    MEDICATIONS  (PRN):  acetaminophen     Tablet .. 650 milliGRAM(s) Oral every 6 hours PRN Temp greater or equal to 38C (100.4F), Mild Pain (1 - 3)  aluminum hydroxide/magnesium hydroxide/simethicone Suspension 30 milliLiter(s) Oral every 4 hours PRN Dyspepsia  clonazePAM  Tablet 0.25 milliGRAM(s) Oral daily PRN for anxiety  melatonin 3 milliGRAM(s) Oral at bedtime PRN Insomnia  ondansetron Injectable 4 milliGRAM(s) IV Push every 8 hours PRN Nausea and/or Vomiting    ANTIBIOTICS:  caspofungin IVPB      caspofungin IVPB 50 milliGRAM(s) IV Intermittent every 24 hours      VITALS:  T(F): 98, Max: 98.8 (07-05-23 @ 21:05)  HR: 65  BP: 101/62  RR: 18Vital Signs Last 24 Hrs  T(C): 36.7 (06 Jul 2023 05:37), Max: 37.1 (05 Jul 2023 21:05)  T(F): 98 (06 Jul 2023 05:37), Max: 98.8 (05 Jul 2023 21:05)  HR: 65 (06 Jul 2023 05:37) (58 - 76)  BP: 101/62 (06 Jul 2023 05:37) (101/62 - 113/56)  BP(mean): --  RR: 18 (06 Jul 2023 05:37) (18 - 18)  SpO2: 99% (05 Jul 2023 21:05) (92% - 100%)    Parameters below as of 05 Jul 2023 21:05  Patient On (Oxygen Delivery Method): room air     I&O's Summary    PHYSICAL EXAM:  Gen: NAD, resting in bed  HEENT: Normocephalic, atraumatic  Neck: supple, no lymphadenopathy  CV: Regular rate & regular rhythm  Lungs: CTABL no wheeze  Abdomen: Soft, NTND+ BS present  Ext: Warm, well perfused no CCE  Neuro: non focal, awake, CN II-XII intact   Skin: no rash, no erythema  Psych: no SI, HI, Hallucination     LABS/MICROBIOLOGY:                     7.3    5.59  )-----------( 231      ( 05 Jul 2023 21:31 )             27.1     07-05    135  |  101  |  12  ----------------------------<  92  4.1   |  26  |  0.5<L>    Ca    8.3<L>      05 Jul 2023 07:55    TPro  5.6<L>  /  Alb  4.0  /  TBili  <0.2  /  DBili  x   /  AST  26  /  ALT  11  /  AlkPhos  38  07-05      LIVER FUNCTIONS - ( 05 Jul 2023 07:55 )  Alb: 4.0 g/dL / Pro: 5.6 g/dL / ALK PHOS: 38 U/L / ALT: 11 U/L / AST: 26 U/L / GGT: x           PT/INR - ( 05 Jul 2023 07:55 )   PT: 11.80 sec;   INR: 1.03 ratio    PTT - ( 05 Jul 2023 07:55 )  PTT:31.4 sec    MICROBIOLOGY  Urinalysis Basic - ( 05 Jul 2023 07:55 )  Color: x / Appearance: x / SG: x / pH: x  Gluc: 92 mg/dL / Ketone: x  / Bili: x / Urobili: x   Blood: x / Protein: x / Nitrite: x   Leuk Esterase: x / RBC: x / WBC x   Sq Epi: x / Non Sq Epi: x / Bacteria: x    Lactate, Blood: 1.1 mmol/L (07-05-23 @ 07:55)      IMAGING:  Xray Chest 2 Views PA/Lat:   Cardiac/mediastinum/hilum: Unremarkable.  Lung parenchyma/Pleura: No focal consolidation, effusion or pneumothorax.  Skeleton/soft tissues: Unremarkable.  Impression:  No radiographic evidence of acute cardiopulmonary disease.      CARDIOLOGY TESTING  12 Lead ECG:   Ventricular Rate 63 BPM  Atrial Rate 63 BPM  P-R Interval 190 ms  QRS Duration 102 ms  Q-T Interval 450 ms  QTC Calculation(Bazett) 460 ms  P Axis 69 degrees  R Axis 68 degrees  T Axis 55 degrees    Diagnosis Line Normal sinus rhythm  Cannot rule out Anterior infarct , age undetermined  Abnormal ECG    ASSESSMENT/PLAN:  Pt is a 49yo F with a pmhx obesity, gastric bypass, PUD, anxiety disorder, candida esophagitis after being on prolonged steroids for hearing loss whom reports being first diagnosed with candida esophagitis by egd 9 months ago, follows with Dr Gomez and has received several prolonged course of diflucan with intermittent improvement in sx. Pt followed up with Dr Conway last week due to recurring sx of thrush and now has chest pain and so was recommended to go ed for fungal iv abx therapy and rule out lung infection. Pt denies associated fevers, chills, dysphagia, odynophagia, sob, melena, or hematochezia. Pt reports chest pain as constant, 4/10, sternal area, no aggravating or alleviating factors.    #candida esophagitis, thrush  -throat cx  -iv antifungal therapy  -f/u fungal bld cx  -f/u bld cx x2  -ID consult  -GI consult  -d/w DR Barnhart    #Chest Pain due to Acute Blood Loss Anemia, r/o cardiac etiology  #Acute Blood Loss Anemia, r/o GIB, hx of PUD or possibly from esophagitis   -trend hgb  -transfuse 2u prbc  -IV PPI bid  - will get 2D echo, CTA Heart/Coronary Artery  -GI consult  -IVF  -f/u anemia workup sent in ED    #anxiety disorder  -c/w clonazepam and buspar    #Supportive Management:  Dispo:   DVT Ppx: Heparin sq  GI Ppx: pantoprazole    Tablet 40 milliGRAM(s) Oral before breakfast  Diet:  Diet, Regular (07-05-23 @ 11:02) [Active]      Total time spent to complete patient's bedside assessment, review medical chart, discuss medical plan of care with covering medical team was more than 45 minutes with >50% of time spent face to face with patient, discussion with patient/family and/or coordination of care    Fabien Bright MD/Pina  Attending Physician MEDICINE ATTENDING PROGRESS NOTE  Pt is a 49yo F with a pmhx obesity, gastric bypass, PUD, anxiety disorder, candida esophagitis after being on prolonged steroids for hearing loss whom reports being first diagnosed with candida esophagitis by egd 9 months ago, follows with Dr Gomez and has received several prolonged course of diflucan with intermittent improvement in sx. Pt followed up with Dr Conway last week due to recurring sx of thrush and now has chest pain and so was recommended to go ed for fungal iv abx therapy and rule out lung infection. Pt denies associated fevers, chills, dysphagia, odynophagia, sob, melena, or hematochezia. Pt reports chest pain as constant, 4/10, sternal area, no aggravating or alleviating factors..  (05 Jul 2023 11:03)    Interval/Overnight Events  - pt reported CP in setting of Acute Blood Loss Anemia requiring transfusion, but will r/o ACS. Will get 2D echo, CTA heart/Coronary artery. If neg, patient could be dc home with close follow up with PMD. If positive, pt will need inpatient stress test.  - reported vaginal bleed. Had her period last week -> will get Pelvic US and TVUS. Pending work up, will call GYN (inpatient vs outpatient)  - Will send HIV given candida esophagitis  - will transfuse to maintain hgb > 8.0    ROS  General: Denies fevers, chills, nightsweats, weight loss  HEENT: Denies headache, rhinorrhea, sore throat, eye pain  CV: Denies CP, palpitations  PULM: Denies SOB, cough  GI: Denies abdominal pain, diarrhea  : Denies dysuria, hematuria  MSK: Denies arthralgias  SKIN: Denies rash   NEURO: Denies paresthesias, weakness  PSYCH: Denies depression    MEDICATIONS  (STANDING):  busPIRone 10 milliGRAM(s) Oral two times a day  caspofungin IVPB      caspofungin IVPB 50 milliGRAM(s) IV Intermittent every 24 hours  chlorhexidine 4% Liquid 1 Application(s) Topical <User Schedule>  pantoprazole    Tablet 40 milliGRAM(s) Oral before breakfast    MEDICATIONS  (PRN):  acetaminophen     Tablet .. 650 milliGRAM(s) Oral every 6 hours PRN Temp greater or equal to 38C (100.4F), Mild Pain (1 - 3)  aluminum hydroxide/magnesium hydroxide/simethicone Suspension 30 milliLiter(s) Oral every 4 hours PRN Dyspepsia  clonazePAM  Tablet 0.25 milliGRAM(s) Oral daily PRN for anxiety  melatonin 3 milliGRAM(s) Oral at bedtime PRN Insomnia  ondansetron Injectable 4 milliGRAM(s) IV Push every 8 hours PRN Nausea and/or Vomiting    ANTIBIOTICS:  caspofungin IVPB      caspofungin IVPB 50 milliGRAM(s) IV Intermittent every 24 hours      VITALS:  T(F): 98, Max: 98.8 (07-05-23 @ 21:05)  HR: 65  BP: 101/62  RR: 18Vital Signs Last 24 Hrs  T(C): 36.7 (06 Jul 2023 05:37), Max: 37.1 (05 Jul 2023 21:05)  T(F): 98 (06 Jul 2023 05:37), Max: 98.8 (05 Jul 2023 21:05)  HR: 65 (06 Jul 2023 05:37) (58 - 76)  BP: 101/62 (06 Jul 2023 05:37) (101/62 - 113/56)  BP(mean): --  RR: 18 (06 Jul 2023 05:37) (18 - 18)  SpO2: 99% (05 Jul 2023 21:05) (92% - 100%)    Parameters below as of 05 Jul 2023 21:05  Patient On (Oxygen Delivery Method): room air     I&O's Summary    PHYSICAL EXAM:  Gen: NAD, resting in bed  HEENT: Normocephalic, atraumatic  Neck: supple, no lymphadenopathy  CV: Regular rate & regular rhythm  Lungs: CTABL no wheeze  Abdomen: Soft, NTND+ BS present  Ext: Warm, well perfused no CCE  Neuro: non focal, awake, CN II-XII intact   Skin: no rash, no erythema  Psych: no SI, HI, Hallucination     LABS/MICROBIOLOGY:                     7.3    5.59  )-----------( 231      ( 05 Jul 2023 21:31 )             27.1     07-05    135  |  101  |  12  ----------------------------<  92  4.1   |  26  |  0.5<L>    Ca    8.3<L>      05 Jul 2023 07:55    TPro  5.6<L>  /  Alb  4.0  /  TBili  <0.2  /  DBili  x   /  AST  26  /  ALT  11  /  AlkPhos  38  07-05      LIVER FUNCTIONS - ( 05 Jul 2023 07:55 )  Alb: 4.0 g/dL / Pro: 5.6 g/dL / ALK PHOS: 38 U/L / ALT: 11 U/L / AST: 26 U/L / GGT: x           PT/INR - ( 05 Jul 2023 07:55 )   PT: 11.80 sec;   INR: 1.03 ratio    PTT - ( 05 Jul 2023 07:55 )  PTT:31.4 sec    MICROBIOLOGY  Urinalysis Basic - ( 05 Jul 2023 07:55 )  Color: x / Appearance: x / SG: x / pH: x  Gluc: 92 mg/dL / Ketone: x  / Bili: x / Urobili: x   Blood: x / Protein: x / Nitrite: x   Leuk Esterase: x / RBC: x / WBC x   Sq Epi: x / Non Sq Epi: x / Bacteria: x    Lactate, Blood: 1.1 mmol/L (07-05-23 @ 07:55)      IMAGING:  Xray Chest 2 Views PA/Lat:   Cardiac/mediastinum/hilum: Unremarkable.  Lung parenchyma/Pleura: No focal consolidation, effusion or pneumothorax.  Skeleton/soft tissues: Unremarkable.  Impression:  No radiographic evidence of acute cardiopulmonary disease.      CARDIOLOGY TESTING  12 Lead ECG:   Ventricular Rate 63 BPM  Atrial Rate 63 BPM  P-R Interval 190 ms  QRS Duration 102 ms  Q-T Interval 450 ms  QTC Calculation(Bazett) 460 ms  P Axis 69 degrees  R Axis 68 degrees  T Axis 55 degrees    Diagnosis Line Normal sinus rhythm  Cannot rule out Anterior infarct , age undetermined  Abnormal ECG    ASSESSMENT/PLAN:  Pt is a 49yo F with a pmhx obesity, gastric bypass, PUD, anxiety disorder, candida esophagitis after being on prolonged steroids for hearing loss whom reports being first diagnosed with candida esophagitis by egd 9 months ago, follows with Dr Gomez and has received several prolonged course of diflucan with intermittent improvement in sx. Pt followed up with Dr Conway last week due to recurring sx of thrush and now has chest pain and so was recommended to go ed for fungal iv abx therapy and rule out lung infection. Pt denies associated fevers, chills, dysphagia, odynophagia, sob, melena, or hematochezia. Pt reports chest pain as constant, 4/10, sternal area, no aggravating or alleviating factors.    #candida esophagitis, thrush  -throat cx  -iv antifungal therapy  -f/u fungal bld cx  -f/u bld cx x2  -ID consult  -GI consult    #Chest Pain due to Acute Blood Loss Anemia, r/o cardiac etiology  #Acute Blood Loss Anemia, r/o GIB, hx of PUD or possibly from esophagitis   #Vaginal Bleed  -trend hgb  -transfuse 2u prbc  -IV PPI bid  - will get 2D echo, CTA Heart/Coronary Artery  - Will get TVUS and Pelvic US  -GI consult  -IVF  -f/u anemia workup sent in ED    #anxiety disorder  -c/w clonazepam and buspar    #Supportive Management:  Dispo:   DVT Ppx: Heparin sq  GI Ppx: pantoprazole    Tablet 40 milliGRAM(s) Oral before breakfast  Diet:  Diet, Regular (07-05-23 @ 11:02) [Active]      Total time spent to complete patient's bedside assessment, review medical chart, discuss medical plan of care with covering medical team was more than 45 minutes with >50% of time spent face to face with patient, discussion with patient/family and/or coordination of care    Fabien Bright MD/Pina  Attending Physician

## 2023-07-07 ENCOUNTER — TRANSCRIPTION ENCOUNTER (OUTPATIENT)
Age: 48
End: 2023-07-07

## 2023-07-07 LAB
CULTURE RESULTS: SIGNIFICANT CHANGE UP
HIV 1+2 AB+HIV1 P24 AG SERPL QL IA: SIGNIFICANT CHANGE UP
HIV1 RNA # SERPL NAA+PROBE: SIGNIFICANT CHANGE UP COPIES/ML
HIV1 RNA SER-IMP: SIGNIFICANT CHANGE UP COPIES/ML
HIV1 RNA SERPL NAA+PROBE-LOG#: SIGNIFICANT CHANGE UP LOGCOPIES/ML
SPECIMEN SOURCE: SIGNIFICANT CHANGE UP
T4 FREE SERPL-MCNC: 0.8 NG/DL — LOW (ref 0.9–1.8)
TROPONIN T SERPL-MCNC: <0.01 NG/ML — SIGNIFICANT CHANGE UP
TSH SERPL-MCNC: 1.6 UIU/ML — SIGNIFICANT CHANGE UP (ref 0.27–4.2)

## 2023-07-07 PROCEDURE — 75574 CT ANGIO HRT W/3D IMAGE: CPT | Mod: 26

## 2023-07-07 PROCEDURE — 99232 SBSQ HOSP IP/OBS MODERATE 35: CPT

## 2023-07-07 RX ORDER — FLUCONAZOLE 150 MG/1
1 TABLET ORAL
Refills: 0 | DISCHARGE

## 2023-07-07 RX ORDER — CASPOFUNGIN ACETATE 7 MG/ML
50 INJECTION, POWDER, LYOPHILIZED, FOR SOLUTION INTRAVENOUS
Qty: 0 | Refills: 0 | DISCHARGE
Start: 2023-07-07 | End: 2023-08-01

## 2023-07-07 RX ADMIN — HEPARIN SODIUM 5000 UNIT(S): 5000 INJECTION INTRAVENOUS; SUBCUTANEOUS at 18:15

## 2023-07-07 RX ADMIN — Medication 650 MILLIGRAM(S): at 13:12

## 2023-07-07 RX ADMIN — CASPOFUNGIN ACETATE 260 MILLIGRAM(S): 7 INJECTION, POWDER, LYOPHILIZED, FOR SOLUTION INTRAVENOUS at 11:57

## 2023-07-07 RX ADMIN — Medication 0.25 MILLIGRAM(S): at 15:29

## 2023-07-07 RX ADMIN — Medication 650 MILLIGRAM(S): at 12:51

## 2023-07-07 RX ADMIN — Medication 10 MILLIGRAM(S): at 18:15

## 2023-07-07 NOTE — CONSULT NOTE ADULT - SUBJECTIVE AND OBJECTIVE BOX
INTERVENTIONAL RADIOLOGY CONSULT:     Procedure Requested:     HPI:  Pt is a 47yo F with a pmhx obesity, gastric bypass, PUD, anxiety disorder, candida esophagitis after being on prolonged steroids for hearing loss whom reports being first diagnosed with candida esophagitis by egd 9 months ago, follows with Dr Gomez and has received several prolonged course of diflucan with intermittent improvement in sx. Pt followed up with Dr Conway last week due to recurring sx of thrush and now has chest pain and so was recommended to go ed for fungal iv abx therapy and rule out lung infection. Pt denies associated fevers, chills, dysphagia, odynophagia, sob, melena, or hematochezia. Pt reports chest pain as constant, 4/10, sternal area, no aggravating or alleviating factors..  (05 Jul 2023 11:03)      PAST MEDICAL & SURGICAL HISTORY:  Anxiety      Lower back pain      HLD (hyperlipidemia)      HTN (hypertension)  borderline -no meds      Hypothyroidism      OA (osteoarthritis)      H/O gastric ulcer      Obesity  bmi  47.6      Depression      GRECIA on CPAP      H/O thalassemia minor      Candida esophagitis      History of thrush      H/O tubal ligation      S/P tonsillectomy      History of D&C  HYSTEROSCOPY-AUG 2019      H/O umbilical hernia repair          MEDICATIONS  (STANDING):  busPIRone 10 milliGRAM(s) Oral two times a day  caspofungin IVPB      caspofungin IVPB 50 milliGRAM(s) IV Intermittent every 24 hours  chlorhexidine 4% Liquid 1 Application(s) Topical <User Schedule>  heparin   Injectable 5000 Unit(s) SubCutaneous every 12 hours  pantoprazole    Tablet 40 milliGRAM(s) Oral before breakfast    MEDICATIONS  (PRN):  acetaminophen     Tablet .. 650 milliGRAM(s) Oral every 6 hours PRN Temp greater or equal to 38C (100.4F), Mild Pain (1 - 3)  aluminum hydroxide/magnesium hydroxide/simethicone Suspension 30 milliLiter(s) Oral every 4 hours PRN Dyspepsia  clonazePAM  Tablet 0.25 milliGRAM(s) Oral daily PRN for anxiety  melatonin 3 milliGRAM(s) Oral at bedtime PRN Insomnia  ondansetron Injectable 4 milliGRAM(s) IV Push every 8 hours PRN Nausea and/or Vomiting      Allergies    No Known Allergies    Intolerances          FAMILY HISTORY:  Family history of CVA  GRANDMOTHER        Physical Exam:   Vital Signs Last 24 Hrs  T(C): 36.2 (07 Jul 2023 04:51), Max: 36.2 (07 Jul 2023 04:51)  T(F): 97.2 (07 Jul 2023 04:51), Max: 97.2 (07 Jul 2023 04:51)  HR: 68 (07 Jul 2023 04:51) (59 - 68)  BP: 115/53 (07 Jul 2023 04:51) (110/56 - 115/53)  BP(mean): --  RR: 18 (07 Jul 2023 04:51) (18 - 18)  SpO2: 99% (07 Jul 2023 04:51) (99% - 100%)          Labs:                         7.3    4.12  )-----------( 217      ( 06 Jul 2023 07:19 )             26.8     07-06    144  |  107  |  10  ----------------------------<  82  4.1   |  26  |  0.5<L>    Ca    8.2<L>      06 Jul 2023 07:19          Pertinent labs:                      7.3    4.12  )-----------( 217      ( 06 Jul 2023 07:19 )             26.8       07-06    144  |  107  |  10  ----------------------------<  82  4.1   |  26  |  0.5<L>    Ca    8.2<L>      06 Jul 2023 07:19            Radiology & Additional Studies:     Radiology imaging reviewed.       ASSESSMENT AND PLAN:    48 year old female with PMH of lower back pain, HLD, HTN, hypothyroidism, obesity, gastric bypass, PUD is currently admitted for acute anemia and recurrence of esophageal candidiasis. IR consulted to place PICC for antifungal therapy. Patient currently has a midline, which she may be discharged with and have replaced with a PICC line next week if she is clinically stable for discharge. Spoke with primary team, who are in agreement with this plan.       Plan:   -Schedule patient as an outpatient next week for PICC placement       Thank you for the courtesy of this consult, please call [phone] with any further questions.

## 2023-07-07 NOTE — PROGRESS NOTE ADULT - SUBJECTIVE AND OBJECTIVE BOX
KEBEDE, MAHI  48y, Female  Allergy: No Known Allergies      LOS  2d    CHIEF COMPLAINT: Anemia/esophageal candidiasis (06 Jul 2023 07:49)      INTERVAL EVENTS/HPI  - No acute events overnight  - T(F): , Max: 97.2 (07-07-23 @ 04:51)  - Denies any worsening symptoms  - Tolerating medication  - WBC Count: 4.12 (07-06-23 @ 07:19)  WBC Count: 5.59 (07-05-23 @ 21:31)     - Creatinine: 0.5 (07-06-23 @ 07:19)       ROS  General: Denies rigors, nightsweats  HEENT: Denies headache, rhinorrhea, sore throat, eye pain  CV: Denies CP, palpitations  PULM: Denies wheezing, hemoptysis  GI: Denies hematemesis, hematochezia, melena  : Denies discharge, hematuria  MSK: Denies arthralgias, myalgias  SKIN: Denies rash, lesions  NEURO: Denies paresthesias, weakness  PSYCH: Denies depression, anxiety    VITALS:  T(F): 97.2, Max: 97.2 (07-07-23 @ 04:51)  HR: 68  BP: 115/53  RR: 18Vital Signs Last 24 Hrs  T(C): 36.2 (07 Jul 2023 04:51), Max: 36.2 (07 Jul 2023 04:51)  T(F): 97.2 (07 Jul 2023 04:51), Max: 97.2 (07 Jul 2023 04:51)  HR: 68 (07 Jul 2023 04:51) (50 - 68)  BP: 115/53 (07 Jul 2023 04:51) (98/52 - 115/53)  BP(mean): --  RR: 18 (07 Jul 2023 04:51) (18 - 18)  SpO2: 99% (07 Jul 2023 04:51) (99% - 100%)    Parameters below as of 06 Jul 2023 14:42  Patient On (Oxygen Delivery Method): room air        PHYSICAL EXAM:  Gen: NAD, resting in bed  HEENT: Normocephalic, atraumatic  Neck: supple, no lymphadenopathy  CV: Regular rate & regular rhythm  Lungs: decreased BS at bases, no fremitus  Abdomen: Soft, BS present  Ext: Warm, well perfused  Neuro: non focal, awake  Skin: no rash, no erythema  Lines: no phlebitis    FH: Non-contributory  Social Hx: Non-contributory    TESTS & MEASUREMENTS:                        7.3    4.12  )-----------( 217      ( 06 Jul 2023 07:19 )             26.8     07-06    144  |  107  |  10  ----------------------------<  82  4.1   |  26  |  0.5<L>    Ca    8.2<L>      06 Jul 2023 07:19          Urinalysis Basic - ( 06 Jul 2023 07:19 )    Color: x / Appearance: x / SG: x / pH: x  Gluc: 82 mg/dL / Ketone: x  / Bili: x / Urobili: x   Blood: x / Protein: x / Nitrite: x   Leuk Esterase: x / RBC: x / WBC x   Sq Epi: x / Non Sq Epi: x / Bacteria: x        Culture - Fungal, Other (collected 07-05-23 @ 12:00)  Source: .Other Other, throat  Preliminary Report (07-06-23 @ 13:24):    Testing in progress    Culture - Blood (collected 07-05-23 @ 08:20)  Source: .Blood Blood-Peripheral  Preliminary Report (07-06-23 @ 18:01):    No growth at 24 hours    Culture - Blood (collected 07-05-23 @ 08:20)  Source: .Blood Blood-Peripheral  Preliminary Report (07-06-23 @ 18:01):    No growth at 24 hours    Culture - Fungal, Blood (collected 07-05-23 @ 08:20)  Source: .Blood Blood-Peripheral  Preliminary Report (07-06-23 @ 12:04):    Testing in progress        INFECTIOUS DISEASES TESTING    RADIOLOGY & ADDITIONAL TESTS:  I have personally reviewed the last available Chest xray  CXR  Xray Chest 2 Views PA/Lat:   ACC: [id number] EXAM:  XR CHEST PA LAT 2V   ORDERED BY: ROMAN REYES     PROCEDURE DATE:  07/05/2023          INTERPRETATION:  Clinical History / Reason for exam: Chest pain    Comparison : Chest radiograph 12/8/2020.    Technique/Positioning: PA and lateral chest radiograph.    Findings:    Support devices: None.    Cardiac/mediastinum/hilum: Unremarkable.    Lung parenchyma/Pleura: No focal consolidation, effusion or pneumothorax.    Skeleton/soft tissues: Unremarkable.    Impression:    No radiographic evidence of acute cardiopulmonary disease.        --- End of Report ---            NARDA RIVAS MD; Attending Radiologist  This document has been electronically signed. Jul 5 2023  8:33AM (07-05-23 @ 07:57)      CT        MEDICATIONS  busPIRone 10 Oral two times a day  caspofungin IVPB     caspofungin IVPB 50 IV Intermittent every 24 hours  chlorhexidine 4% Liquid 1 Topical <User Schedule>  heparin   Injectable 5000 SubCutaneous every 12 hours  pantoprazole    Tablet 40 Oral before breakfast      WEIGHT  Weight (kg): 74.3 (07-05-23 @ 12:52)      ANTIBIOTICS:  caspofungin IVPB      caspofungin IVPB 50 milliGRAM(s) IV Intermittent every 24 hours      All available historical records have been reviewed       KEBEDE, MAHI  48y, Female  Allergy: No Known Allergies      LOS  2d    CHIEF COMPLAINT: Anemia/esophageal candidiasis (06 Jul 2023 07:49)      INTERVAL EVENTS/HPI  - No acute events overnight  - T(F): , Max: 97.2 (07-07-23 @ 04:51)  - Denies any worsening symptoms  - Tolerating medication. Midline placed.   - WBC Count: 4.12 (07-06-23 @ 07:19)  WBC Count: 5.59 (07-05-23 @ 21:31)     - Creatinine: 0.5 (07-06-23 @ 07:19)       ROS  General: Denies rigors, nightsweats  HEENT: Denies headache, rhinorrhea, sore throat, eye pain  CV: Denies CP, palpitations  PULM: Denies wheezing, hemoptysis  GI: Denies hematemesis, hematochezia, melena  : Denies discharge, hematuria  MSK: Denies arthralgias, myalgias  SKIN: Denies rash, lesions  NEURO: Denies paresthesias, weakness  PSYCH: Denies depression, anxiety    VITALS:  T(F): 97.2, Max: 97.2 (07-07-23 @ 04:51)  HR: 68  BP: 115/53  RR: 18Vital Signs Last 24 Hrs  T(C): 36.2 (07 Jul 2023 04:51), Max: 36.2 (07 Jul 2023 04:51)  T(F): 97.2 (07 Jul 2023 04:51), Max: 97.2 (07 Jul 2023 04:51)  HR: 68 (07 Jul 2023 04:51) (50 - 68)  BP: 115/53 (07 Jul 2023 04:51) (98/52 - 115/53)  BP(mean): --  RR: 18 (07 Jul 2023 04:51) (18 - 18)  SpO2: 99% (07 Jul 2023 04:51) (99% - 100%)    Parameters below as of 06 Jul 2023 14:42  Patient On (Oxygen Delivery Method): room air        PHYSICAL EXAM:  Gen: NAD, resting in bed  HEENT: Normocephalic, atraumatic  Neck: supple, no lymphadenopathy  CV: Regular rate & regular rhythm  Lungs: Clear to auscultate bilaterally  Abdomen: Soft, BS present  Ext: Warm, well perfused  Neuro: non focal, awake  Skin: no rash, no erythema  Lines: no phlebitis    FH: Non-contributory  Social Hx: Non-contributory    TESTS & MEASUREMENTS:                        7.3    4.12  )-----------( 217      ( 06 Jul 2023 07:19 )             26.8     07-06    144  |  107  |  10  ----------------------------<  82  4.1   |  26  |  0.5<L>    Ca    8.2<L>      06 Jul 2023 07:19          Urinalysis Basic - ( 06 Jul 2023 07:19 )    Color: x / Appearance: x / SG: x / pH: x  Gluc: 82 mg/dL / Ketone: x  / Bili: x / Urobili: x   Blood: x / Protein: x / Nitrite: x   Leuk Esterase: x / RBC: x / WBC x   Sq Epi: x / Non Sq Epi: x / Bacteria: x        Culture - Fungal, Other (collected 07-05-23 @ 12:00)  Source: .Other Other, throat  Preliminary Report (07-06-23 @ 13:24):    Testing in progress    Culture - Blood (collected 07-05-23 @ 08:20)  Source: .Blood Blood-Peripheral  Preliminary Report (07-06-23 @ 18:01):    No growth at 24 hours    Culture - Blood (collected 07-05-23 @ 08:20)  Source: .Blood Blood-Peripheral  Preliminary Report (07-06-23 @ 18:01):    No growth at 24 hours    Culture - Fungal, Blood (collected 07-05-23 @ 08:20)  Source: .Blood Blood-Peripheral  Preliminary Report (07-06-23 @ 12:04):    Testing in progress    RADIOLOGY & ADDITIONAL TESTS:  I have personally reviewed the last available Chest xray  CXR  Xray Chest 2 Views PA/Lat:   ACC: [id number] EXAM:  XR CHEST PA LAT 2V   ORDERED BY: ROMAN REYES     PROCEDURE DATE:  07/05/2023          INTERPRETATION:  Clinical History / Reason for exam: Chest pain    Comparison : Chest radiograph 12/8/2020.    Technique/Positioning: PA and lateral chest radiograph.    Findings:    Support devices: None.    Cardiac/mediastinum/hilum: Unremarkable.    Lung parenchyma/Pleura: No focal consolidation, effusion or pneumothorax.    Skeleton/soft tissues: Unremarkable.    Impression:    No radiographic evidence of acute cardiopulmonary disease.        --- End of Report ---            NARDA RIVAS MD; Attending Radiologist  This document has been electronically signed. Jul 5 2023  8:33AM (07-05-23 @ 07:57)      CT        MEDICATIONS  busPIRone 10 Oral two times a day  caspofungin IVPB     caspofungin IVPB 50 IV Intermittent every 24 hours  chlorhexidine 4% Liquid 1 Topical <User Schedule>  heparin   Injectable 5000 SubCutaneous every 12 hours  pantoprazole    Tablet 40 Oral before breakfast      WEIGHT  Weight (kg): 74.3 (07-05-23 @ 12:52)      ANTIBIOTICS:  caspofungin IVPB      caspofungin IVPB 50 milliGRAM(s) IV Intermittent every 24 hours      All available historical records have been reviewed

## 2023-07-07 NOTE — DISCHARGE NOTE PROVIDER - PROVIDER TOKENS
PROVIDER:[TOKEN:[[id number]],FOLLOWUP:[1 week]],PROVIDER:[TOKEN:[[id number]],FOLLOWUP:[2 weeks]],PROVIDER:[TOKEN:[[id number]],FOLLOWUP:[1-3 days]]

## 2023-07-07 NOTE — CHART NOTE - NSCHARTNOTEFT_GEN_A_CORE
Spoke with IR team for PICC Line placement.   Won't be able to do it till Monday, need 24 hours notice and don't do PICC lines on weekends.   PICC line placement on Monday and than discharge on antifungals.

## 2023-07-07 NOTE — CHART NOTE - NSCHARTNOTEFT_GEN_A_CORE
Trop peaked to 0.03 in setting of acute blood loss anemia s/p 3 units pRBCs.  stable to dc on IV antifungal with mid-line per ID recs  2D echo benign, pending CTA Heart/Coronary artery. If imaging is benign, patient to be discharged home today with close follow up with ID Trop peaked to 0.03 in setting of acute blood loss anemia s/p 3 units pRBCs.  stable to dc on IV antifungal with mid-line per ID recs  2D echo benign, pending CTA Heart/Coronary artery. If imaging is benign, patient to be discharged home today with close follow up with ID and GI in the outpatient.

## 2023-07-07 NOTE — DISCHARGE NOTE PROVIDER - NSDCMRMEDTOKEN_GEN_ALL_CORE_FT
BuSpar 10 mg oral tablet: 1 orally 2 times a day  caspofungin: 50 milligram(s) intravenous once a day  clonazePAM 0.5 mg oral tablet: 0.5 tab(s) orally once a day as needed for  anxiety  OMEPRAZOLE  40 MG CPDR:

## 2023-07-07 NOTE — DISCHARGE NOTE PROVIDER - HOSPITAL COURSE
Patient is a 47yo F with a pmhx obesity, gastric bypass, PUD, anxiety disorder, candida esophagitis after being on prolonged steroids for hearing loss whom reports being first diagnosed with candida esophagitis by egd 9 months ago, follows with Dr Gomez and has received several prolonged course of diflucan with intermittent improvement in sx. Pt followed up with Dr Conway last week due to recurring sx of thrush and now has chest pain and so was recommended to go ed for fungal iv abx therapy and rule out lung infection. Pt denies associated fevers, chills, dysphagia, odynophagia, sob, melena, or hematochezia. Pt reports chest pain as constant, 4/10, sternal area, no aggravating or alleviating factors.    #candida esophagitis, thrush  -throat cx  - ID consulted appreciated - started on IV caspofungin IV 50 mg  - Mid line placed, will dc on caspofungin IV 50mg QD till Aug 1st  -Weekly CBC,BMP,ESR/CRP  - [facility] [employer].   [address]  [phone]  Fax [phone]   -f/u fungal bld cx - prelim no growth this far   - HIV screen negative   - GI consult appreciated, outpatient colonoscopy recommended and outpatient FU w/ hematology as hx of thalassemia     #Chest Pain due to Acute Blood Loss Anemia, r/o cardiac etiology  #Acute Blood Loss Anemia, r/o GIB, hx of PUD or possibly from esophagitis   #Vaginal Bleed  -trend hgb  -transfuse 2u prbc  -IV PPI bid  - 2D echo benign   - Pending CTA Heart/Coronary Artery  - TVUS and Pelvic US - no acute abnormality   -GI consult appreciated outpatient colonoscopy recommended and outpatient FU w/ hematology as hx of thalassemia  -IVF    #anxiety disorder  -c/w clonazepam and buspar    Notified by attending that patient is medically stable to be discharged.  Patient will f/u with PCP. FU w/ GI for colonoscopy and hematology recommended. Patient in agreement.  VSS  Will discharge patient. Patient is a 49yo F with a pmhx obesity, gastric bypass, PUD, anxiety disorder, candida esophagitis after being on prolonged steroids for hearing loss whom reports being first diagnosed with candida esophagitis by egd 9 months ago, follows with Dr Gomez and has received several prolonged course of diflucan with intermittent improvement in sx. Pt followed up with Dr Conway last week due to recurring sx of thrush and now has chest pain and so was recommended to go ed for fungal iv abx therapy and rule out lung infection. Pt denies associated fevers, chills, dysphagia, odynophagia, sob, melena, or hematochezia. Pt reports chest pain as constant, 4/10, sternal area, no aggravating or alleviating factors.     #candida esophagitis, thrush  -throat cx  - ID consulted appreciated - started on IV caspofungin IV 50 mg  - Mid line placed, will dc on caspofungin IV 50mg QD till Aug 1st  -Weekly CBC,BMP,ESR/CRP  - [facility] [employer].   [address]  [phone]  Fax [phone]   -f/u fungal bld cx - prelim no growth this far   - HIV screen negative   - GI consult appreciated, outpatient colonoscopy recommended and outpatient FU w/ hematology as hx of thalassemia     #Chest Pain due to Acute Blood Loss Anemia, r/o cardiac etiology  #Acute Blood Loss Anemia, r/o GIB, hx of PUD or possibly from esophagitis   #Vaginal Bleed  -trend hgb  -transfuse 2u prbc  -IV PPI bid  - 2D echo benign   - Pending CTA Heart/Coronary Artery  - TVUS and Pelvic US - no acute abnormality   -GI consult appreciated outpatient colonoscopy recommended and outpatient FU w/ hematology as hx of thalassemia  -IVF    #anxiety disorder  -c/w clonazepam and buspar    Notified by attending that patient is medically stable to be discharged.  Patient will f/u with PCP. FU w/ GI for colonoscopy and hematology recommended. Patient in agreement.  VSS  Will discharge patient. Patient is a 49yo F with a pmhx obesity, gastric bypass, PUD, anxiety disorder, candida esophagitis after being on prolonged steroids for hearing loss whom reports being first diagnosed with candida esophagitis by egd 9 months ago, follows with Dr Gomez and has received several prolonged course of diflucan with intermittent improvement in sx. Pt followed up with Dr Conway last week due to recurring sx of thrush and now has chest pain and so was recommended to go ed for fungal iv abx therapy and rule out lung infection. Pt denies associated fevers, chills, dysphagia, odynophagia, sob, melena, or hematochezia. Pt reports chest pain as constant, 4/10, sternal area, no aggravating or alleviating factors.     #candida esophagitis, thrush  -throat cx  - ID consulted appreciated - started on IV caspofungin IV 50 mg  - Mid line placed, will dc on caspofungin IV 50mg QD till Aug 1st  -Weekly CBC,BMP,ESR/CRP  - [facility] [employer].   [address]  [phone]  Fax [phone]   -f/u fungal bld cx - prelim no growth this far   - HIV screen negative   - GI consult appreciated, outpatient colonoscopy recommended and outpatient FU w/ hematology as hx of thalassemia     #Chest Pain due to Acute Blood Loss Anemia, r/o cardiac etiology  #Acute Blood Loss Anemia, r/o GIB, hx of PUD or possibly from esophagitis   #Vaginal Bleed  -hgb stable at 8.3  -transfused 3u prbc  -s/p IV PPI bid  - 2D echo benign   - CTA Heart/Coronary Artery no acute pathology found  - TVUS and Pelvic US - no acute abnormality   -GI consult appreciated outpatient colonoscopy recommended and outpatient FU w/ hematology as hx of thalassemia  - f/u with private GYN    #anxiety disorder  -c/w clonazepam and buspar    Notified by attending that patient is medically stable to be discharged.  Patient will f/u with PCP. FU w/ GI for colonoscopy and hematology recommended. Patient in agreement.  VSS  Will discharge patient.

## 2023-07-07 NOTE — DISCHARGE NOTE PROVIDER - CARE PROVIDER_API CALL
Mark Ryan  Internal Medicine  [address] [place] [address]  Phone: [phone]  Fax: [phone]  Follow Up Time: 1 week    Jarod Ramires  Gastroenterology  [address] [place] [address]  Phone: [phone]  Fax: [phone]  Follow Up Time: 2 weeks    Gregorio Storey  Infectious Disease  [address] [place] [address]  Phone: [phone]  Fax: [phone]  Follow Up Time: 1-3 days

## 2023-07-07 NOTE — DISCHARGE NOTE PROVIDER - NSDCCPCAREPLAN_GEN_ALL_CORE_FT
PRINCIPAL DISCHARGE DIAGNOSIS  Diagnosis: Candidiasis  Assessment and Plan of Treatment: - You were seen and treated by the medical team for candidiasis esophagitis.   - HIV screen negative   - Infectious disease team was consulted, were treated w/ IV antifungal medications. Midline was placed. Will discharge on caspofungin IV 50mg QD till Aug 1st. Weekly blood work including CBC,BMP,ESR/CRP recommended. Follow up w/ Dr. Gregorio lu for telehealth.   - Follow up with Primary care proved and infectious disease team.      SECONDARY DISCHARGE DIAGNOSES  Diagnosis: Anemia  Assessment and Plan of Treatment: - You also complained of chest pain. Troponin x3 negative. 2D ECHO benign. Likely due to acute blood loss anemia.  - Recieved 2units of packed red blood cells  - GI Consult appreciated. Outpatient colonoscopy recommended and outpatient FU w/ hematology as hx of thalassemia  - You also complained of Vaginal Bleed. TVUS and Pelvic US - no acute abnormality. Follow up with OBGYN recommended

## 2023-07-07 NOTE — PROGRESS NOTE ADULT - ASSESSMENT
48 year old female with PMH of lower back pain, HLD, HTN, hypothyroidism, obesity, gastric bypass, PUD is currently admitted for acute anemia and recurrence of esophageal candidiasis.     IMPRESSION  # Esophageal candidiasis Recurrent.   # Acute anemia, Iron deficient.   Creatinine: 0.5 (07-05-23 @ 07:55)    Height (cm): 172.7 (07-05-23 @ 12:52)  Weight (kg): 74.3 (07-05-23 @ 12:52)    RECOMMENDATIONS  -Recommend following up with the throat swab cultures.   -Follow up with the HIV screen.   -Recommend following up with the GI team, if she needs an EGD to rule out other etiologies for acute anemia such as inflammatory esophagitis. Can consider repeating H.pylori studies. May need biopsies for fungal cultures and susceptibilities.   -For now continue with caspofungin IV 50 mg (Started on 7/5)     If any questions, please send a message or call on [employer] Teams  Please continue to update ID with any pertinent new laboratory or radiographic findings.       48 year old female with PMH of lower back pain, HLD, HTN, hypothyroidism, obesity, gastric bypass, PUD is currently admitted for acute anemia and recurrence of esophageal candidiasis.     IMPRESSION  # Esophageal candidiasis Recurrent.   # Acute anemia, Iron deficient.   Creatinine: 0.5 (07-05-23 @ 07:55)    Height (cm): 172.7 (07-05-23 @ 12:52)  Weight (kg): 74.3 (07-05-23 @ 12:52)    RECOMMENDATIONS  -Recommend following up with the throat swab cultures.   -Follow up with the HIV screen.   -Recommend following up with the GI team, if she needs an EGD to rule out other etiologies for acute anemia such as inflammatory esophagitis. May need biopsies for fungal cultures and susceptibilities. Can be done outpatient.  -For now continue with caspofungin IV 50 mg (Started on 7/5)  -Plan for total of 28 days of IV Caspofungin via midline.   -Post completion may need to consider suppression if recurrence of the episode. (Can consider posaconazole)  -Weekly CBC,BMP,ESR/CRP  -ID follow up with Dr.Jordan Raleigh Mauro for [employer].    [address] [facility]  [phone]  Fax [phone]    If any questions, please send a message or call on [employer] Teams  Please continue to update ID with any pertinent new laboratory or radiographic findings.       48 year old female with PMH of lower back pain, HLD, HTN, hypothyroidism, obesity, gastric bypass, PUD is currently admitted for acute anemia and recurrence of esophageal candidiasis.     IMPRESSION  # Esophageal candidiasis Recurrent.   # Acute anemia, Iron deficient.   Creatinine: 0.5 (07-05-23 @ 07:55)    Height (cm): 172.7 (07-05-23 @ 12:52)  Weight (kg): 74.3 (07-05-23 @ 12:52)    RECOMMENDATIONS  -Recommend following up with the throat swab cultures.   -Recommend following up with the GI team, if she needs a repeat EGD to rule out other etiologies for acute anemia such as inflammatory esophagitis. May need biopsies for fungal cultures and susceptibilities. Can be done outpatient.  -For now continue with caspofungin IV 50 mg (Started on 7/5)  -Plan for total of 28 days of IV Caspofungin via midline.   -Post completion may need to consider suppression if recurrence of the episode. (Can consider posaconazole)  -Weekly CBC,BMP,ESR/CRP  -[facility] [address].    [address] [facility]  [phone]  Fax [phone]    If any questions, please send a message or call on [employer] Teams  Please continue to update ID with any pertinent new laboratory or radiographic findings.

## 2023-07-08 LAB
ALBUMIN SERPL ELPH-MCNC: 4 G/DL — SIGNIFICANT CHANGE UP (ref 3.5–5.2)
ALP SERPL-CCNC: 42 U/L — SIGNIFICANT CHANGE UP (ref 30–115)
ALT FLD-CCNC: 11 U/L — SIGNIFICANT CHANGE UP (ref 0–41)
ANION GAP SERPL CALC-SCNC: 7 MMOL/L — SIGNIFICANT CHANGE UP (ref 7–14)
AST SERPL-CCNC: 16 U/L — SIGNIFICANT CHANGE UP (ref 0–41)
BASOPHILS # BLD AUTO: 0.05 K/UL — SIGNIFICANT CHANGE UP (ref 0–0.2)
BASOPHILS NFR BLD AUTO: 1.3 % — HIGH (ref 0–1)
BILIRUB SERPL-MCNC: 0.3 MG/DL — SIGNIFICANT CHANGE UP (ref 0.2–1.2)
BUN SERPL-MCNC: 9 MG/DL — LOW (ref 10–20)
CALCIUM SERPL-MCNC: 8.7 MG/DL — SIGNIFICANT CHANGE UP (ref 8.4–10.5)
CHLORIDE SERPL-SCNC: 104 MMOL/L — SIGNIFICANT CHANGE UP (ref 98–110)
CO2 SERPL-SCNC: 29 MMOL/L — SIGNIFICANT CHANGE UP (ref 17–32)
CREAT SERPL-MCNC: 0.6 MG/DL — LOW (ref 0.7–1.5)
EGFR: 111 ML/MIN/1.73M2 — SIGNIFICANT CHANGE UP
EOSINOPHIL # BLD AUTO: 0.22 K/UL — SIGNIFICANT CHANGE UP (ref 0–0.7)
EOSINOPHIL NFR BLD AUTO: 5.8 % — SIGNIFICANT CHANGE UP (ref 0–8)
GLUCOSE SERPL-MCNC: 73 MG/DL — SIGNIFICANT CHANGE UP (ref 70–99)
HCT VFR BLD CALC: 31.4 % — LOW (ref 37–47)
HGB BLD-MCNC: 8.5 G/DL — LOW (ref 12–16)
IMM GRANULOCYTES NFR BLD AUTO: 0.3 % — SIGNIFICANT CHANGE UP (ref 0.1–0.3)
LYMPHOCYTES # BLD AUTO: 1.51 K/UL — SIGNIFICANT CHANGE UP (ref 1.2–3.4)
LYMPHOCYTES # BLD AUTO: 39.8 % — SIGNIFICANT CHANGE UP (ref 20.5–51.1)
MAGNESIUM SERPL-MCNC: 2.1 MG/DL — SIGNIFICANT CHANGE UP (ref 1.8–2.4)
MCHC RBC-ENTMCNC: 15.7 PG — LOW (ref 27–31)
MCHC RBC-ENTMCNC: 27.1 G/DL — LOW (ref 32–37)
MCV RBC AUTO: 58 FL — LOW (ref 81–99)
MONOCYTES # BLD AUTO: 0.29 K/UL — SIGNIFICANT CHANGE UP (ref 0.1–0.6)
MONOCYTES NFR BLD AUTO: 7.7 % — SIGNIFICANT CHANGE UP (ref 1.7–9.3)
NEUTROPHILS # BLD AUTO: 1.71 K/UL — SIGNIFICANT CHANGE UP (ref 1.4–6.5)
NEUTROPHILS NFR BLD AUTO: 45.1 % — SIGNIFICANT CHANGE UP (ref 42.2–75.2)
NRBC # BLD: 0 /100 WBCS — SIGNIFICANT CHANGE UP (ref 0–0)
PHOSPHATE SERPL-MCNC: 4.2 MG/DL — SIGNIFICANT CHANGE UP (ref 2.1–4.9)
PLATELET # BLD AUTO: 225 K/UL — SIGNIFICANT CHANGE UP (ref 130–400)
PMV BLD: SIGNIFICANT CHANGE UP (ref 7.4–10.4)
POTASSIUM SERPL-MCNC: 4.6 MMOL/L — SIGNIFICANT CHANGE UP (ref 3.5–5)
POTASSIUM SERPL-SCNC: 4.6 MMOL/L — SIGNIFICANT CHANGE UP (ref 3.5–5)
PROT SERPL-MCNC: 5.8 G/DL — LOW (ref 6–8)
RBC # BLD: 5.41 M/UL — HIGH (ref 4.2–5.4)
RBC # FLD: 35.6 % — HIGH (ref 11.5–14.5)
SODIUM SERPL-SCNC: 140 MMOL/L — SIGNIFICANT CHANGE UP (ref 135–146)
TROPONIN T SERPL-MCNC: <0.01 NG/ML — SIGNIFICANT CHANGE UP
WBC # BLD: 3.79 K/UL — LOW (ref 4.8–10.8)
WBC # FLD AUTO: 3.79 K/UL — LOW (ref 4.8–10.8)

## 2023-07-08 PROCEDURE — 99232 SBSQ HOSP IP/OBS MODERATE 35: CPT

## 2023-07-08 PROCEDURE — 99222 1ST HOSP IP/OBS MODERATE 55: CPT

## 2023-07-08 RX ADMIN — Medication 3 MILLIGRAM(S): at 21:34

## 2023-07-08 RX ADMIN — PANTOPRAZOLE SODIUM 40 MILLIGRAM(S): 20 TABLET, DELAYED RELEASE ORAL at 05:32

## 2023-07-08 RX ADMIN — Medication 0.25 MILLIGRAM(S): at 21:34

## 2023-07-08 RX ADMIN — HEPARIN SODIUM 5000 UNIT(S): 5000 INJECTION INTRAVENOUS; SUBCUTANEOUS at 17:58

## 2023-07-08 RX ADMIN — CASPOFUNGIN ACETATE 260 MILLIGRAM(S): 7 INJECTION, POWDER, LYOPHILIZED, FOR SOLUTION INTRAVENOUS at 10:51

## 2023-07-08 RX ADMIN — Medication 10 MILLIGRAM(S): at 17:58

## 2023-07-08 RX ADMIN — Medication 10 MILLIGRAM(S): at 05:32

## 2023-07-08 NOTE — CONSULT NOTE ADULT - NS ATTEND AMEND GEN_ALL_CORE FT
Patient with no cardiac history. She has esophagitis as above. She is anemic. Chest pain for weeks. CTA reviewed. Calcium scor 0. No significant disease. But not all arteries seen well. But with calcium score 0. Doubt significant disease. Need rx anemia. Can consider outpatient stress echo when anemia improves.
I edited the note.

## 2023-07-08 NOTE — CONSULT NOTE ADULT - ASSESSMENT
Pt is a 47yo F with a pmhx obesity, gastric bypass, PUD, and anxiety disorder, was admitted for  candida esophagitis. Cardiology consulted for Pt c/o chest discomfort/pain x1 week      Impression:  #Chest pain: ACS ruled out  #Acute Anemia    Pt is a 49yo F with a pmhx obesity, gastric bypass, PUD, and anxiety disorder, was admitted for  candida esophagitis. Cardiology consulted for Pt c/o chest discomfort/pain x1 week      Impression:  #Chest pain: ACS ruled out  #Acute Anemia       CP symptoms appear atypical  ECG: NS, non ischemic  Trop initially mildly elevated 0.03, then flat x2. Likely demand 2/2 to acute anemia  Recent inpatient CCTA 7/5/23: Nondiagnostic, but calcium score of 0      Plan:  Consider outpatient stress echo when stable  Cont w/home meds  Monitor lytes  Monitor CBC, and transfuse PRN      Discussed with Dr Solitario

## 2023-07-08 NOTE — CONSULT NOTE ADULT - SUBJECTIVE AND OBJECTIVE BOX
HPI:  Pt is a 47yo F with a pmhx obesity, gastric bypass, PUD, anxiety disorder, candida esophagitis after being on prolonged steroids for hearing loss whom reports being first diagnosed with candida esophagitis by egd 9 months ago, follows with Dr Gomez and has received several prolonged course of diflucan with intermittent improvement in sx. Pt followed up with Dr Conway last week due to recurring sx of thrush and now has chest pain and so was recommended to go ed for fungal iv abx therapy and rule out lung infection. Pt denies associated fevers, chills, dysphagia, odynophagia, sob, melena, or hematochezia. Pt reports chest pain as constant, 4/10, sternal area, no aggravating or alleviating factors..  (05 Jul 2023 11:03)        HPI-Cardiology   Pt with the above Hx, evaluated at bedside with Dr Solitario. Currently admitted for candidiasis/esophagitis. Pt endorses "dull pain" midsternally, non radiating, and worse with deep inspiration x1 week. Denies similar episodes in the past. Currently denies any CP, SOB, palpitations, dizziness/lightheadedness or nausea/vomiting. Radiology tests and hospital records, were reviewed, as well as previous notes on this patient.        PAST MEDICAL & SURGICAL HISTORY  Anxiety    Lower back pain    HLD (hyperlipidemia)    HTN (hypertension)  borderline -no meds    Hypothyroidism    OA (osteoarthritis)    H/O gastric ulcer    Obesity  bmi  47.6    Depression    GRECIA on CPAP    H/O thalassemia minor    Candida esophagitis    History of thrush    H/O tubal ligation    S/P tonsillectomy    History of D&C  HYSTEROSCOPY-AUG 2019    H/O umbilical hernia repair        FAMILY HISTORY:  FAMILY HISTORY:  Family history of CVA  GRANDMOTHER        SOCIAL HISTORY:  []smoker-denies  []Alcohol-denies  []Drug-denies      ALLERGIES:  No Known Allergies      MEDICATIONS:  MEDICATIONS  (STANDING):  busPIRone 10 milliGRAM(s) Oral two times a day  caspofungin IVPB      caspofungin IVPB 50 milliGRAM(s) IV Intermittent every 24 hours  chlorhexidine 4% Liquid 1 Application(s) Topical <User Schedule>  heparin   Injectable 5000 Unit(s) SubCutaneous every 12 hours  pantoprazole    Tablet 40 milliGRAM(s) Oral before breakfast    MEDICATIONS  (PRN):  acetaminophen     Tablet .. 650 milliGRAM(s) Oral every 6 hours PRN Temp greater or equal to 38C (100.4F), Mild Pain (1 - 3)  aluminum hydroxide/magnesium hydroxide/simethicone Suspension 30 milliLiter(s) Oral every 4 hours PRN Dyspepsia  clonazePAM  Tablet 0.25 milliGRAM(s) Oral daily PRN for anxiety  melatonin 3 milliGRAM(s) Oral at bedtime PRN Insomnia  ondansetron Injectable 4 milliGRAM(s) IV Push every 8 hours PRN Nausea and/or Vomiting      HOME MEDICATIONS:  Home Medications:  BuSpar 10 mg oral tablet: 1 orally 2 times a day (05 Jul 2023 10:54)  caspofungin: 50 milligram(s) intravenous once a day (07 Jul 2023 11:07)  clonazePAM 0.5 mg oral tablet: 0.5 tab(s) orally once a day as needed for  anxiety (05 Jul 2023 10:54)  OMEPRAZOLE  40 MG CPDR:  (05 Jul 2023 10:52)      VITALS:   T(F): 96.7 (07-08 @ 05:10), Max: 98.8 (07-05 @ 21:05)  HR: 60 (07-08 @ 05:10) (50 - 76)  BP: 108/60 (07-08 @ 05:10) (98/48 - 115/53)  BP(mean): --  RR: 18 (07-08 @ 05:10) (18 - 18)  SpO2: 98% (07-08 @ 05:10) (92% - 100%)          REVIEW OF SYSTEMS:  See HPI      PHYSICAL EXAM:  NEURO: patient is awake , alert and oriented  GEN: Not in acute distress  NECK: no thyroid enlargement, no JVD  LUNGS: Clear to auscultation bilaterally   CARDIOVASCULAR: S1/S2 present, RRR , no murmurs or rubs, no carotid bruits,  + PP bilaterally  ABD: Soft, non-tender, non-distended, +BS  EXT: No FAINA  SKIN: Intact        LABS:  CARDIAC MARKERS ( 07 Jul 2023 08:00 )  x     / <0.01 ng/mL / x     / x     / x      CARDIAC MARKERS ( 06 Jul 2023 21:35 )  x     / <0.01 ng/mL / x     / x     / x      CARDIAC MARKERS ( 06 Jul 2023 15:42 )  x     / 0.03 ng/mL / x     / x     / x              RADIOLOGY:  -CXR:  < from: Xray Chest 2 Views PA/Lat (07.05.23 @ 07:57) >  Impression:    No radiographic evidence of acute cardiopulmonary disease.        --- End of Report ---        < end of copied text >          CCTA:  < from: CT Angio Heart and Coronaries w/ IV Cont (07.07.23 @ 10:33) >  IMPRESSION:  Nondiagnostic examination. The entire heart is not completely imaged with   optimal contrast opacification. The left main coronary artery, LAD origin   and portion of the proximal segment, and LCx origin are not imaged.   Therefore thesecannot be evaluated and significant narrowing cannot be   excluded. The remainder of the coronary arteries is mildly motion   degraded limiting evaluation although grossly patent without definite   evidence significant narrowing    The total Agatstoncoronary artery calcium score equals 0.    < end of copied text >      ECG:  < from: 12 Lead ECG (07.05.23 @ 07:39) >  Normal sinus rhythm  Cannot rule out Anterior infarct , age undetermined  Abnormal ECG    Confirmed by LIS ZAMBRANO, COMPA ([id number] on 7/5/2023 11:53:49 AM    < end of copied text >

## 2023-07-08 NOTE — PROGRESS NOTE ADULT - SUBJECTIVE AND OBJECTIVE BOX
MEDICINE ATTENDING PROGRESS NOTE  Pt is a 49yo F with a pmhx obesity, gastric bypass, PUD, anxiety disorder, candida esophagitis after being on prolonged steroids for hearing loss whom reports being first diagnosed with candida esophagitis by egd 9 months ago, follows with Dr Gomez and has received several prolonged course of diflucan with intermittent improvement in sx. Pt followed up with Dr Conway last week due to recurring sx of thrush and now has chest pain and so was recommended to go ed for fungal iv abx therapy and rule out lung infection. Pt denies associated fevers, chills, dysphagia, odynophagia, sob, melena, or hematochezia. Pt reports chest pain as constant, 4/10, sternal area, no aggravating or alleviating factors..  (05 Jul 2023 11:03)    Interval/Overnight Events      ROS  General: Denies fevers, chills, nightsweats, weight loss  HEENT: Denies headache, rhinorrhea, sore throat, eye pain  CV: Denies CP, palpitations  PULM: Denies SOB, cough  GI: Denies abdominal pain, diarrhea  : Denies dysuria, hematuria  MSK: Denies arthralgias  SKIN: Denies rash   NEURO: Denies paresthesias, weakness  PSYCH: Denies depression    MEDICATIONS  (STANDING):  busPIRone 10 milliGRAM(s) Oral two times a day  caspofungin IVPB      caspofungin IVPB 50 milliGRAM(s) IV Intermittent every 24 hours  chlorhexidine 4% Liquid 1 Application(s) Topical <User Schedule>  pantoprazole    Tablet 40 milliGRAM(s) Oral before breakfast    MEDICATIONS  (PRN):  acetaminophen     Tablet .. 650 milliGRAM(s) Oral every 6 hours PRN Temp greater or equal to 38C (100.4F), Mild Pain (1 - 3)  aluminum hydroxide/magnesium hydroxide/simethicone Suspension 30 milliLiter(s) Oral every 4 hours PRN Dyspepsia  clonazePAM  Tablet 0.25 milliGRAM(s) Oral daily PRN for anxiety  melatonin 3 milliGRAM(s) Oral at bedtime PRN Insomnia  ondansetron Injectable 4 milliGRAM(s) IV Push every 8 hours PRN Nausea and/or Vomiting    ANTIBIOTICS:  caspofungin IVPB      caspofungin IVPB 50 milliGRAM(s) IV Intermittent every 24 hours      VITALS:  Vital Signs Last 24 Hrs  T(C): 35.9 (08 Jul 2023 05:10), Max: 35.9 (08 Jul 2023 05:10)  T(F): 96.7 (08 Jul 2023 05:10), Max: 96.7 (08 Jul 2023 05:10)  HR: 60 (08 Jul 2023 05:10) (60 - 64)  BP: 108/60 (08 Jul 2023 05:10) (102/49 - 108/60)  BP(mean): --  RR: 18 (08 Jul 2023 05:10) (18 - 18)  SpO2: 98% (08 Jul 2023 05:10) (98% - 99%)    Parameters below as of 08 Jul 2023 05:10  Patient On (Oxygen Delivery Method): room air    PHYSICAL EXAM:  Gen: NAD, resting in bed  HEENT: Normocephalic, atraumatic  Neck: supple, no lymphadenopathy  CV: Regular rate & regular rhythm  Lungs: CTABL no wheeze  Abdomen: Soft, NTND+ BS present  Ext: Warm, well perfused no CCE  Neuro: non focal, awake, CN II-XII intact   Skin: no rash, no erythema  Psych: no SI, HI, Hallucination     LABS/MICROBIOLOGY:                CARDIAC MARKERS ( 07 Jul 2023 08:00 )  x     / <0.01 ng/mL / x     / x     / x      CARDIAC MARKERS ( 06 Jul 2023 21:35 )  x     / <0.01 ng/mL / x     / x     / x      CARDIAC MARKERS ( 06 Jul 2023 15:42 )  x     / 0.03 ng/mL / x     / x     / x          MICROBIOLOGY  Culture - Throat, Special (collected 07-05-23 @ 12:00)  Source: .Throat Throat  Final Report (07-07-23 @ 14:17):    No beta hemolytic streptococci or Arcanobacterium haemolyticum isolated.    (Throat special examined for beta hemolytic streptococci and    Arcanobacterium haemolyticum)    Culture - Fungal, Other (collected 07-05-23 @ 12:00)  Source: .Other Other, throat  Preliminary Report (07-06-23 @ 13:24):    Testing in progress    Urinalysis Basic - ( 05 Jul 2023 07:55 )  Color: x / Appearance: x / SG: x / pH: x  Gluc: 92 mg/dL / Ketone: x  / Bili: x / Urobili: x   Blood: x / Protein: x / Nitrite: x   Leuk Esterase: x / RBC: x / WBC x   Sq Epi: x / Non Sq Epi: x / Bacteria: x    Lactate, Blood: 1.1 mmol/L (07-05-23 @ 07:55)      IMAGING:  Xray Chest 2 Views PA/Lat:   Cardiac/mediastinum/hilum: Unremarkable.  Lung parenchyma/Pleura: No focal consolidation, effusion or pneumothorax.  Skeleton/soft tissues: Unremarkable.  Impression:  No radiographic evidence of acute cardiopulmonary disease.      CARDIOLOGY TESTING  12 Lead ECG:   Ventricular Rate 63 BPM  Atrial Rate 63 BPM  P-R Interval 190 ms  QRS Duration 102 ms  Q-T Interval 450 ms  QTC Calculation(Bazett) 460 ms  P Axis 69 degrees  R Axis 68 degrees  T Axis 55 degrees    Diagnosis Line Normal sinus rhythm  Cannot rule out Anterior infarct , age undetermined  Abnormal ECG    ASSESSMENT/PLAN:  Pt is a 49yo F with a pmhx obesity, gastric bypass, PUD, anxiety disorder, candida esophagitis after being on prolonged steroids for hearing loss whom reports being first diagnosed with candida esophagitis by egd 9 months ago, follows with Dr Gomez and has received several prolonged course of diflucan with intermittent improvement in sx. Pt followed up with Dr Conway last week due to recurring sx of thrush and now has chest pain and so was recommended to go ed for fungal iv abx therapy and rule out lung infection. Pt denies associated fevers, chills, dysphagia, odynophagia, sob, melena, or hematochezia. Pt reports chest pain as constant, 4/10, sternal area, no aggravating or alleviating factors.    #candida esophagitis, thrush  -throat cx  -iv antifungal therapy  -f/u fungal bld cx  -f/u bld cx x2  -ID consult  -GI consult    #Chest Pain due to Acute Blood Loss Anemia, r/o cardiac etiology  #Acute Blood Loss Anemia, r/o GIB, hx of PUD or possibly from esophagitis   #Vaginal Bleed  -trend hgb  -transfuse 2u prbc  -IV PPI bid  - will get 2D echo, CTA Heart/Coronary Artery  - Will get TVUS and Pelvic US  -GI consult  -IVF  -f/u anemia workup sent in ED    #anxiety disorder  -c/w clonazepam and buspar    #Supportive Management:  Dispo:   DVT Ppx: Heparin sq  GI Ppx: pantoprazole    Tablet 40 milliGRAM(s) Oral before breakfast  Diet:  Diet, Regular (07-05-23 @ 11:02) [Active]      Total time spent to complete patient's bedside assessment, review medical chart, discuss medical plan of care with covering medical team was more than 45 minutes with >50% of time spent face to face with patient, discussion with patient/family and/or coordination of care    Fabien Bright MD/Pina  Attending Physician MEDICINE ATTENDING PROGRESS NOTE  Pt is a 49yo F with a pmhx obesity, gastric bypass, PUD, anxiety disorder, candida esophagitis after being on prolonged steroids for hearing loss whom reports being first diagnosed with candida esophagitis by egd 9 months ago, follows with Dr Gomez and has received several prolonged course of diflucan with intermittent improvement in sx. Pt followed up with Dr Conway last week due to recurring sx of thrush and now has chest pain and so was recommended to go ed for fungal iv abx therapy and rule out lung infection. Pt denies associated fevers, chills, dysphagia, odynophagia, sob, melena, or hematochezia. Pt reports chest pain as constant, 4/10, sternal area, no aggravating or alleviating factors..  (05 Jul 2023 11:03)    Interval/Overnight Events  reported Chest tightness overnight, now resolved  had CTA Heart: benign  Appreciate cardiology input    ROS  General: Denies fevers, chills, nightsweats, weight loss  HEENT: Denies headache, rhinorrhea, sore throat, eye pain  CV: Denies CP, palpitations  PULM: Denies SOB, cough  GI: Denies abdominal pain, diarrhea  : Denies dysuria, hematuria  MSK: Denies arthralgias  SKIN: Denies rash   NEURO: Denies paresthesias, weakness  PSYCH: Denies depression    MEDICATIONS  (STANDING):  busPIRone 10 milliGRAM(s) Oral two times a day  caspofungin IVPB      caspofungin IVPB 50 milliGRAM(s) IV Intermittent every 24 hours  chlorhexidine 4% Liquid 1 Application(s) Topical <User Schedule>  pantoprazole    Tablet 40 milliGRAM(s) Oral before breakfast    MEDICATIONS  (PRN):  acetaminophen     Tablet .. 650 milliGRAM(s) Oral every 6 hours PRN Temp greater or equal to 38C (100.4F), Mild Pain (1 - 3)  aluminum hydroxide/magnesium hydroxide/simethicone Suspension 30 milliLiter(s) Oral every 4 hours PRN Dyspepsia  clonazePAM  Tablet 0.25 milliGRAM(s) Oral daily PRN for anxiety  melatonin 3 milliGRAM(s) Oral at bedtime PRN Insomnia  ondansetron Injectable 4 milliGRAM(s) IV Push every 8 hours PRN Nausea and/or Vomiting    ANTIBIOTICS:  caspofungin IVPB      caspofungin IVPB 50 milliGRAM(s) IV Intermittent every 24 hours    VITALS:  Vital Signs Last 24 Hrs  T(C): 35.9 (08 Jul 2023 05:10), Max: 35.9 (08 Jul 2023 05:10)  T(F): 96.7 (08 Jul 2023 05:10), Max: 96.7 (08 Jul 2023 05:10)  HR: 60 (08 Jul 2023 05:10) (60 - 64)  BP: 108/60 (08 Jul 2023 05:10) (102/49 - 108/60)  BP(mean): --  RR: 18 (08 Jul 2023 05:10) (18 - 18)  SpO2: 98% (08 Jul 2023 05:10) (98% - 99%)    Parameters below as of 08 Jul 2023 05:10  Patient On (Oxygen Delivery Method): room air    PHYSICAL EXAM:  Gen: NAD, resting in bed  HEENT: Normocephalic, atraumatic  Neck: supple, no lymphadenopathy  CV: Regular rate & regular rhythm  Lungs: CTABL no wheeze  Abdomen: Soft, NTND+ BS present  Ext: Warm, well perfused no CCE  Neuro: non focal, awake, CN II-XII intact   Skin: no rash, no erythema  Psych: no SI, HI, Hallucination     LABS/MICROBIOLOGY:                CARDIAC MARKERS ( 07 Jul 2023 08:00 )  x     / <0.01 ng/mL / x     / x     / x      CARDIAC MARKERS ( 06 Jul 2023 21:35 )  x     / <0.01 ng/mL / x     / x     / x      CARDIAC MARKERS ( 06 Jul 2023 15:42 )  x     / 0.03 ng/mL / x     / x     / x          MICROBIOLOGY  Culture - Throat, Special (collected 07-05-23 @ 12:00)  Source: .Throat Throat  Final Report (07-07-23 @ 14:17):    No beta hemolytic streptococci or Arcanobacterium haemolyticum isolated.    (Throat special examined for beta hemolytic streptococci and    Arcanobacterium haemolyticum)    Culture - Fungal, Other (collected 07-05-23 @ 12:00)  Source: .Other Other, throat  Preliminary Report (07-06-23 @ 13:24):    Testing in progress    Urinalysis Basic - ( 05 Jul 2023 07:55 )  Color: x / Appearance: x / SG: x / pH: x  Gluc: 92 mg/dL / Ketone: x  / Bili: x / Urobili: x   Blood: x / Protein: x / Nitrite: x   Leuk Esterase: x / RBC: x / WBC x   Sq Epi: x / Non Sq Epi: x / Bacteria: x    Lactate, Blood: 1.1 mmol/L (07-05-23 @ 07:55)      IMAGING:  Xray Chest 2 Views PA/Lat:   Cardiac/mediastinum/hilum: Unremarkable.  Lung parenchyma/Pleura: No focal consolidation, effusion or pneumothorax.  Skeleton/soft tissues: Unremarkable.  Impression:  No radiographic evidence of acute cardiopulmonary disease.      CARDIOLOGY TESTING  12 Lead ECG:   Ventricular Rate 63 BPM  Atrial Rate 63 BPM  P-R Interval 190 ms  QRS Duration 102 ms  Q-T Interval 450 ms  QTC Calculation(Bazett) 460 ms  P Axis 69 degrees  R Axis 68 degrees  T Axis 55 degrees    Diagnosis Line Normal sinus rhythm  Cannot rule out Anterior infarct , age undetermined  Abnormal ECG    ASSESSMENT/PLAN:  Pt is a 49yo F with a pmhx obesity, gastric bypass, PUD, anxiety disorder, candida esophagitis after being on prolonged steroids for hearing loss whom reports being first diagnosed with candida esophagitis by egd 9 months ago, follows with Dr Gomez and has received several prolonged course of diflucan with intermittent improvement in sx. Pt followed up with Dr Conway last week due to recurring sx of thrush and now has chest pain and so was recommended to go ed for fungal iv abx therapy and rule out lung infection. Pt denies associated fevers, chills, dysphagia, odynophagia, sob, melena, or hematochezia. Pt reports chest pain as constant, 4/10, sternal area, no aggravating or alleviating factors.    #candida esophagitis, thrush  -throat cx  -iv antifungal therapy  -f/u fungal bld cx  -f/u bld cx x2  -ID consult  -GI consult    #Chest Pain due to Acute Blood Loss Anemia, cannot r/o cardiac etiology  #Acute Blood Loss Anemia, r/o GIB, hx of PUD or possibly from esophagitis   #Vaginal Bleed  -trend hgb  -transfused 3u prbc  -IV PPI bid  -2D echo, CTA Heart/Coronary Artery reviewed  - TVUS and Pelvic US benign  -GI consult  -IVF  -f/u anemia workup sent in ED    #anxiety disorder  -c/w clonazepam and buspar    #Supportive Management:  Dispo:   DVT Ppx: Heparin sq  GI Ppx: pantoprazole    Tablet 40 milliGRAM(s) Oral before breakfast  Diet:  Diet, Regular (07-05-23 @ 11:02) [Active]      Total time spent to complete patient's bedside assessment, review medical chart, discuss medical plan of care with covering medical team was more than 45 minutes with >50% of time spent face to face with patient, discussion with patient/family and/or coordination of care    Fabien Bright MD/Pina  Attending Physician

## 2023-07-09 LAB
ALBUMIN SERPL ELPH-MCNC: 3.9 G/DL — SIGNIFICANT CHANGE UP (ref 3.5–5.2)
ALP SERPL-CCNC: 40 U/L — SIGNIFICANT CHANGE UP (ref 30–115)
ALT FLD-CCNC: 9 U/L — SIGNIFICANT CHANGE UP (ref 0–41)
ANION GAP SERPL CALC-SCNC: 8 MMOL/L — SIGNIFICANT CHANGE UP (ref 7–14)
AST SERPL-CCNC: 14 U/L — SIGNIFICANT CHANGE UP (ref 0–41)
BASOPHILS # BLD AUTO: 0.04 K/UL — SIGNIFICANT CHANGE UP (ref 0–0.2)
BASOPHILS NFR BLD AUTO: 1.1 % — HIGH (ref 0–1)
BILIRUB SERPL-MCNC: 0.3 MG/DL — SIGNIFICANT CHANGE UP (ref 0.2–1.2)
BUN SERPL-MCNC: 12 MG/DL — SIGNIFICANT CHANGE UP (ref 10–20)
CALCIUM SERPL-MCNC: 8.7 MG/DL — SIGNIFICANT CHANGE UP (ref 8.4–10.5)
CHLORIDE SERPL-SCNC: 103 MMOL/L — SIGNIFICANT CHANGE UP (ref 98–110)
CO2 SERPL-SCNC: 29 MMOL/L — SIGNIFICANT CHANGE UP (ref 17–32)
CREAT SERPL-MCNC: 0.5 MG/DL — LOW (ref 0.7–1.5)
EGFR: 116 ML/MIN/1.73M2 — SIGNIFICANT CHANGE UP
EOSINOPHIL # BLD AUTO: 0.28 K/UL — SIGNIFICANT CHANGE UP (ref 0–0.7)
EOSINOPHIL NFR BLD AUTO: 7.8 % — SIGNIFICANT CHANGE UP (ref 0–8)
GLUCOSE SERPL-MCNC: 91 MG/DL — SIGNIFICANT CHANGE UP (ref 70–99)
HCT VFR BLD CALC: 30.6 % — LOW (ref 37–47)
HGB BLD-MCNC: 8.5 G/DL — LOW (ref 12–16)
IMM GRANULOCYTES NFR BLD AUTO: 0.3 % — SIGNIFICANT CHANGE UP (ref 0.1–0.3)
LYMPHOCYTES # BLD AUTO: 1.48 K/UL — SIGNIFICANT CHANGE UP (ref 1.2–3.4)
LYMPHOCYTES # BLD AUTO: 41.1 % — SIGNIFICANT CHANGE UP (ref 20.5–51.1)
MAGNESIUM SERPL-MCNC: 2.2 MG/DL — SIGNIFICANT CHANGE UP (ref 1.8–2.4)
MCHC RBC-ENTMCNC: 16.1 PG — LOW (ref 27–31)
MCHC RBC-ENTMCNC: 27.8 G/DL — LOW (ref 32–37)
MCV RBC AUTO: 58 FL — LOW (ref 81–99)
MONOCYTES # BLD AUTO: 0.27 K/UL — SIGNIFICANT CHANGE UP (ref 0.1–0.6)
MONOCYTES NFR BLD AUTO: 7.5 % — SIGNIFICANT CHANGE UP (ref 1.7–9.3)
NEUTROPHILS # BLD AUTO: 1.52 K/UL — SIGNIFICANT CHANGE UP (ref 1.4–6.5)
NEUTROPHILS NFR BLD AUTO: 42.2 % — SIGNIFICANT CHANGE UP (ref 42.2–75.2)
NRBC # BLD: 0 /100 WBCS — SIGNIFICANT CHANGE UP (ref 0–0)
PHOSPHATE SERPL-MCNC: 4.7 MG/DL — SIGNIFICANT CHANGE UP (ref 2.1–4.9)
PLATELET # BLD AUTO: 205 K/UL — SIGNIFICANT CHANGE UP (ref 130–400)
PMV BLD: SIGNIFICANT CHANGE UP (ref 7.4–10.4)
POTASSIUM SERPL-MCNC: 4.6 MMOL/L — SIGNIFICANT CHANGE UP (ref 3.5–5)
POTASSIUM SERPL-SCNC: 4.6 MMOL/L — SIGNIFICANT CHANGE UP (ref 3.5–5)
PROT SERPL-MCNC: 5.5 G/DL — LOW (ref 6–8)
RBC # BLD: 5.28 M/UL — SIGNIFICANT CHANGE UP (ref 4.2–5.4)
RBC # FLD: SIGNIFICANT CHANGE UP % (ref 11.5–14.5)
SODIUM SERPL-SCNC: 140 MMOL/L — SIGNIFICANT CHANGE UP (ref 135–146)
WBC # BLD: 3.6 K/UL — LOW (ref 4.8–10.8)
WBC # FLD AUTO: 3.6 K/UL — LOW (ref 4.8–10.8)

## 2023-07-09 PROCEDURE — 99232 SBSQ HOSP IP/OBS MODERATE 35: CPT

## 2023-07-09 RX ORDER — CLONAZEPAM 1 MG
1 TABLET ORAL
Refills: 0 | Status: DISCONTINUED | OUTPATIENT
Start: 2023-07-09 | End: 2023-07-10

## 2023-07-09 RX ORDER — SODIUM CHLORIDE 9 MG/ML
1000 INJECTION, SOLUTION INTRAVENOUS
Refills: 0 | Status: DISCONTINUED | OUTPATIENT
Start: 2023-07-09 | End: 2023-07-10

## 2023-07-09 RX ADMIN — Medication 3 MILLIGRAM(S): at 21:58

## 2023-07-09 RX ADMIN — PANTOPRAZOLE SODIUM 40 MILLIGRAM(S): 20 TABLET, DELAYED RELEASE ORAL at 06:42

## 2023-07-09 RX ADMIN — Medication 650 MILLIGRAM(S): at 11:54

## 2023-07-09 RX ADMIN — Medication 10 MILLIGRAM(S): at 06:41

## 2023-07-09 RX ADMIN — Medication 10 MILLIGRAM(S): at 18:00

## 2023-07-09 RX ADMIN — SODIUM CHLORIDE 100 MILLILITER(S): 9 INJECTION, SOLUTION INTRAVENOUS at 21:54

## 2023-07-09 RX ADMIN — CASPOFUNGIN ACETATE 260 MILLIGRAM(S): 7 INJECTION, POWDER, LYOPHILIZED, FOR SOLUTION INTRAVENOUS at 11:55

## 2023-07-09 RX ADMIN — Medication 1 MILLIGRAM(S): at 20:24

## 2023-07-09 RX ADMIN — HEPARIN SODIUM 5000 UNIT(S): 5000 INJECTION INTRAVENOUS; SUBCUTANEOUS at 18:00

## 2023-07-09 RX ADMIN — Medication 650 MILLIGRAM(S): at 12:24

## 2023-07-09 NOTE — PROGRESS NOTE ADULT - SUBJECTIVE AND OBJECTIVE BOX
MEDICINE ATTENDING PROGRESS NOTE  Pt is a 47yo F with a pmhx obesity, gastric bypass, PUD, anxiety disorder, candida esophagitis after being on prolonged steroids for hearing loss whom reports being first diagnosed with candida esophagitis by egd 9 months ago, follows with Dr Gomez and has received several prolonged course of diflucan with intermittent improvement in sx. Pt followed up with Dr Conway last week due to recurring sx of thrush and now has chest pain and so was recommended to go ed for fungal iv abx therapy and rule out lung infection. Pt denies associated fevers, chills, dysphagia, odynophagia, sob, melena, or hematochezia. Pt reports chest pain as constant, 4/10, sternal area, no aggravating or alleviating factors..  (05 Jul 2023 11:03)    Interval/Overnight Events  No issue today  pending PICC placement by IR  dc pending PICC line placement    ROS  General: Denies fevers, chills, nightsweats, weight loss  HEENT: Denies headache, rhinorrhea, sore throat, eye pain  CV: Denies CP, palpitations  PULM: Denies SOB, cough  GI: Denies abdominal pain, diarrhea  : Denies dysuria, hematuria  MSK: Denies arthralgias  SKIN: Denies rash   NEURO: Denies paresthesias, weakness  PSYCH: Denies depression    MEDICATIONS  (STANDING):  busPIRone 10 milliGRAM(s) Oral two times a day  caspofungin IVPB      caspofungin IVPB 50 milliGRAM(s) IV Intermittent every 24 hours  chlorhexidine 4% Liquid 1 Application(s) Topical <User Schedule>  pantoprazole    Tablet 40 milliGRAM(s) Oral before breakfast    MEDICATIONS  (PRN):  acetaminophen     Tablet .. 650 milliGRAM(s) Oral every 6 hours PRN Temp greater or equal to 38C (100.4F), Mild Pain (1 - 3)  aluminum hydroxide/magnesium hydroxide/simethicone Suspension 30 milliLiter(s) Oral every 4 hours PRN Dyspepsia  clonazePAM  Tablet 0.25 milliGRAM(s) Oral daily PRN for anxiety  melatonin 3 milliGRAM(s) Oral at bedtime PRN Insomnia  ondansetron Injectable 4 milliGRAM(s) IV Push every 8 hours PRN Nausea and/or Vomiting    ANTIBIOTICS:  caspofungin IVPB      caspofungin IVPB 50 milliGRAM(s) IV Intermittent every 24 hours    VITALS:  Vital Signs Last 24 Hrs  T(C): 36.4 (07-09-23 @ 06:00), Max: 36.4 (07-09-23 @ 06:00)  T(F): 97.5 (07-09-23 @ 06:00), Max: 97.5 (07-09-23 @ 06:00)  HR: 55 (07-09-23 @ 06:00) (55 - 74)  BP: 101/49 (07-09-23 @ 06:00) (101/49 - 108/51)  BP(mean): --  RR: 18 (07-09-23 @ 06:00) (18 - 18)  SpO2: 99% (07-09-23 @ 06:00) (98% - 99%)    PHYSICAL EXAM:  Gen: NAD, resting in bed  HEENT: Normocephalic, atraumatic  Neck: supple, no lymphadenopathy  CV: Regular rate & regular rhythm  Lungs: CTABL no wheeze  Abdomen: Soft, NTND+ BS present  Ext: Warm, well perfused no CCE  Neuro: non focal, awake, CN II-XII intact   Skin: no rash, no erythema  Psych: no SI, HI, Hallucination     LABS/MICROBIOLOGY:          07-09    140  |  103  |  12  ----------------------------<  91  4.6   |  29  |  0.5<L>    Ca    8.7      09 Jul 2023 06:53  Phos  4.7     07-09  Mg     2.2     07-09    TPro  5.5<L>  /  Alb  3.9  /  TBili  0.3  /  DBili  x   /  AST  14  /  ALT  9   /  AlkPhos  40  07-09    LIVER FUNCTIONS - ( 09 Jul 2023 06:53 )  Alb: 3.9 g/dL / Pro: 5.5 g/dL / ALK PHOS: 40 U/L / ALT: 9 U/L / AST: 14 U/L / GGT: x                                 8.5    3.60  )-----------( 205      ( 09 Jul 2023 06:53 )             30.6     CARDIAC MARKERS ( 08 Jul 2023 11:30 )  x     / <0.01 ng/mL / x     / x     / x          MICROBIOLOGY  Culture - Throat, Special (collected 07-05-23 @ 12:00)  Source: .Throat Throat  Final Report (07-07-23 @ 14:17):    No beta hemolytic streptococci or Arcanobacterium haemolyticum isolated.    (Throat special examined for beta hemolytic streptococci and    Arcanobacterium haemolyticum)    Culture - Fungal, Other (collected 07-05-23 @ 12:00)  Source: .Other Other, throat  Preliminary Report (07-06-23 @ 13:24):    Testing in progress    Urinalysis Basic - ( 05 Jul 2023 07:55 )  Color: x / Appearance: x / SG: x / pH: x  Gluc: 92 mg/dL / Ketone: x  / Bili: x / Urobili: x   Blood: x / Protein: x / Nitrite: x   Leuk Esterase: x / RBC: x / WBC x   Sq Epi: x / Non Sq Epi: x / Bacteria: x    Lactate, Blood: 1.1 mmol/L (07-05-23 @ 07:55)      IMAGING:  Xray Chest 2 Views PA/Lat:   Cardiac/mediastinum/hilum: Unremarkable.  Lung parenchyma/Pleura: No focal consolidation, effusion or pneumothorax.  Skeleton/soft tissues: Unremarkable.  Impression:  No radiographic evidence of acute cardiopulmonary disease.      CARDIOLOGY TESTING  12 Lead ECG:   Ventricular Rate 63 BPM  Atrial Rate 63 BPM  P-R Interval 190 ms  QRS Duration 102 ms  Q-T Interval 450 ms  QTC Calculation(Bazett) 460 ms  P Axis 69 degrees  R Axis 68 degrees  T Axis 55 degrees    Diagnosis Line Normal sinus rhythm  Cannot rule out Anterior infarct , age undetermined  Abnormal ECG    ASSESSMENT/PLAN:  Pt is a 47yo F with a pmhx obesity, gastric bypass, PUD, anxiety disorder, candida esophagitis after being on prolonged steroids for hearing loss whom reports being first diagnosed with candida esophagitis by egd 9 months ago, follows with Dr Gomez and has received several prolonged course of diflucan with intermittent improvement in sx. Pt followed up with Dr Conway last week due to recurring sx of thrush and now has chest pain and so was recommended to go ed for fungal iv abx therapy and rule out lung infection. Pt denies associated fevers, chills, dysphagia, odynophagia, sob, melena, or hematochezia. Pt reports chest pain as constant, 4/10, sternal area, no aggravating or alleviating factors.    #candida esophagitis, thrush  -throat cx  -iv antifungal therapy  -f/u fungal bld cx  -f/u bld cx x2  -ID consult  -GI consult    #Chest Pain due to Acute Blood Loss Anemia, cannot r/o cardiac etiology  #Acute Blood Loss Anemia, r/o GIB, hx of PUD or possibly from esophagitis   #Vaginal Bleed  -trend hgb  -transfused 3u prbc  -IV PPI bid  -2D echo, CTA Heart/Coronary Artery reviewed  - TVUS and Pelvic US benign  -GI consult  -IVF  -f/u anemia workup sent in ED    #anxiety disorder  -c/w clonazepam and buspar    #Supportive Management:  Dispo:   DVT Ppx: Heparin sq  GI Ppx: pantoprazole    Tablet 40 milliGRAM(s) Oral before breakfast  Diet:  Diet, Regular (07-05-23 @ 11:02) [Active]    Total time spent to complete patient's bedside assessment, review medical chart, discuss medical plan of care with covering medical team was more than 45 minutes with >50% of time spent face to face with patient, discussion with patient/family and/or coordination of care    Fabien Bright MD/[place]  Attending Physician

## 2023-07-10 ENCOUNTER — TRANSCRIPTION ENCOUNTER (OUTPATIENT)
Age: 48
End: 2023-07-10

## 2023-07-10 VITALS
SYSTOLIC BLOOD PRESSURE: 104 MMHG | HEART RATE: 61 BPM | DIASTOLIC BLOOD PRESSURE: 52 MMHG | RESPIRATION RATE: 18 BRPM | TEMPERATURE: 98 F

## 2023-07-10 LAB
ALBUMIN SERPL ELPH-MCNC: 3.9 G/DL — SIGNIFICANT CHANGE UP (ref 3.5–5.2)
ALP SERPL-CCNC: 41 U/L — SIGNIFICANT CHANGE UP (ref 30–115)
ALT FLD-CCNC: 10 U/L — SIGNIFICANT CHANGE UP (ref 0–41)
ANION GAP SERPL CALC-SCNC: 6 MMOL/L — LOW (ref 7–14)
ANISOCYTOSIS BLD QL: SIGNIFICANT CHANGE UP
AST SERPL-CCNC: 14 U/L — SIGNIFICANT CHANGE UP (ref 0–41)
BASOPHILS # BLD AUTO: 0.04 K/UL — SIGNIFICANT CHANGE UP (ref 0–0.2)
BASOPHILS NFR BLD AUTO: 1.2 % — HIGH (ref 0–1)
BILIRUB SERPL-MCNC: 0.2 MG/DL — SIGNIFICANT CHANGE UP (ref 0.2–1.2)
BUN SERPL-MCNC: 12 MG/DL — SIGNIFICANT CHANGE UP (ref 10–20)
CALCIUM SERPL-MCNC: 8.7 MG/DL — SIGNIFICANT CHANGE UP (ref 8.4–10.5)
CHLORIDE SERPL-SCNC: 105 MMOL/L — SIGNIFICANT CHANGE UP (ref 98–110)
CO2 SERPL-SCNC: 28 MMOL/L — SIGNIFICANT CHANGE UP (ref 17–32)
CREAT SERPL-MCNC: 0.5 MG/DL — LOW (ref 0.7–1.5)
CULTURE RESULTS: SIGNIFICANT CHANGE UP
CULTURE RESULTS: SIGNIFICANT CHANGE UP
DACRYOCYTES BLD QL SMEAR: SLIGHT — SIGNIFICANT CHANGE UP
EGFR: 116 ML/MIN/1.73M2 — SIGNIFICANT CHANGE UP
ELLIPTOCYTES BLD QL SMEAR: SIGNIFICANT CHANGE UP
EOSINOPHIL # BLD AUTO: 0.26 K/UL — SIGNIFICANT CHANGE UP (ref 0–0.7)
EOSINOPHIL NFR BLD AUTO: 7.6 % — SIGNIFICANT CHANGE UP (ref 0–8)
GLUCOSE SERPL-MCNC: 84 MG/DL — SIGNIFICANT CHANGE UP (ref 70–99)
HCT VFR BLD CALC: 30.7 % — LOW (ref 37–47)
HGB BLD-MCNC: 8.4 G/DL — LOW (ref 12–16)
HYPOCHROMIA BLD QL: SIGNIFICANT CHANGE UP
IMM GRANULOCYTES NFR BLD AUTO: 0.3 % — SIGNIFICANT CHANGE UP (ref 0.1–0.3)
LG PLATELETS BLD QL AUTO: SLIGHT — SIGNIFICANT CHANGE UP
LYMPHOCYTES # BLD AUTO: 1.4 K/UL — SIGNIFICANT CHANGE UP (ref 1.2–3.4)
LYMPHOCYTES # BLD AUTO: 40.8 % — SIGNIFICANT CHANGE UP (ref 20.5–51.1)
MACROCYTES BLD QL: SLIGHT — SIGNIFICANT CHANGE UP
MAGNESIUM SERPL-MCNC: 2.1 MG/DL — SIGNIFICANT CHANGE UP (ref 1.8–2.4)
MANUAL SMEAR VERIFICATION: SIGNIFICANT CHANGE UP
MCHC RBC-ENTMCNC: 15.8 PG — LOW (ref 27–31)
MCHC RBC-ENTMCNC: 27.4 G/DL — LOW (ref 32–37)
MCV RBC AUTO: 57.9 FL — LOW (ref 81–99)
MICROCYTES BLD QL: SIGNIFICANT CHANGE UP
MONOCYTES # BLD AUTO: 0.18 K/UL — SIGNIFICANT CHANGE UP (ref 0.1–0.6)
MONOCYTES NFR BLD AUTO: 5.2 % — SIGNIFICANT CHANGE UP (ref 1.7–9.3)
NEUTROPHILS # BLD AUTO: 1.54 K/UL — SIGNIFICANT CHANGE UP (ref 1.4–6.5)
NEUTROPHILS NFR BLD AUTO: 44.9 % — SIGNIFICANT CHANGE UP (ref 42.2–75.2)
NRBC # BLD: 0 /100 WBCS — SIGNIFICANT CHANGE UP (ref 0–0)
OVALOCYTES BLD QL SMEAR: SIGNIFICANT CHANGE UP
PLAT MORPH BLD: ABNORMAL
PLATELET # BLD AUTO: 213 K/UL — SIGNIFICANT CHANGE UP (ref 130–400)
PMV BLD: SIGNIFICANT CHANGE UP (ref 7.4–10.4)
POIKILOCYTOSIS BLD QL AUTO: SIGNIFICANT CHANGE UP
POTASSIUM SERPL-MCNC: 5 MMOL/L — SIGNIFICANT CHANGE UP (ref 3.5–5)
POTASSIUM SERPL-SCNC: 5 MMOL/L — SIGNIFICANT CHANGE UP (ref 3.5–5)
PROT SERPL-MCNC: 5.6 G/DL — LOW (ref 6–8)
RBC # BLD: 5.3 M/UL — SIGNIFICANT CHANGE UP (ref 4.2–5.4)
RBC # FLD: 35.9 % — HIGH (ref 11.5–14.5)
RBC BLD AUTO: ABNORMAL
SCHISTOCYTES BLD QL AUTO: SIGNIFICANT CHANGE UP
SODIUM SERPL-SCNC: 139 MMOL/L — SIGNIFICANT CHANGE UP (ref 135–146)
SPECIMEN SOURCE: SIGNIFICANT CHANGE UP
SPECIMEN SOURCE: SIGNIFICANT CHANGE UP
TARGETS BLD QL SMEAR: SLIGHT — SIGNIFICANT CHANGE UP
WBC # BLD: 3.43 K/UL — LOW (ref 4.8–10.8)
WBC # FLD AUTO: 3.43 K/UL — LOW (ref 4.8–10.8)

## 2023-07-10 PROCEDURE — 99239 HOSP IP/OBS DSCHRG MGMT >30: CPT

## 2023-07-10 PROCEDURE — 36573 INSJ PICC RS&I 5 YR+: CPT

## 2023-07-10 RX ADMIN — PANTOPRAZOLE SODIUM 40 MILLIGRAM(S): 20 TABLET, DELAYED RELEASE ORAL at 05:52

## 2023-07-10 RX ADMIN — CASPOFUNGIN ACETATE 260 MILLIGRAM(S): 7 INJECTION, POWDER, LYOPHILIZED, FOR SOLUTION INTRAVENOUS at 13:20

## 2023-07-10 RX ADMIN — Medication 10 MILLIGRAM(S): at 05:52

## 2023-07-10 NOTE — PROGRESS NOTE ADULT - SUBJECTIVE AND OBJECTIVE BOX
medically stable to dc home today after PICC placement, and infusion of antifungal med through PICC line. Patient is aware of the plan.

## 2023-07-10 NOTE — CHART NOTE - NSCHARTNOTEFT_GEN_A_CORE
PA notified to discharge patient as per attending.  Case discussed with Dr. Bright.  Med Reconciliation reviewed  and advised the meds to continue.  Patient stable no changes.  Patient agreeable to discharge.        T(C): 36.7 (07-10-23 @ 11:01), Max: 36.7 (07-10-23 @ 11:01)  HR: 56 (07-10-23 @ 11:01) (56 - 77)  BP: 102/55 (07-10-23 @ 11:01) (100/44 - 115/56)  RR: 18 (07-10-23 @ 11:01) (17 - 18)  SpO2: 100% (07-10-23 @ 11:01) (98% - 100%)        Patient discharged home. RN aware and will manage.

## 2023-07-10 NOTE — DISCHARGE NOTE NURSING/CASE MANAGEMENT/SOCIAL WORK - PATIENT PORTAL LINK FT
You can access the FollowMyHealth Patient Portal offered by [facility] by registering at the following website: [email or web address]. By joining [employer]’s FollowMyHealth portal, you will also be able to view your health information using other applications (apps) compatible with our system.

## 2023-07-10 NOTE — PROCEDURE NOTE - ADDITIONAL PROCEDURE DETAILS
Bard PowerPICC used  Tip in SVC, 42cm, ok to use   *pre-existing right sided midline exchanged for new 5Fr single lumen PICC line*

## 2023-07-12 LAB
CULTURE RESULTS: SIGNIFICANT CHANGE UP
SPECIMEN SOURCE: SIGNIFICANT CHANGE UP

## 2023-07-14 DIAGNOSIS — F41.9 ANXIETY DISORDER, UNSPECIFIED: ICD-10-CM

## 2023-07-14 DIAGNOSIS — R07.9 CHEST PAIN, UNSPECIFIED: ICD-10-CM

## 2023-07-14 DIAGNOSIS — I10 ESSENTIAL (PRIMARY) HYPERTENSION: ICD-10-CM

## 2023-07-14 DIAGNOSIS — B37.81 CANDIDAL ESOPHAGITIS: ICD-10-CM

## 2023-07-14 DIAGNOSIS — D56.3 THALASSEMIA MINOR: ICD-10-CM

## 2023-07-14 DIAGNOSIS — Z99.89 DEPENDENCE ON OTHER ENABLING MACHINES AND DEVICES: ICD-10-CM

## 2023-07-14 DIAGNOSIS — E78.5 HYPERLIPIDEMIA, UNSPECIFIED: ICD-10-CM

## 2023-07-14 DIAGNOSIS — G47.33 OBSTRUCTIVE SLEEP APNEA (ADULT) (PEDIATRIC): ICD-10-CM

## 2023-07-14 DIAGNOSIS — D62 ACUTE POSTHEMORRHAGIC ANEMIA: ICD-10-CM

## 2023-07-14 DIAGNOSIS — F17.210 NICOTINE DEPENDENCE, CIGARETTES, UNCOMPLICATED: ICD-10-CM

## 2023-07-15 ENCOUNTER — EMERGENCY (EMERGENCY)
Facility: HOSPITAL | Age: 48
LOS: 0 days | Discharge: HOME | End: 2023-07-15
Attending: STUDENT IN AN ORGANIZED HEALTH CARE EDUCATION/TRAINING PROGRAM
Payer: COMMERCIAL

## 2023-07-15 VITALS
SYSTOLIC BLOOD PRESSURE: 123 MMHG | DIASTOLIC BLOOD PRESSURE: 60 MMHG | HEIGHT: 68 IN | RESPIRATION RATE: 18 BRPM | OXYGEN SATURATION: 99 % | TEMPERATURE: 98 F | HEART RATE: 74 BPM

## 2023-07-15 DIAGNOSIS — F41.9 ANXIETY DISORDER, UNSPECIFIED: ICD-10-CM

## 2023-07-15 DIAGNOSIS — Z98.51 TUBAL LIGATION STATUS: Chronic | ICD-10-CM

## 2023-07-15 DIAGNOSIS — Z87.19 PERSONAL HISTORY OF OTHER DISEASES OF THE DIGESTIVE SYSTEM: ICD-10-CM

## 2023-07-15 DIAGNOSIS — R51.9 HEADACHE, UNSPECIFIED: ICD-10-CM

## 2023-07-15 DIAGNOSIS — R07.89 OTHER CHEST PAIN: ICD-10-CM

## 2023-07-15 DIAGNOSIS — Z86.19 PERSONAL HISTORY OF OTHER INFECTIOUS AND PARASITIC DISEASES: ICD-10-CM

## 2023-07-15 DIAGNOSIS — R42 DIZZINESS AND GIDDINESS: ICD-10-CM

## 2023-07-15 DIAGNOSIS — Z90.09 ACQUIRED ABSENCE OF OTHER PART OF HEAD AND NECK: ICD-10-CM

## 2023-07-15 DIAGNOSIS — G47.33 OBSTRUCTIVE SLEEP APNEA (ADULT) (PEDIATRIC): ICD-10-CM

## 2023-07-15 DIAGNOSIS — Z86.2 PERSONAL HISTORY OF DISEASES OF THE BLOOD AND BLOOD-FORMING ORGANS AND CERTAIN DISORDERS INVOLVING THE IMMUNE MECHANISM: ICD-10-CM

## 2023-07-15 DIAGNOSIS — Z98.84 BARIATRIC SURGERY STATUS: ICD-10-CM

## 2023-07-15 DIAGNOSIS — F50.89 OTHER SPECIFIED EATING DISORDER: ICD-10-CM

## 2023-07-15 DIAGNOSIS — D64.9 ANEMIA, UNSPECIFIED: ICD-10-CM

## 2023-07-15 DIAGNOSIS — Z98.890 OTHER SPECIFIED POSTPROCEDURAL STATES: Chronic | ICD-10-CM

## 2023-07-15 DIAGNOSIS — Z90.89 ACQUIRED ABSENCE OF OTHER ORGANS: Chronic | ICD-10-CM

## 2023-07-15 DIAGNOSIS — Z99.89 DEPENDENCE ON OTHER ENABLING MACHINES AND DEVICES: ICD-10-CM

## 2023-07-15 DIAGNOSIS — E78.5 HYPERLIPIDEMIA, UNSPECIFIED: ICD-10-CM

## 2023-07-15 DIAGNOSIS — Z98.51 TUBAL LIGATION STATUS: ICD-10-CM

## 2023-07-15 DIAGNOSIS — M19.90 UNSPECIFIED OSTEOARTHRITIS, UNSPECIFIED SITE: ICD-10-CM

## 2023-07-15 DIAGNOSIS — Z87.11 PERSONAL HISTORY OF PEPTIC ULCER DISEASE: ICD-10-CM

## 2023-07-15 DIAGNOSIS — R53.1 WEAKNESS: ICD-10-CM

## 2023-07-15 PROBLEM — B37.81 CANDIDAL ESOPHAGITIS: Chronic | Status: ACTIVE | Noted: 2023-07-05

## 2023-07-15 LAB
ALBUMIN SERPL ELPH-MCNC: 4.1 G/DL — SIGNIFICANT CHANGE UP (ref 3.5–5.2)
ALP SERPL-CCNC: 43 U/L — SIGNIFICANT CHANGE UP (ref 30–115)
ALT FLD-CCNC: 13 U/L — SIGNIFICANT CHANGE UP (ref 0–41)
ANION GAP SERPL CALC-SCNC: 8 MMOL/L — SIGNIFICANT CHANGE UP (ref 7–14)
APTT BLD: 30.7 SEC — SIGNIFICANT CHANGE UP (ref 27–39.2)
AST SERPL-CCNC: 15 U/L — SIGNIFICANT CHANGE UP (ref 0–41)
BASE EXCESS BLDA CALC-SCNC: 3.8 MMOL/L — HIGH (ref -2–3)
BASOPHILS # BLD AUTO: 0 K/UL — SIGNIFICANT CHANGE UP (ref 0–0.2)
BASOPHILS NFR BLD AUTO: 0 % — SIGNIFICANT CHANGE UP (ref 0–1)
BILIRUB SERPL-MCNC: 0.2 MG/DL — SIGNIFICANT CHANGE UP (ref 0.2–1.2)
BUN SERPL-MCNC: 13 MG/DL — SIGNIFICANT CHANGE UP (ref 10–20)
CALCIUM SERPL-MCNC: 8.9 MG/DL — SIGNIFICANT CHANGE UP (ref 8.4–10.5)
CHLORIDE SERPL-SCNC: 105 MMOL/L — SIGNIFICANT CHANGE UP (ref 98–110)
CO2 SERPL-SCNC: 27 MMOL/L — SIGNIFICANT CHANGE UP (ref 17–32)
COHGB MFR BLDA: 1.4 % — SIGNIFICANT CHANGE UP
CREAT SERPL-MCNC: <0.5 MG/DL — LOW (ref 0.7–1.5)
EGFR: 122 ML/MIN/1.73M2 — SIGNIFICANT CHANGE UP
EOSINOPHIL # BLD AUTO: 0.28 K/UL — SIGNIFICANT CHANGE UP (ref 0–0.7)
EOSINOPHIL NFR BLD AUTO: 7.2 % — SIGNIFICANT CHANGE UP (ref 0–8)
GLUCOSE SERPL-MCNC: 90 MG/DL — SIGNIFICANT CHANGE UP (ref 70–99)
HCG SERPL QL: NEGATIVE — SIGNIFICANT CHANGE UP
HCO3 BLDA-SCNC: 29 MMOL/L — HIGH (ref 21–28)
HCT VFR BLD CALC: 29.6 % — LOW (ref 37–47)
HGB BLD-MCNC: 8.2 G/DL — LOW (ref 12–16)
HGB BLDA-MCNC: 8.5 G/DL — LOW (ref 12.6–17.4)
INR BLD: 1.05 RATIO — SIGNIFICANT CHANGE UP (ref 0.65–1.3)
LYMPHOCYTES # BLD AUTO: 1.36 K/UL — SIGNIFICANT CHANGE UP (ref 1.2–3.4)
LYMPHOCYTES # BLD AUTO: 35.1 % — SIGNIFICANT CHANGE UP (ref 20.5–51.1)
MAGNESIUM SERPL-MCNC: 2 MG/DL — SIGNIFICANT CHANGE UP (ref 1.8–2.4)
MCHC RBC-ENTMCNC: 16.3 PG — LOW (ref 27–31)
MCHC RBC-ENTMCNC: 27.7 G/DL — LOW (ref 32–37)
MCV RBC AUTO: 58.7 FL — LOW (ref 81–99)
METHGB MFR BLDA: 0.2 % — SIGNIFICANT CHANGE UP
MONOCYTES # BLD AUTO: 0.07 K/UL — LOW (ref 0.1–0.6)
MONOCYTES NFR BLD AUTO: 1.8 % — SIGNIFICANT CHANGE UP (ref 1.7–9.3)
NEUTROPHILS # BLD AUTO: 2.13 K/UL — SIGNIFICANT CHANGE UP (ref 1.4–6.5)
NEUTROPHILS NFR BLD AUTO: 55 % — SIGNIFICANT CHANGE UP (ref 42.2–75.2)
NT-PROBNP SERPL-SCNC: 72 PG/ML — SIGNIFICANT CHANGE UP (ref 0–300)
OXYHGB MFR BLDA: 58.4 % — LOW (ref 90–95)
PCO2 BLDA: 47 MMHG — SIGNIFICANT CHANGE UP (ref 25–48)
PH BLDA: 7.4 — SIGNIFICANT CHANGE UP (ref 7.35–7.45)
PLATELET # BLD AUTO: 291 K/UL — SIGNIFICANT CHANGE UP (ref 130–400)
PMV BLD: SIGNIFICANT CHANGE UP (ref 7.4–10.4)
PO2 BLDA: 40 MMHG — CRITICAL LOW (ref 83–108)
POTASSIUM SERPL-MCNC: 4.2 MMOL/L — SIGNIFICANT CHANGE UP (ref 3.5–5)
POTASSIUM SERPL-SCNC: 4.2 MMOL/L — SIGNIFICANT CHANGE UP (ref 3.5–5)
PROT SERPL-MCNC: 5.6 G/DL — LOW (ref 6–8)
PROTHROM AB SERPL-ACNC: 12 SEC — SIGNIFICANT CHANGE UP (ref 9.95–12.87)
RBC # BLD: 5.04 M/UL — SIGNIFICANT CHANGE UP (ref 4.2–5.4)
RBC # FLD: 36.3 % — HIGH (ref 11.5–14.5)
SAO2 % BLDA: 59.3 % — LOW (ref 94–98)
SODIUM SERPL-SCNC: 140 MMOL/L — SIGNIFICANT CHANGE UP (ref 135–146)
TROPONIN T SERPL-MCNC: <0.01 NG/ML — SIGNIFICANT CHANGE UP
WBC # BLD: 3.87 K/UL — LOW (ref 4.8–10.8)
WBC # FLD AUTO: 3.87 K/UL — LOW (ref 4.8–10.8)

## 2023-07-15 PROCEDURE — 82805 BLOOD GASES W/O2 SATURATION: CPT

## 2023-07-15 PROCEDURE — 86850 RBC ANTIBODY SCREEN: CPT

## 2023-07-15 PROCEDURE — 85730 THROMBOPLASTIN TIME PARTIAL: CPT

## 2023-07-15 PROCEDURE — 85610 PROTHROMBIN TIME: CPT

## 2023-07-15 PROCEDURE — 84484 ASSAY OF TROPONIN QUANT: CPT

## 2023-07-15 PROCEDURE — 36415 COLL VENOUS BLD VENIPUNCTURE: CPT

## 2023-07-15 PROCEDURE — 83735 ASSAY OF MAGNESIUM: CPT

## 2023-07-15 PROCEDURE — 71045 X-RAY EXAM CHEST 1 VIEW: CPT | Mod: 26

## 2023-07-15 PROCEDURE — 86901 BLOOD TYPING SEROLOGIC RH(D): CPT

## 2023-07-15 PROCEDURE — 80053 COMPREHEN METABOLIC PANEL: CPT

## 2023-07-15 PROCEDURE — 99285 EMERGENCY DEPT VISIT HI MDM: CPT | Mod: 25

## 2023-07-15 PROCEDURE — 84703 CHORIONIC GONADOTROPIN ASSAY: CPT

## 2023-07-15 PROCEDURE — 93010 ELECTROCARDIOGRAM REPORT: CPT

## 2023-07-15 PROCEDURE — 93005 ELECTROCARDIOGRAM TRACING: CPT

## 2023-07-15 PROCEDURE — 83655 ASSAY OF LEAD: CPT

## 2023-07-15 PROCEDURE — 86900 BLOOD TYPING SEROLOGIC ABO: CPT

## 2023-07-15 PROCEDURE — 85025 COMPLETE CBC W/AUTO DIFF WBC: CPT

## 2023-07-15 PROCEDURE — 83880 ASSAY OF NATRIURETIC PEPTIDE: CPT

## 2023-07-15 PROCEDURE — 71045 X-RAY EXAM CHEST 1 VIEW: CPT

## 2023-07-15 PROCEDURE — 99284 EMERGENCY DEPT VISIT MOD MDM: CPT

## 2023-07-15 NOTE — ED ADULT TRIAGE NOTE - CHIEF COMPLAINT QUOTE
pt sts she was in the hospital about 4-5 days ago because her hemoglobin was very low and she had 3 blood transfusions. pt feels like her homoglobin is low again because she feels leg cramping, weak and has pica

## 2023-07-15 NOTE — ED PROVIDER NOTE - OBJECTIVE STATEMENT
48 year old female with a history of obesity, gastric bypass, PUD, anxiety disorder, candida esophagitis with PICC on caspofungin, recurrent anemia requiring transfusions presents to the ED with generalized weakness. Patient was recently discharged from [facility] after 3 transfusions and management of candida esophagitis. Now reports generalized weakness, headaches, muscle cramping, lightheadedness. She also reports that she has severe PICA from her anemia and has been eating burnt ends of matches (up to 1 matchbox every other day), ashes from marijuana, and is chewing on Gertrude. She has not adressed this with any providers 48 year old female with a history of obesity, gastric bypass, PUD, anxiety disorder, candida esophagitis with PICC on caspofungin, recurrent anemia requiring transfusions presents to the ED with generalized weakness. Patient was recently discharged from [facility] after 3 transfusions and management of candida esophagitis. Now reports generalized weakness, headaches, muscle cramping, lightheadedness. She also reports that she has severe PICA from her anemia and has been eating burnt ends of matches (up to 1 matchbox every other day), ashes from marijuana, and is chewing on [employer]. She has not addressed this with any providers to this point but has been ingesting the substances on and off since she was a young.

## 2023-07-15 NOTE — ED ADULT TRIAGE NOTE - HEART RATE (BEATS/MIN)
Spoke with Mark Coats, patient will now be going to [facility] so will no longer be needing HH services. 74

## 2023-07-15 NOTE — ED PROVIDER NOTE - NSFOLLOWUPINSTRUCTIONS_ED_ALL_ED_FT
Please follow up with hematology outpatient. Please stop ingesting any non food items and return to the ED if you have any new/concerning symptoms.     Anemia    Anemia is a condition in which the concentration of red blood cells or hemoglobin in the blood is below normal. Hemoglobin is a substance in red blood cells that carries oxygen to the tissues of the body. Anemia results in not enough oxygen reaching these tissues which can cause symptoms such as weakness, dizziness/lightheadedness, shortness of breath, chest pain, paleness, or nausea.    SEEK IMMEDIATE MEDICAL CARE IF YOU HAVE THE FOLLOWING SYMPTOMS: extreme weakness/chest pain/shortness of breath, black or bloody stools, vomiting blood, fainting, fever, or any signs of dehydration.

## 2023-07-15 NOTE — ED PROVIDER NOTE - CARE PLAN
1 Principal Discharge DX:	Generalized weakness  Secondary Diagnosis:	Anemia  Secondary Diagnosis:	Pica

## 2023-07-15 NOTE — CONSULT NOTE ADULT - SUBJECTIVE AND OBJECTIVE BOX
MEDICAL TOXICOLOGY CONSULT    HPI: 49 yo F, hx obesity, gastric bypass, anemia (iron deficiency anemia of unknown etiology, possible thalassemia minor?), HLD, HTN, GRECIA, osteoarthritis, peptic ulcer disease, anxiety, candidal esophagitis attributed to prolonged steroids for hearing loss, has picc line receiving capsofungin, who presents with multiple complaints. She has been having symptoms including dizziness, headache, chest pain, and SOB. She also reports her finger nails have been falling intermittently with thin and outward bowing. Denies hair changes or skin changes. She reports PICA since she was a child. Most recently, she has been eating the burnt tips of ignited matches from a book of matches every other day. She also eats dread boards and the arelis after she smokes marijuana. Denies other foreign material ingested at this time. She continues to eat a varied normal diet per report.     Meds: Fluconazole, buspirone, clonazepam, omeprazole    ONSET / TIME of exposure(s): chronic    QUANTITY of exposure(s): unclear    ROUTE of exposure:  INGESTION      CONTEXT of exposure: at home    ASSOCIATED symptoms: +dizziness, headache, chest pain, SOB, nail changes.     PAST MEDICAL & SURGICAL HISTORY:  Anxiety      Lower back pain      HLD (hyperlipidemia)      HTN (hypertension)  borderline -no meds      Hypothyroidism      OA (osteoarthritis)      H/O gastric ulcer      Obesity  bmi  47.6      Depression      GRECIA on CPAP      H/O thalassemia minor      Candida esophagitis      History of thrush      H/O tubal ligation      S/P tonsillectomy      History of D&C  HYSTEROSCOPY-AUG 2019      H/O umbilical hernia repair          MEDICATION HISTORY: see hpi      FAMILY HISTORY:  Family history of CVA  GRANDMOTHER        REVIEW OF SYSTEMS: All systems negative except per HPI.      Vital Signs Last 24 Hrs  T(C): 36.6 (15 Jul 2023 06:58), Max: 36.6 (15 Jul 2023 06:58)  T(F): 97.8 (15 Jul 2023 06:58), Max: 97.8 (15 Jul 2023 06:58)  HR: 74 (15 Jul 2023 06:58) (74 - 74)  BP: 123/60 (15 Jul 2023 06:58) (123/60 - 123/60)  BP(mean): --  RR: 18 (15 Jul 2023 06:58) (18 - 18)  SpO2: 99% (15 Jul 2023 06:58) (99% - 99%)    Parameters below as of 15 Jul 2023 06:58  Patient On (Oxygen Delivery Method): room air        SIGNIFICANT LABORATORY STUDIES:                        8.2    3.87  )-----------( 291      ( 15 Jul 2023 08:30 )             29.6       07-15    140  |  105  |  13  ----------------------------<  90  4.2   |  27  |  <0.5<L>    Ca    8.9      15 Jul 2023 08:30  Mg     2.0     07-15    TPro  5.6<L>  /  Alb  4.1  /  TBili  0.2  /  DBili  x   /  AST  15  /  ALT  13  /  AlkPhos  43  07-15      PT/INR - ( 15 Jul 2023 08:30 )   PT: 12.00 sec;   INR: 1.05 ratio         PTT - ( 15 Jul 2023 08:30 )  PTT:30.7 sec    Urinalysis Basic - ( 15 Jul 2023 08:30 )    Color: x / Appearance: x / SG: x / pH: x  Gluc: 90 mg/dL / Ketone: x  / Bili: x / Urobili: x   Blood: x / Protein: x / Nitrite: x   Leuk Esterase: x / RBC: x / WBC x   Sq Epi: x / Non Sq Epi: x / Bacteria: x        Anion Gap: 8 07-15 @ 08:30  CK: -- 07-15 @ 08:30  Troponin:  --  07-15 @ 08:30  Pro-BNP:  --  07-15 @ 08:30  VBG:  --  07-15 @ 08:30  Carboxyhemoglobin %:  --  07-15 @ 08:30  Methemoglobin %:  --  07-15 @ 08:30  Osmolality Serum:  --  07-15 @ 08:30  Aspirin Level: --  07-15 @ 08:30  Acetaminophen Level:  --  07-15 @ 08:30  Ethanol Level:  --  07-15 @ 08:30  Digoxin Level:  --  07-15 @ 08:30  Phenytoin Level:  --  07-15 @ 08:30  Carbamazepine level:  --  07-15 @ 08:30  Lamotrigine level:  --  07-15 @ 08:30

## 2023-07-15 NOTE — ED ADULT NURSE NOTE - NSFALLUNIVINTERV_ED_ALL_ED
Bed/Stretcher in lowest position, wheels locked, appropriate side rails in place/Call bell, personal items and telephone in reach/Instruct patient to call for assistance before getting out of bed/chair/stretcher/Non-slip footwear applied when patient is off stretcher/[place] to call system/Physically safe environment - no spills, clutter or unnecessary equipment/Purposeful proactive rounding/Room/bathroom lighting operational, light cord in reach

## 2023-07-15 NOTE — ED PROVIDER NOTE - PATIENT PORTAL LINK FT
You can access the FollowMyHealth Patient Portal offered by [facility] by registering at the following website: [email or web address]. By joining [employer]’s FollowMyHealth portal, you will also be able to view your health information using other applications (apps) compatible with our system.

## 2023-07-15 NOTE — ED PROVIDER NOTE - CLINICAL SUMMARY MEDICAL DECISION MAKING FREE TEXT BOX
48-year-old female that presents with weakness. Labs EKG imaging consult tox due to pica. pt reports improvement. pt cleared by toxicology. Labs and EKG were ordered and reviewed.  Imaging was ordered and reviewed by me.  Appropriate medications for patient's presenting complaints were ordered and effects were reassessed.  Patient's records (prior hospital, ED visit, and/or nursing home notes if available) were reviewed.  Additional history was obtained from EMS, family, and/or PCP (where available).  Escalation to admission/observation was considered.  However patient feels much better and is comfortable with discharge.  Appropriate follow-up was arranged.     I have discussed the discharge plan with the patient. The patient agrees with the plan, as discussed.  The patient understands Emergency Department diagnosis is a preliminary diagnosis often based on limited information and that the patient must adhere to the follow-up plan as discussed.  The patient understands that if the symptoms worsen or if prescribed medications do not have the desired/planned effect that the patient may return to the Emergency Department at any time for further evaluation and treatment.

## 2023-07-15 NOTE — ED PROVIDER NOTE - ATTENDING APP SHARED VISIT CONTRIBUTION OF CARE
48-year-old female with a past medical history significant obesity gastric bypass IUTD anxiety who presents with weakness. Patient states that she has been having CP and muscle cramping similar to her past episode where she required a blood transfusion. Of note patient also endorses eating matches and ashes during this time. Patient denies any other medical complaints.   VITAL SIGNS: I have reviewed nursing notes and confirm.    CONSTITUTIONAL: non-toxic, well appearing  SKIN: no rash, no petechiae.  EYES: EOMI, pink conjunctiva, anicteric  ENT: tongue midline, no exudates, MMM  NECK: Supple; no meningismus, no JVD  CARD: RRR, no murmurs, equal radial pulses bilaterally 2+  RESP: CTAB, no respiratory distress  ABD: Soft, non-tender, non-distended, no peritoneal signs, no HSM, no CVA tenderness  EXT: Normal ROM x4. No edema. No calves tenderness  NEURO: Alert, oriented x3. CN2-12 intact, equal strength bilaterally, nl gait.  PSYCH: Cooperative, appropriate.      48-year-old female that presents with weakness. Labs EKG imaging consult tox due to pica. Reassess dispo pending

## 2023-07-15 NOTE — CONSULT NOTE ADULT - ASSESSMENT
47 yo F, hx obesity, gastric bypass, anemia (iron deficiency anemia of unknown etiology, thalassemia minor?), HLD, HTN, GRECIA, osteoarthritis, peptic ulcer disease, anxiety, candida esophagitis attributed to prolonged steroids for hearing loss, has picc line receiving capsofungin, who presents with multiple complaints. Toxicology is consulted given history of PICA behavior with current complaints. Ingested material include pyrolyze match sticks, marijuana arelis, and dread boards. She has normal vitals. Exam non-focal and fingernail changes were not appreciated. Labs today significant for microcytic hypochromic anemia.     Medical etiologies for the patient's presentation should be strongly considered. It is unclear if patient's clinical presentation is toxicological in origin. Microcytic hypochromic anemia can be seen in chronic lead exposure, although the ingested material should not contain heavy amounts of lead. The patient may have other sources of PICA ingestion that may not be disclosed that contain lead such as dirt or leaded paint chips. Matchstick heads contain adhesives and potassium chlorate, the latter is know to induce methemoglobinemia, however once combusted becomes potassium chloride. Marijuana arelis contains various metal oxides that is not known to cause these symptoms.     #Recommendations  - Obtain EKG, serum lead level, and serum methemoglobin levels. If abnormal, contact toxicology team. If workup unremarkable, cleared from acute toxicological standpoint at this time.    - Recommend medical workup for patient's complaints  - Further medical, psychiatric, and nutritional consultation per primary team    Thank you for involving us in the care of this patient. Assessment and plan discussed with toxicology attending Dr. Jamaal Robles. Please do not hesitate to reach out to the toxicology team for any further questions or concerns.    The On-Call Toxicology Fellow can be reached 24/7 via Pager #[phone]  Please send a 10 digit call back # as [place] cover multiple hospitals    Blake Crespo MD  Toxicology Fellow  PGY-5

## 2023-07-17 ENCOUNTER — EMERGENCY (EMERGENCY)
Facility: HOSPITAL | Age: 48
LOS: 0 days | Discharge: HOME | End: 2023-07-18
Attending: EMERGENCY MEDICINE
Payer: COMMERCIAL

## 2023-07-17 VITALS
DIASTOLIC BLOOD PRESSURE: 55 MMHG | HEIGHT: 68 IN | SYSTOLIC BLOOD PRESSURE: 103 MMHG | WEIGHT: 158.95 LBS | OXYGEN SATURATION: 99 % | TEMPERATURE: 98 F | RESPIRATION RATE: 18 BRPM | HEART RATE: 70 BPM

## 2023-07-17 DIAGNOSIS — Z98.890 OTHER SPECIFIED POSTPROCEDURAL STATES: Chronic | ICD-10-CM

## 2023-07-17 DIAGNOSIS — Z90.89 ACQUIRED ABSENCE OF OTHER ORGANS: Chronic | ICD-10-CM

## 2023-07-17 DIAGNOSIS — T82.838A HEMORRHAGE DUE TO VASCULAR PROSTHETIC DEVICES, IMPLANTS AND GRAFTS, INITIAL ENCOUNTER: ICD-10-CM

## 2023-07-17 DIAGNOSIS — T50.901A POISONING BY UNSPECIFIED DRUGS, MEDICAMENTS AND BIOLOGICAL SUBSTANCES, ACCIDENTAL (UNINTENTIONAL), INITIAL ENCOUNTER: ICD-10-CM

## 2023-07-17 DIAGNOSIS — Z98.51 TUBAL LIGATION STATUS: Chronic | ICD-10-CM

## 2023-07-17 LAB — LEAD BLD-MCNC: <1 UG/DL — SIGNIFICANT CHANGE UP (ref 0–3.4)

## 2023-07-17 PROCEDURE — 99282 EMERGENCY DEPT VISIT SF MDM: CPT

## 2023-07-17 PROCEDURE — 99283 EMERGENCY DEPT VISIT LOW MDM: CPT

## 2023-07-18 PROCEDURE — 99451 NTRPROF PH1/NTRNET/EHR 5/>: CPT

## 2023-07-18 NOTE — ED PROVIDER NOTE - PATIENT PORTAL LINK FT
You can access the FollowMyHealth Patient Portal offered by [facility] by registering at the following website: [email or web address]. By joining [employer]’s FollowMyHealth portal, you will also be able to view your health information using other applications (apps) compatible with our system.

## 2023-07-18 NOTE — ED ADULT NURSE NOTE - NSFALLUNIVINTERV_ED_ALL_ED
Bed/Stretcher in lowest position, wheels locked, appropriate side rails in place/Call bell, personal items and telephone in reach/Instruct patient to call for assistance before getting out of bed/chair/stretcher/Non-slip footwear applied when patient is off stretcher/[place] to call system/Physically safe environment - no spills, clutter or unnecessary equipment/Purposeful proactive rounding/Room/bathroom lighting operational, light cord in reach

## 2023-07-18 NOTE — ED PROVIDER NOTE - CLINICAL SUMMARY MEDICAL DECISION MAKING FREE TEXT BOX
48yF p/w bleeding from PICC line.  Pt well appearing - PICC in place, flushed easily w/o occlusion, no active bleeding and dressing exchanged/site cleansed w/o recurrence.  Recommend supportive care, o/p f/u as previously scheduled, return precautions.

## 2023-07-18 NOTE — ED PROVIDER NOTE - PHYSICAL EXAMINATION
CONSTITUTIONAL: well developed; well nourished; well appearing in no acute distress  HEAD: normocephalic; atraumatic  EYES: no conjunctival injection, no scleral icterus  ENT: no nasal discharge; airway clear.  NECK: supple; non tender. + full passive ROM in all directions  CARD: warm and well perfused, not tachycardic  RESP: breathing comfortably on RA, speaking in full sentences w/o distress  EXT: moving all extremities spontaneously, normal ROM. No clubbing, cyanosis or edema, PICC line in place, slightly pulled out but sutures intact and no active bleeding  SKIN: warm and dry, no lesions noted  NEURO: alert, oriented, CN II-XII grossly intact, motor and sensory grossly intact, speech nonslurred, no focal deficits. GCS 15  PSYCH: calm, cooperative, appropriate, good eye contact, logical thought process, no apparent danger to self or others

## 2023-07-18 NOTE — ED PROVIDER NOTE - NSFOLLOWUPINSTRUCTIONS_ED_ALL_ED_FT
Keep PICC clean, covered and make sure it is flushing well.  Return to the ED or call your doctor if PICC stops working.

## 2023-08-05 LAB
CULTURE RESULTS: SIGNIFICANT CHANGE UP
SPECIMEN SOURCE: SIGNIFICANT CHANGE UP

## 2023-09-07 NOTE — ED ADULT NURSE NOTE - NS ED NOTE ABUSE RESPONSE YN
Yes Bilobed Flap Text: The surgical defect and surrounding skin were prepped with Betadine. The choice of the repair was performed because extensive undermining was required, to close a large gap created by lesion removal, and to reduce tension to enhance both functional and cosmetic results. The defect edges were debeveled with a #15 scalpel blade.  Given the size and location of the defect, a bilobed transposition flap was deemed most appropriate. Using a sterile surgical marker, an appropriate bilobed transposition flap was drawn incorporating the defect and placing the expected incisions within the relaxed skin tension lines where possible. The area thus outlined was incised deep to adipose tissue with a #15 scalpel blade. The skin margins were undermined to an appropriate distance in all directions. Following this, the designed flap was advanced and carried over into the primary defect and sutured into place. The subcutaneous tissue and dermis were closed with 4-0 Monocryl. Epidermal closure was achieved with 5-0 Polypropylene (running).  During the repair hemostasis was achieved with Pressure. Petrolatum + pressure dressing were applied.

## 2023-10-18 ENCOUNTER — APPOINTMENT (OUTPATIENT)
Dept: MRI IMAGING | Facility: HOSPITAL | Age: 48
End: 2023-10-18
Payer: COMMERCIAL

## 2023-10-18 ENCOUNTER — OUTPATIENT (OUTPATIENT)
Dept: OUTPATIENT SERVICES | Facility: HOSPITAL | Age: 48
LOS: 1 days | End: 2023-10-18
Payer: COMMERCIAL

## 2023-10-18 DIAGNOSIS — Z90.89 ACQUIRED ABSENCE OF OTHER ORGANS: Chronic | ICD-10-CM

## 2023-10-18 DIAGNOSIS — Z98.51 TUBAL LIGATION STATUS: Chronic | ICD-10-CM

## 2023-10-18 DIAGNOSIS — Z98.890 OTHER SPECIFIED POSTPROCEDURAL STATES: Chronic | ICD-10-CM

## 2023-10-18 PROCEDURE — 72157 MRI CHEST SPINE W/O & W/DYE: CPT | Mod: 26

## 2023-10-18 PROCEDURE — A9585: CPT

## 2023-10-18 PROCEDURE — 72157 MRI CHEST SPINE W/O & W/DYE: CPT

## 2023-10-27 ENCOUNTER — APPOINTMENT (OUTPATIENT)
Dept: NEUROSURGERY | Facility: CLINIC | Age: 48
End: 2023-10-27
Payer: COMMERCIAL

## 2023-10-27 VITALS
WEIGHT: 162 LBS | DIASTOLIC BLOOD PRESSURE: 69 MMHG | HEIGHT: 67 IN | HEART RATE: 79 BPM | RESPIRATION RATE: 18 BRPM | BODY MASS INDEX: 25.43 KG/M2 | SYSTOLIC BLOOD PRESSURE: 111 MMHG | OXYGEN SATURATION: 98 %

## 2023-10-27 PROCEDURE — 99214 OFFICE O/P EST MOD 30 MIN: CPT

## 2023-12-05 NOTE — ED PROVIDER NOTE - OBJECTIVE STATEMENT
Please bring all medicines, vitamins, and herbal supplements with you when you come to the office.    Prescriptions will not be filled unless you are compliant with your follow up appointments or have a follow up appointment scheduled as per instruction of your physician. Refills should be requested at the time of your visit.     Follow up one year  
48yF p/w PICC line issue - has PICC in place for IV antifungal for steroid-induced esophageal candidiasis - wrapped her arm to take a shower then noticed when she unwrapped it that there was blood under the dressing.  Was too scared to flush it and did not want to introduce infection by cleaning the dressing herself.

## 2024-03-04 NOTE — ED PROVIDER NOTE - BIRTH SEX
----- Message from Lorraine Dennis DO sent at 3/4/2024  9:06 AM EST -----  Patient that her bone density scan shows that her bone density is a little low.  She can treat this with over-the-counter  vitamin D at 2000 units daily and calcium over-the-counter 1200 mg daily.   Female

## 2024-05-31 NOTE — PHARMACOTHERAPY INTERVENTION NOTE - INTERVENTION TYPE RECOOMEND
Attempted again @0138 to give report with no answer. Pt on way to facility.     Attempted to call report to [place] x2 with no answer. Will re attempt.    Route of Administraion Change

## 2024-07-03 DIAGNOSIS — D17.9 BENIGN LIPOMATOUS NEOPLASM, UNSPECIFIED: ICD-10-CM

## 2024-07-16 NOTE — HISTORY OF PRESENT ILLNESS
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 Lesion paraspinal, on Right, near T1-2. No focal deficits.  She does report occasional severe pain when her right shoulder is touched in a certain way. She acknowledges some right sided weakness/clumsiness to her right leg.  TODAY 7/16/24- for routine follow up with an MRI to review.

## 2024-07-16 NOTE — REASON FOR VISIT
[Follow-Up: _____] : a [unfilled] follow-up visit [Home] : at home, [unfilled] , at the time of the visit. [Medical Office: ([facility])___] : at the medical office located in  [Patient] : the patient [Self] : self

## 2024-07-16 NOTE — ASSESSMENT
[FreeTextEntry1] : The patient's established problem of chest/thoracic mass juxtaposed to the spinal column and brachial plexus is . The differential diagnosis includes lipoma, nerve sheath tumor, schwannoma, or liposarcoma. This has demonstrated stability on serial imaging.  She understands to notify us with any new symptoms including weakness, numbness/tingling, or gait/balance changes. I have explained all neurosurgical risks and benefits to the planned approach and the patient wishes to proceed at this time.

## 2024-07-22 ENCOUNTER — APPOINTMENT (OUTPATIENT)
Dept: NEUROSURGERY | Facility: CLINIC | Age: 49
End: 2024-07-22

## 2025-01-28 NOTE — REVIEW OF SYSTEMS
Subjective:       Ann G Schoen is a 62 y.o. female and is here for a comprehensive physical exam.  The patient reports no problems        Patient reports that she exercises 5 days a week.  3 days of jogging and 2 days of Pilates    Alcohol-nightly 2 glasses of wine  No tobacco use   History:  LMP: No LMP recorded. (Menstrual status: Other - See Notes).  Menopause at14 years  Last pap date:   Abnormal pap? no  : 1  Para: 1  Patient's medications, allergies, past medical, surgical, social and family histories were reviewed and updated as appropriate.          Do you take any herbs or supplements that were not prescribed by a doctor? yes vit D, fiber  Are you taking calcium supplements? no  Are you taking aspirin daily? no    Review of Systems  Do you have pain that bothers you in your daily life? no  Pertinent items are noted in HPI.     Objective:      /80   Pulse 80   Temp 98 °F (36.7 °C)   Ht 1.702 m (5' 7\")   Wt 66.7 kg (147 lb)   SpO2 97%   BMI 23.02 kg/m²   General appearance: alert, appears stated age, and cooperative  Neck: no adenopathy, supple, symmetrical, trachea midline, and thyroid not enlarged, symmetric, no tenderness/mass/nodules  Lungs: clear to auscultation bilaterally  Heart: Normal rate and rhythm  Abdomen: soft, non-tender; bowel sounds normal; no masses,  no organomegaly  Extremities: extremities normal, atraumatic, no cyanosis or edema  Lymph nodes: Cervical, supraclavicular, and axillary nodes normal.  Neurologic: Grossly normal          UA-normal  EKG-sinus bradycardia otherwise normal  Assessment:      Healthy female exam.        Plan:      1.  CPE labs    Cervical cancer screening/Pap as patient is due  2. Patient Counseling:  --Nutrition: Stressed importance of moderation in sodium/caffeine intake, saturated fat and cholesterol, caloric balance, sufficient intake of fresh fruits, vegetables, fiber, calcium, iron, and 1 mg of folate supplement per day (for females 
[Negative] : Allergic/Immunologic

## 2025-06-23 NOTE — BH SAFETY PLAN - ASK FOR HELP PHONE 1
Stable, continue same. Continue to monitor closely as she loses weight. Will take off anti-hypertensives as we need to.      [phone]